# Patient Record
Sex: FEMALE | Race: ASIAN | NOT HISPANIC OR LATINO | Employment: OTHER | URBAN - METROPOLITAN AREA
[De-identification: names, ages, dates, MRNs, and addresses within clinical notes are randomized per-mention and may not be internally consistent; named-entity substitution may affect disease eponyms.]

---

## 2017-07-11 ENCOUNTER — COMMUNICATION - HEALTHEAST (OUTPATIENT)
Dept: FAMILY MEDICINE | Facility: CLINIC | Age: 32
End: 2017-07-11

## 2017-07-28 ENCOUNTER — PRENATAL OFFICE VISIT - HEALTHEAST (OUTPATIENT)
Dept: FAMILY MEDICINE | Facility: CLINIC | Age: 32
End: 2017-07-28

## 2017-07-28 ENCOUNTER — HOSPITAL ENCOUNTER (OUTPATIENT)
Dept: LAB | Age: 32
Setting detail: SPECIMEN
Discharge: HOME | End: 2017-07-28

## 2017-07-28 DIAGNOSIS — N92.6 ABNORMAL MENSES: ICD-10-CM

## 2017-07-28 DIAGNOSIS — Z34.90 NORMAL PREGNANCY: ICD-10-CM

## 2017-07-28 DIAGNOSIS — Z32.01 POSITIVE PREGNANCY TEST: ICD-10-CM

## 2017-07-28 LAB
HBV SURFACE AG SERPL QL IA: NEGATIVE
HIV 1+2 AB+HIV1 P24 AG SERPL QL IA: NEGATIVE

## 2017-07-28 ASSESSMENT — MIFFLIN-ST. JEOR: SCORE: 1093.71

## 2017-07-29 LAB — SYPHILIS RPR SCREEN - HISTORICAL: NORMAL

## 2017-08-03 ENCOUNTER — COMMUNICATION - HEALTHEAST (OUTPATIENT)
Dept: FAMILY MEDICINE | Facility: CLINIC | Age: 32
End: 2017-08-03

## 2017-08-03 ENCOUNTER — HOSPITAL ENCOUNTER (OUTPATIENT)
Dept: ULTRASOUND IMAGING | Facility: HOSPITAL | Age: 32
Discharge: HOME | End: 2017-08-03
Attending: PHYSICIAN ASSISTANT

## 2017-08-03 DIAGNOSIS — Z34.90 NORMAL PREGNANCY: ICD-10-CM

## 2017-08-03 DIAGNOSIS — Z32.01 POSITIVE PREGNANCY TEST: ICD-10-CM

## 2017-09-11 ENCOUNTER — HOSPITAL ENCOUNTER (OUTPATIENT)
Dept: LAB | Age: 32
Setting detail: SPECIMEN
Discharge: HOME | End: 2017-09-11

## 2017-09-11 ENCOUNTER — PRENATAL OFFICE VISIT - HEALTHEAST (OUTPATIENT)
Dept: FAMILY MEDICINE | Facility: CLINIC | Age: 32
End: 2017-09-11

## 2017-09-11 DIAGNOSIS — Z34.93 ENCOUNTER FOR SUPERVISION OF NORMAL PREGNANCY IN THIRD TRIMESTER: ICD-10-CM

## 2017-09-12 ENCOUNTER — COMMUNICATION - HEALTHEAST (OUTPATIENT)
Dept: FAMILY MEDICINE | Facility: CLINIC | Age: 32
End: 2017-09-12

## 2017-09-12 LAB
HBV SURFACE AB SERPL IA-ACNC: POSITIVE M[IU]/ML
SYPHILIS RPR SCREEN - HISTORICAL: NORMAL

## 2017-09-15 ENCOUNTER — AMBULATORY - HEALTHEAST (OUTPATIENT)
Dept: NURSING | Facility: CLINIC | Age: 32
End: 2017-09-15

## 2017-10-10 ENCOUNTER — PRENATAL OFFICE VISIT - HEALTHEAST (OUTPATIENT)
Dept: FAMILY MEDICINE | Facility: CLINIC | Age: 32
End: 2017-10-10

## 2017-10-10 DIAGNOSIS — Z23 NEED FOR IMMUNIZATION AGAINST INFLUENZA: ICD-10-CM

## 2017-10-10 DIAGNOSIS — Z34.93 ENCOUNTER FOR SUPERVISION OF NORMAL PREGNANCY IN THIRD TRIMESTER: ICD-10-CM

## 2017-10-19 ENCOUNTER — PRENATAL OFFICE VISIT - HEALTHEAST (OUTPATIENT)
Dept: FAMILY MEDICINE | Facility: CLINIC | Age: 32
End: 2017-10-19

## 2017-10-19 DIAGNOSIS — Z34.93 ENCOUNTER FOR SUPERVISION OF NORMAL PREGNANCY IN THIRD TRIMESTER: ICD-10-CM

## 2017-11-07 ENCOUNTER — AMBULATORY - HEALTHEAST (OUTPATIENT)
Dept: NURSING | Facility: CLINIC | Age: 32
End: 2017-11-07

## 2017-11-07 ENCOUNTER — PRENATAL OFFICE VISIT - HEALTHEAST (OUTPATIENT)
Dept: FAMILY MEDICINE | Facility: CLINIC | Age: 32
End: 2017-11-07

## 2017-11-07 DIAGNOSIS — Z34.93 ENCOUNTER FOR SUPERVISION OF NORMAL PREGNANCY IN THIRD TRIMESTER: ICD-10-CM

## 2017-11-14 ENCOUNTER — AMBULATORY - HEALTHEAST (OUTPATIENT)
Dept: NURSING | Facility: CLINIC | Age: 32
End: 2017-11-14

## 2017-11-21 ENCOUNTER — PRENATAL OFFICE VISIT - HEALTHEAST (OUTPATIENT)
Dept: FAMILY MEDICINE | Facility: CLINIC | Age: 32
End: 2017-11-21

## 2017-11-21 DIAGNOSIS — Z34.93 ENCOUNTER FOR SUPERVISION OF NORMAL PREGNANCY IN THIRD TRIMESTER: ICD-10-CM

## 2017-11-28 ENCOUNTER — PRENATAL OFFICE VISIT - HEALTHEAST (OUTPATIENT)
Dept: FAMILY MEDICINE | Facility: CLINIC | Age: 32
End: 2017-11-28

## 2017-11-28 DIAGNOSIS — Z34.93 ENCOUNTER FOR SUPERVISION OF NORMAL PREGNANCY IN THIRD TRIMESTER: ICD-10-CM

## 2017-12-05 ENCOUNTER — PRENATAL OFFICE VISIT - HEALTHEAST (OUTPATIENT)
Dept: FAMILY MEDICINE | Facility: CLINIC | Age: 32
End: 2017-12-05

## 2017-12-05 DIAGNOSIS — Z34.93 ENCOUNTER FOR SUPERVISION OF NORMAL PREGNANCY IN THIRD TRIMESTER: ICD-10-CM

## 2017-12-11 ENCOUNTER — COMMUNICATION - HEALTHEAST (OUTPATIENT)
Dept: FAMILY MEDICINE | Facility: CLINIC | Age: 32
End: 2017-12-11

## 2018-10-12 ENCOUNTER — PRENATAL OFFICE VISIT - HEALTHEAST (OUTPATIENT)
Dept: FAMILY MEDICINE | Facility: CLINIC | Age: 33
End: 2018-10-12

## 2018-10-12 ENCOUNTER — HOSPITAL ENCOUNTER (OUTPATIENT)
Dept: LAB | Age: 33
Setting detail: SPECIMEN
Discharge: HOME | End: 2018-10-12

## 2018-10-12 DIAGNOSIS — Z34.90 NORMAL PREGNANCY: ICD-10-CM

## 2018-10-12 DIAGNOSIS — N92.6 MISSED PERIOD: ICD-10-CM

## 2018-10-12 DIAGNOSIS — Z32.01 PREGNANCY TEST POSITIVE: ICD-10-CM

## 2018-10-12 LAB
ALBUMIN UR-MCNC: NEGATIVE MG/DL
AMPHETAMINES UR QL SCN: NORMAL
BARBITURATES UR QL: NORMAL
BASOPHILS # BLD AUTO: 0.1 THOU/UL (ref 0–0.2)
BASOPHILS NFR BLD AUTO: 1 % (ref 0–2)
BENZODIAZ UR QL: NORMAL
CANNABINOIDS UR QL SCN: NORMAL
COCAINE UR QL: NORMAL
CREAT UR-MCNC: 23.1 MG/DL
EOSINOPHIL # BLD AUTO: 0.3 THOU/UL (ref 0–0.4)
EOSINOPHIL NFR BLD AUTO: 4 % (ref 0–6)
ERYTHROCYTE [DISTWIDTH] IN BLOOD BY AUTOMATED COUNT: 13.4 % (ref 11–14.5)
GLUCOSE UR STRIP-MCNC: NEGATIVE MG/DL
HCG UR QL: POSITIVE
HCT VFR BLD AUTO: 42.8 % (ref 35–47)
HGB BLD-MCNC: 13.4 G/DL (ref 12–16)
HIV 1+2 AB+HIV1 P24 AG SERPL QL IA: NEGATIVE
KETONES UR STRIP-MCNC: NEGATIVE MG/DL
LYMPHOCYTES # BLD AUTO: 1.8 THOU/UL (ref 0.8–4.4)
LYMPHOCYTES NFR BLD AUTO: 25 % (ref 20–40)
MCH RBC QN AUTO: 26.2 PG (ref 27–34)
MCHC RBC AUTO-ENTMCNC: 31.3 G/DL (ref 32–36)
MCV RBC AUTO: 84 FL (ref 80–100)
MONOCYTES # BLD AUTO: 0.5 THOU/UL (ref 0–0.9)
MONOCYTES NFR BLD AUTO: 7 % (ref 2–10)
NEUTROPHILS # BLD AUTO: 4.5 THOU/UL (ref 2–7.7)
NEUTROPHILS NFR BLD AUTO: 62 % (ref 50–70)
OPIATES UR QL SCN: NORMAL
OXYCODONE UR QL: NORMAL
PCP UR QL SCN: NORMAL
PLATELET # BLD AUTO: 262 THOU/UL (ref 140–440)
PMV BLD AUTO: 10.2 FL (ref 8.5–12.5)
RBC # BLD AUTO: 5.11 MILL/UL (ref 3.8–5.4)
WBC: 7.3 THOU/UL (ref 4–11)

## 2018-10-13 LAB
ANTIBODY SCREEN: NEGATIVE
BACTERIA SPEC CULT: NO GROWTH
COLLECTION METHOD: NORMAL
HBV SURFACE AG SERPL QL IA: NEGATIVE
LEAD BLD-MCNC: NORMAL UG/DL
LEAD RETEST: NO
T PALLIDUM AB SER QL: NEGATIVE

## 2018-10-15 LAB
ABO/RH(D): NORMAL
ABORH REPEAT: NORMAL
LEAD BLDV-MCNC: <2 UG/DL (ref 0–4.9)
RUBV IGG SERPL QL IA: POSITIVE

## 2018-10-19 ENCOUNTER — COMMUNICATION - HEALTHEAST (OUTPATIENT)
Dept: NURSING | Facility: CLINIC | Age: 33
End: 2018-10-19

## 2018-10-19 ENCOUNTER — HOSPITAL ENCOUNTER (OUTPATIENT)
Dept: ULTRASOUND IMAGING | Facility: HOSPITAL | Age: 33
Discharge: HOME | End: 2018-10-19
Attending: PHYSICIAN ASSISTANT

## 2018-10-19 DIAGNOSIS — Z34.90 NORMAL PREGNANCY: ICD-10-CM

## 2018-10-19 DIAGNOSIS — Z32.01 PREGNANCY TEST POSITIVE: ICD-10-CM

## 2018-11-02 ENCOUNTER — COMMUNICATION - HEALTHEAST (OUTPATIENT)
Dept: NURSING | Facility: CLINIC | Age: 33
End: 2018-11-02

## 2018-11-09 ENCOUNTER — AMBULATORY - HEALTHEAST (OUTPATIENT)
Dept: NURSING | Facility: CLINIC | Age: 33
End: 2018-11-09

## 2018-11-16 ENCOUNTER — COMMUNICATION - HEALTHEAST (OUTPATIENT)
Dept: NURSING | Facility: CLINIC | Age: 33
End: 2018-11-16

## 2018-11-30 ENCOUNTER — COMMUNICATION - HEALTHEAST (OUTPATIENT)
Dept: NURSING | Facility: CLINIC | Age: 33
End: 2018-11-30

## 2018-12-03 ENCOUNTER — COMMUNICATION - HEALTHEAST (OUTPATIENT)
Dept: NURSING | Facility: CLINIC | Age: 33
End: 2018-12-03

## 2018-12-07 ENCOUNTER — PRENATAL OFFICE VISIT - HEALTHEAST (OUTPATIENT)
Dept: FAMILY MEDICINE | Facility: CLINIC | Age: 33
End: 2018-12-07

## 2018-12-07 DIAGNOSIS — Z34.82 ENCOUNTER FOR SUPERVISION OF OTHER NORMAL PREGNANCY IN SECOND TRIMESTER: ICD-10-CM

## 2018-12-07 LAB
CLUE CELLS: NORMAL
HCG UR QL: POSITIVE
TRICHOMONAS, WET PREP: NORMAL
YEAST, WET PREP: NORMAL

## 2018-12-07 ASSESSMENT — MIFFLIN-ST. JEOR: SCORE: 1150.41

## 2018-12-10 LAB
HPV SOURCE: ABNORMAL
HUMAN PAPILLOMA VIRUS 16 DNA: NEGATIVE
HUMAN PAPILLOMA VIRUS 18 DNA: NEGATIVE
HUMAN PAPILLOMA VIRUS FINAL DIAGNOSIS: ABNORMAL
HUMAN PAPILLOMA VIRUS OTHER HR: POSITIVE
SPECIMEN DESCRIPTION: ABNORMAL

## 2018-12-11 LAB
AFP MOM: 0.97 MOM
ALPHA FETO PROTEIN: 34.9 NG/ML
C TRACH DNA SPEC QL PROBE+SIG AMP: NEGATIVE
CALCULATED AGE AT EDD: NORMAL
COLLECTION DATE: NORMAL
CURRENT CIGARETTE SMOKING STATUS: NORMAL
DOWN SYNDROME MATERNAL AGE RISK: NORMAL
DOWN SYNDROME SCREEN RISK ESTIMATE: NORMAL
EDD BY U/S SCAN: NORMAL
GA ON COLLECTION BY U/S SCAN: NORMAL WK,D
GA USED IN RISK ESTIMATE: NORMAL
GENERAL TEST INFORMATION: NORMAL
HCG, TOTAL MOM: 0.95 MOM
HCG, TOTAL: 36.3 IU/ML
INHIBIN MOM: 0.78 MOM
INHIBIN: 143 PG/ML
INITIAL OR REPEAT TESTING: NORMAL
INSULIN DIABETIC: NO
INTERPRETATION: NORMAL
IVF PREGNANCY: NO
Lab: NORMAL
N GONORRHOEA DNA SPEC QL NAA+PROBE: NEGATIVE
NEURAL TUBE DEFECT RISK ESTIMATE: NORMAL
NUMBER OF CHORIONS: NORMAL
NUMBER OF FETUSES:: 1
PATIENT OR FATHER OF BABY HAS A NTD: NO
PATIENT WEIGHT: 122 LBS
PHYSICIAN PHONE NUMBER: NORMAL
PREV DOWN (T21) / TRISOMY PREGNANCY: NORMAL
PREV PREGNANCY W/ NEURAL TUBE DEFECT: NO
PT DATE OF BIRTH: NORMAL
RACE OF MOTHER: NORMAL
RECOMMENDED FOLLOW UP: NORMAL
TRISOMY 18 SCREEN RISK ESTIMATE: NORMAL
UE3 MOM: 1.75 MOM
UE3: 1.39 NG/ML

## 2018-12-14 ENCOUNTER — COMMUNICATION - HEALTHEAST (OUTPATIENT)
Dept: NURSING | Facility: CLINIC | Age: 33
End: 2018-12-14

## 2018-12-14 ENCOUNTER — AMBULATORY - HEALTHEAST (OUTPATIENT)
Dept: NURSING | Facility: CLINIC | Age: 33
End: 2018-12-14

## 2018-12-14 DIAGNOSIS — Z71.89 COUNSELING AND COORDINATION OF CARE: ICD-10-CM

## 2018-12-26 ENCOUNTER — COMMUNICATION - HEALTHEAST (OUTPATIENT)
Dept: FAMILY MEDICINE | Facility: CLINIC | Age: 33
End: 2018-12-26

## 2019-01-02 ENCOUNTER — COMMUNICATION - HEALTHEAST (OUTPATIENT)
Dept: CARE COORDINATION | Facility: CLINIC | Age: 34
End: 2019-01-02

## 2019-01-02 ENCOUNTER — COMMUNICATION - HEALTHEAST (OUTPATIENT)
Dept: NURSING | Facility: CLINIC | Age: 34
End: 2019-01-02

## 2019-01-08 ENCOUNTER — COMMUNICATION - HEALTHEAST (OUTPATIENT)
Dept: NURSING | Facility: CLINIC | Age: 34
End: 2019-01-08

## 2019-01-11 ENCOUNTER — COMMUNICATION - HEALTHEAST (OUTPATIENT)
Dept: TELEHEALTH | Facility: CLINIC | Age: 34
End: 2019-01-11

## 2019-01-11 ENCOUNTER — PRENATAL OFFICE VISIT - HEALTHEAST (OUTPATIENT)
Dept: FAMILY MEDICINE | Facility: CLINIC | Age: 34
End: 2019-01-11

## 2019-01-11 DIAGNOSIS — Z34.82 ENCOUNTER FOR SUPERVISION OF OTHER NORMAL PREGNANCY IN SECOND TRIMESTER: ICD-10-CM

## 2019-01-11 DIAGNOSIS — L70.0 ACNE VULGARIS: ICD-10-CM

## 2019-01-11 LAB
ALBUMIN UR-MCNC: NEGATIVE MG/DL
GLUCOSE UR STRIP-MCNC: NEGATIVE MG/DL
KETONES UR STRIP-MCNC: NEGATIVE MG/DL

## 2019-01-16 ENCOUNTER — COMMUNICATION - HEALTHEAST (OUTPATIENT)
Dept: NURSING | Facility: CLINIC | Age: 34
End: 2019-01-16

## 2019-01-16 ENCOUNTER — COMMUNICATION - HEALTHEAST (OUTPATIENT)
Dept: FAMILY MEDICINE | Facility: CLINIC | Age: 34
End: 2019-01-16

## 2019-01-18 ENCOUNTER — HOSPITAL ENCOUNTER (OUTPATIENT)
Dept: ULTRASOUND IMAGING | Facility: HOSPITAL | Age: 34
Discharge: HOME | End: 2019-01-18
Attending: FAMILY MEDICINE

## 2019-01-18 DIAGNOSIS — Z34.82 ENCOUNTER FOR SUPERVISION OF OTHER NORMAL PREGNANCY IN SECOND TRIMESTER: ICD-10-CM

## 2019-02-05 ENCOUNTER — COMMUNICATION - HEALTHEAST (OUTPATIENT)
Dept: NURSING | Facility: CLINIC | Age: 34
End: 2019-02-05

## 2019-02-22 ENCOUNTER — PRENATAL OFFICE VISIT - HEALTHEAST (OUTPATIENT)
Dept: FAMILY MEDICINE | Facility: CLINIC | Age: 34
End: 2019-02-22

## 2019-02-22 DIAGNOSIS — Z34.02 SUPERVISION OF NORMAL FIRST PREGNANCY IN SECOND TRIMESTER: ICD-10-CM

## 2019-02-22 LAB
ALBUMIN UR-MCNC: NEGATIVE MG/DL
GLUCOSE UR STRIP-MCNC: ABNORMAL MG/DL
KETONES UR STRIP-MCNC: NEGATIVE MG/DL

## 2019-02-26 ENCOUNTER — COMMUNICATION - HEALTHEAST (OUTPATIENT)
Dept: NURSING | Facility: CLINIC | Age: 34
End: 2019-02-26

## 2019-03-08 ENCOUNTER — COMMUNICATION - HEALTHEAST (OUTPATIENT)
Dept: NURSING | Facility: CLINIC | Age: 34
End: 2019-03-08

## 2019-03-12 ENCOUNTER — PRENATAL OFFICE VISIT - HEALTHEAST (OUTPATIENT)
Dept: FAMILY MEDICINE | Facility: CLINIC | Age: 34
End: 2019-03-12

## 2019-03-12 ENCOUNTER — COMMUNICATION - HEALTHEAST (OUTPATIENT)
Dept: FAMILY MEDICINE | Facility: CLINIC | Age: 34
End: 2019-03-12

## 2019-03-12 DIAGNOSIS — R73.9 HYPERGLYCEMIA: ICD-10-CM

## 2019-03-12 DIAGNOSIS — Z34.82 ENCOUNTER FOR SUPERVISION OF OTHER NORMAL PREGNANCY IN SECOND TRIMESTER: ICD-10-CM

## 2019-03-12 DIAGNOSIS — Z34.93 SUPERVISION OF NORMAL PREGNANCY IN THIRD TRIMESTER: ICD-10-CM

## 2019-03-12 LAB
FASTING STATUS PATIENT QL REPORTED: NO
GLUCOSE 1H P 50 G GLC PO SERPL-MCNC: 145 MG/DL (ref 70–139)
HGB BLD-MCNC: 12.6 G/DL (ref 12–16)

## 2019-03-13 LAB — T PALLIDUM AB SER QL: NEGATIVE

## 2019-03-19 ENCOUNTER — AMBULATORY - HEALTHEAST (OUTPATIENT)
Dept: LAB | Facility: CLINIC | Age: 34
End: 2019-03-19

## 2019-03-19 ENCOUNTER — COMMUNICATION - HEALTHEAST (OUTPATIENT)
Dept: TELEHEALTH | Facility: CLINIC | Age: 34
End: 2019-03-19

## 2019-03-19 ENCOUNTER — COMMUNICATION - HEALTHEAST (OUTPATIENT)
Dept: FAMILY MEDICINE | Facility: CLINIC | Age: 34
End: 2019-03-19

## 2019-03-19 DIAGNOSIS — R73.9 HYPERGLYCEMIA: ICD-10-CM

## 2019-03-19 LAB
FASTING STATUS PATIENT QL REPORTED: YES
GLUCOSE 1H P 100 G GLC PO SERPL-MCNC: 195 MG/DL
GLUCOSE 2H P 100 G GLC PO SERPL-MCNC: 154 MG/DL
GLUCOSE 3H P 100 G GLC PO SERPL-MCNC: 138 MG/DL
GLUCOSE P FAST SERPL-MCNC: 81 MG/DL

## 2019-04-11 ENCOUNTER — PRENATAL OFFICE VISIT - HEALTHEAST (OUTPATIENT)
Dept: FAMILY MEDICINE | Facility: CLINIC | Age: 34
End: 2019-04-11

## 2019-04-11 DIAGNOSIS — Z34.93 SUPERVISION OF NORMAL PREGNANCY IN THIRD TRIMESTER: ICD-10-CM

## 2019-04-11 DIAGNOSIS — M54.9 BACK PAIN AFFECTING PREGNANCY IN THIRD TRIMESTER: ICD-10-CM

## 2019-04-11 DIAGNOSIS — O99.891 BACK PAIN AFFECTING PREGNANCY IN THIRD TRIMESTER: ICD-10-CM

## 2019-04-11 LAB
ALBUMIN UR-MCNC: NEGATIVE MG/DL
GLUCOSE UR STRIP-MCNC: NEGATIVE MG/DL
KETONES UR STRIP-MCNC: ABNORMAL MG/DL

## 2019-04-16 ENCOUNTER — HOSPITAL ENCOUNTER (OUTPATIENT)
Dept: ULTRASOUND IMAGING | Facility: HOSPITAL | Age: 34
Discharge: HOME | End: 2019-04-16
Attending: FAMILY MEDICINE

## 2019-05-02 ENCOUNTER — AMBULATORY - HEALTHEAST (OUTPATIENT)
Dept: CARE COORDINATION | Facility: CLINIC | Age: 34
End: 2019-05-02

## 2019-05-03 ENCOUNTER — PRENATAL OFFICE VISIT - HEALTHEAST (OUTPATIENT)
Dept: FAMILY MEDICINE | Facility: CLINIC | Age: 34
End: 2019-05-03

## 2019-05-03 DIAGNOSIS — Z34.93 SUPERVISION OF NORMAL PREGNANCY IN THIRD TRIMESTER: ICD-10-CM

## 2019-05-04 LAB
ALLERGIC TO PENICILLIN: NO
GP B STREP DNA SPEC QL NAA+PROBE: NEGATIVE

## 2019-05-09 ENCOUNTER — COMMUNICATION - HEALTHEAST (OUTPATIENT)
Dept: NURSING | Facility: CLINIC | Age: 34
End: 2019-05-09

## 2019-05-13 ENCOUNTER — PRENATAL OFFICE VISIT - HEALTHEAST (OUTPATIENT)
Dept: FAMILY MEDICINE | Facility: CLINIC | Age: 34
End: 2019-05-13

## 2019-05-13 DIAGNOSIS — Z34.93 SUPERVISION OF NORMAL PREGNANCY IN THIRD TRIMESTER: ICD-10-CM

## 2019-05-14 ENCOUNTER — AMBULATORY - HEALTHEAST (OUTPATIENT)
Dept: CARE COORDINATION | Facility: CLINIC | Age: 34
End: 2019-05-14

## 2019-05-23 ENCOUNTER — PRENATAL OFFICE VISIT - HEALTHEAST (OUTPATIENT)
Dept: FAMILY MEDICINE | Facility: CLINIC | Age: 34
End: 2019-05-23

## 2019-05-23 ENCOUNTER — COMMUNICATION - HEALTHEAST (OUTPATIENT)
Dept: NURSING | Facility: CLINIC | Age: 34
End: 2019-05-23

## 2019-05-23 DIAGNOSIS — Z34.93 SUPERVISION OF NORMAL PREGNANCY IN THIRD TRIMESTER: ICD-10-CM

## 2019-05-23 DIAGNOSIS — Z34.83 ENCOUNTER FOR SUPERVISION OF OTHER NORMAL PREGNANCY IN THIRD TRIMESTER: ICD-10-CM

## 2019-05-27 ENCOUNTER — HOME CARE/HOSPICE - HEALTHEAST (OUTPATIENT)
Dept: HOME HEALTH SERVICES | Facility: HOME HEALTH | Age: 34
End: 2019-05-27

## 2019-05-30 ENCOUNTER — COMMUNICATION - HEALTHEAST (OUTPATIENT)
Dept: NURSING | Facility: CLINIC | Age: 34
End: 2019-05-30

## 2019-05-31 ENCOUNTER — HOME CARE/HOSPICE - HEALTHEAST (OUTPATIENT)
Dept: HOME HEALTH SERVICES | Facility: HOME HEALTH | Age: 34
End: 2019-05-31

## 2019-06-12 ENCOUNTER — COMMUNICATION - HEALTHEAST (OUTPATIENT)
Dept: NURSING | Facility: CLINIC | Age: 34
End: 2019-06-12

## 2019-06-13 ENCOUNTER — COMMUNICATION - HEALTHEAST (OUTPATIENT)
Dept: CARE COORDINATION | Facility: CLINIC | Age: 34
End: 2019-06-13

## 2019-07-10 ENCOUNTER — COMMUNICATION - HEALTHEAST (OUTPATIENT)
Dept: NURSING | Facility: CLINIC | Age: 34
End: 2019-07-10

## 2019-07-11 ENCOUNTER — COMMUNICATION - HEALTHEAST (OUTPATIENT)
Dept: SCHEDULING | Facility: CLINIC | Age: 34
End: 2019-07-11

## 2019-07-12 ENCOUNTER — OFFICE VISIT - HEALTHEAST (OUTPATIENT)
Dept: FAMILY MEDICINE | Facility: CLINIC | Age: 34
End: 2019-07-12

## 2019-07-12 DIAGNOSIS — M70.62 GREATER TROCHANTERIC BURSITIS OF BOTH HIPS: ICD-10-CM

## 2019-07-12 DIAGNOSIS — M54.50 ACUTE BILATERAL LOW BACK PAIN WITHOUT SCIATICA: ICD-10-CM

## 2019-07-12 DIAGNOSIS — M70.61 GREATER TROCHANTERIC BURSITIS OF BOTH HIPS: ICD-10-CM

## 2019-07-31 ENCOUNTER — COMMUNICATION - HEALTHEAST (OUTPATIENT)
Dept: NURSING | Facility: CLINIC | Age: 34
End: 2019-07-31

## 2019-08-06 ENCOUNTER — OFFICE VISIT - HEALTHEAST (OUTPATIENT)
Dept: FAMILY MEDICINE | Facility: CLINIC | Age: 34
End: 2019-08-06

## 2019-08-06 ENCOUNTER — HOSPITAL ENCOUNTER (OUTPATIENT)
Dept: LAB | Age: 34
Setting detail: SPECIMEN
Discharge: HOME | End: 2019-08-06

## 2019-08-06 DIAGNOSIS — N39.3 FEMALE STRESS INCONTINENCE: ICD-10-CM

## 2019-08-06 DIAGNOSIS — B97.7 HIGH RISK HPV INFECTION: ICD-10-CM

## 2019-08-06 LAB
CLUE CELLS: NORMAL
TRICHOMONAS, WET PREP: NORMAL
YEAST, WET PREP: NORMAL

## 2019-08-07 ENCOUNTER — COMMUNICATION - HEALTHEAST (OUTPATIENT)
Dept: NURSING | Facility: CLINIC | Age: 34
End: 2019-08-07

## 2019-08-07 LAB
C TRACH DNA SPEC QL PROBE+SIG AMP: NEGATIVE
HPV SOURCE: NORMAL
HUMAN PAPILLOMA VIRUS 16 DNA: NEGATIVE
HUMAN PAPILLOMA VIRUS 18 DNA: NEGATIVE
HUMAN PAPILLOMA VIRUS FINAL DIAGNOSIS: NORMAL
HUMAN PAPILLOMA VIRUS OTHER HR: NEGATIVE
N GONORRHOEA DNA SPEC QL NAA+PROBE: NEGATIVE
SPECIMEN DESCRIPTION: NORMAL

## 2019-08-13 LAB
BKR LAB AP ABNORMAL BLEEDING: NO
BKR LAB AP BIRTH CONTROL/HORMONES: NORMAL
BKR LAB AP CERVICAL APPEARANCE: NORMAL
BKR LAB AP GYN ADEQUACY: NORMAL
BKR LAB AP GYN INTERPRETATION: NORMAL
BKR LAB AP HPV REFLEX: NORMAL
BKR LAB AP LMP: NORMAL
BKR LAB AP PATIENT STATUS: NORMAL
BKR LAB AP PREVIOUS ABNORMAL: NORMAL
BKR LAB AP PREVIOUS NORMAL: 2018
HIGH RISK?: NO
PATH REPORT.COMMENTS IMP SPEC: NORMAL
RESULT FLAG (HE HISTORICAL CONVERSION): NORMAL

## 2019-08-14 ENCOUNTER — COMMUNICATION - HEALTHEAST (OUTPATIENT)
Dept: FAMILY MEDICINE | Facility: CLINIC | Age: 34
End: 2019-08-14

## 2019-08-16 ENCOUNTER — COMMUNICATION - HEALTHEAST (OUTPATIENT)
Dept: NURSING | Facility: CLINIC | Age: 34
End: 2019-08-16

## 2019-08-20 ENCOUNTER — COMMUNICATION - HEALTHEAST (OUTPATIENT)
Dept: CARE COORDINATION | Facility: CLINIC | Age: 34
End: 2019-08-20

## 2019-08-30 ENCOUNTER — COMMUNICATION - HEALTHEAST (OUTPATIENT)
Dept: NURSING | Facility: CLINIC | Age: 34
End: 2019-08-30

## 2019-09-03 ENCOUNTER — COMMUNICATION - HEALTHEAST (OUTPATIENT)
Dept: NURSING | Facility: CLINIC | Age: 34
End: 2019-09-03

## 2019-09-06 ENCOUNTER — COMMUNICATION - HEALTHEAST (OUTPATIENT)
Dept: NURSING | Facility: CLINIC | Age: 34
End: 2019-09-06

## 2019-10-07 ENCOUNTER — COMMUNICATION - HEALTHEAST (OUTPATIENT)
Dept: NURSING | Facility: CLINIC | Age: 34
End: 2019-10-07

## 2019-10-08 ENCOUNTER — COMMUNICATION - HEALTHEAST (OUTPATIENT)
Dept: FAMILY MEDICINE | Facility: CLINIC | Age: 34
End: 2019-10-08

## 2019-10-08 ENCOUNTER — OFFICE VISIT - HEALTHEAST (OUTPATIENT)
Dept: FAMILY MEDICINE | Facility: CLINIC | Age: 34
End: 2019-10-08

## 2019-10-08 DIAGNOSIS — O20.0 THREATENED MISCARRIAGE: ICD-10-CM

## 2019-10-08 DIAGNOSIS — N92.6 ABNORMAL MENSES: ICD-10-CM

## 2019-10-08 LAB
HCG SERPL-ACNC: 55 MLU/ML (ref 0–4)
HCG UR QL: POSITIVE

## 2019-10-09 ENCOUNTER — HOSPITAL ENCOUNTER (OUTPATIENT)
Dept: ULTRASOUND IMAGING | Facility: HOSPITAL | Age: 34
Discharge: HOME | End: 2019-10-09
Attending: PHYSICIAN ASSISTANT

## 2019-10-09 DIAGNOSIS — N92.6 ABNORMAL MENSES: ICD-10-CM

## 2019-10-09 DIAGNOSIS — O20.0 THREATENED MISCARRIAGE: ICD-10-CM

## 2019-10-17 ENCOUNTER — OFFICE VISIT - HEALTHEAST (OUTPATIENT)
Dept: FAMILY MEDICINE | Facility: CLINIC | Age: 34
End: 2019-10-17

## 2019-10-17 DIAGNOSIS — Z30.016 ENCOUNTER FOR INITIAL PRESCRIPTION OF TRANSDERMAL PATCH HORMONAL CONTRACEPTIVE DEVICE: ICD-10-CM

## 2019-10-17 DIAGNOSIS — Z23 NEED FOR INFLUENZA VACCINATION: ICD-10-CM

## 2019-10-17 DIAGNOSIS — O03.9 SAB (SPONTANEOUS ABORTION): ICD-10-CM

## 2019-10-17 LAB — HCG UR QL: NEGATIVE

## 2019-10-17 ASSESSMENT — MIFFLIN-ST. JEOR: SCORE: 1166.85

## 2019-11-05 ENCOUNTER — COMMUNICATION - HEALTHEAST (OUTPATIENT)
Dept: NURSING | Facility: CLINIC | Age: 34
End: 2019-11-05

## 2019-11-14 ENCOUNTER — COMMUNICATION - HEALTHEAST (OUTPATIENT)
Dept: NURSING | Facility: CLINIC | Age: 34
End: 2019-11-14

## 2019-11-15 ENCOUNTER — COMMUNICATION - HEALTHEAST (OUTPATIENT)
Dept: FAMILY MEDICINE | Facility: CLINIC | Age: 34
End: 2019-11-15

## 2019-11-15 ENCOUNTER — AMBULATORY - HEALTHEAST (OUTPATIENT)
Dept: FAMILY MEDICINE | Facility: CLINIC | Age: 34
End: 2019-11-15

## 2019-11-15 ENCOUNTER — COMMUNICATION - HEALTHEAST (OUTPATIENT)
Dept: NURSING | Facility: CLINIC | Age: 34
End: 2019-11-15

## 2019-12-10 ENCOUNTER — COMMUNICATION - HEALTHEAST (OUTPATIENT)
Dept: NURSING | Facility: CLINIC | Age: 34
End: 2019-12-10

## 2019-12-16 ENCOUNTER — COMMUNICATION - HEALTHEAST (OUTPATIENT)
Dept: NURSING | Facility: CLINIC | Age: 34
End: 2019-12-16

## 2019-12-17 ENCOUNTER — COMMUNICATION - HEALTHEAST (OUTPATIENT)
Dept: CARE COORDINATION | Facility: CLINIC | Age: 34
End: 2019-12-17

## 2019-12-18 ENCOUNTER — COMMUNICATION - HEALTHEAST (OUTPATIENT)
Dept: NURSING | Facility: CLINIC | Age: 34
End: 2019-12-18

## 2020-01-02 ENCOUNTER — AMBULATORY - HEALTHEAST (OUTPATIENT)
Dept: FAMILY MEDICINE | Facility: CLINIC | Age: 35
End: 2020-01-02

## 2020-01-02 DIAGNOSIS — Z30.016 ENCOUNTER FOR INITIAL PRESCRIPTION OF TRANSDERMAL PATCH HORMONAL CONTRACEPTIVE DEVICE: ICD-10-CM

## 2020-01-13 ENCOUNTER — COMMUNICATION - HEALTHEAST (OUTPATIENT)
Dept: NURSING | Facility: CLINIC | Age: 35
End: 2020-01-13

## 2020-09-18 ENCOUNTER — COMMUNICATION - HEALTHEAST (OUTPATIENT)
Dept: FAMILY MEDICINE | Facility: CLINIC | Age: 35
End: 2020-09-18

## 2020-09-30 ENCOUNTER — PRENATAL OFFICE VISIT - HEALTHEAST (OUTPATIENT)
Dept: FAMILY MEDICINE | Facility: CLINIC | Age: 35
End: 2020-09-30

## 2020-09-30 DIAGNOSIS — Z32.01 PREGNANCY TEST POSITIVE: ICD-10-CM

## 2020-09-30 DIAGNOSIS — N92.6 ABNORMAL MENSES: ICD-10-CM

## 2020-09-30 DIAGNOSIS — Z3A.01 LESS THAN 8 WEEKS GESTATION OF PREGNANCY: ICD-10-CM

## 2020-09-30 DIAGNOSIS — N89.8 VAGINAL DISCHARGE: ICD-10-CM

## 2020-09-30 DIAGNOSIS — R42 DIZZINESS ON STANDING: ICD-10-CM

## 2020-09-30 LAB
ALBUMIN UR-MCNC: NEGATIVE MG/DL
AMPHETAMINES UR QL SCN: NORMAL
BARBITURATES UR QL: NORMAL
BASOPHILS # BLD AUTO: 0.1 THOU/UL (ref 0–0.2)
BASOPHILS NFR BLD AUTO: 2 % (ref 0–2)
BENZODIAZ UR QL: NORMAL
CANNABINOIDS UR QL SCN: NORMAL
CLUE CELLS: NORMAL
COCAINE UR QL: NORMAL
CREAT UR-MCNC: 22.9 MG/DL
EOSINOPHIL # BLD AUTO: 0.5 THOU/UL (ref 0–0.4)
EOSINOPHIL NFR BLD AUTO: 6 % (ref 0–6)
ERYTHROCYTE [DISTWIDTH] IN BLOOD BY AUTOMATED COUNT: 12.9 % (ref 11–14.5)
GLUCOSE UR STRIP-MCNC: NEGATIVE MG/DL
HCG UR QL: POSITIVE
HCT VFR BLD AUTO: 43.8 % (ref 35–47)
HGB BLD-MCNC: 13.9 G/DL (ref 12–16)
HIV 1+2 AB+HIV1 P24 AG SERPL QL IA: NEGATIVE
IMM GRANULOCYTES # BLD: 0 THOU/UL
IMM GRANULOCYTES NFR BLD: 0 %
KETONES UR STRIP-MCNC: NEGATIVE MG/DL
LYMPHOCYTES # BLD AUTO: 2.3 THOU/UL (ref 0.8–4.4)
LYMPHOCYTES NFR BLD AUTO: 27 % (ref 20–40)
MCH RBC QN AUTO: 26.5 PG (ref 27–34)
MCHC RBC AUTO-ENTMCNC: 31.7 G/DL (ref 32–36)
MCV RBC AUTO: 84 FL (ref 80–100)
MONOCYTES # BLD AUTO: 0.6 THOU/UL (ref 0–0.9)
MONOCYTES NFR BLD AUTO: 7 % (ref 2–10)
NEUTROPHILS # BLD AUTO: 5 THOU/UL (ref 2–7.7)
NEUTROPHILS NFR BLD AUTO: 58 % (ref 50–70)
OPIATES UR QL SCN: NORMAL
OXYCODONE UR QL: NORMAL
PCP UR QL SCN: NORMAL
PLATELET # BLD AUTO: 340 THOU/UL (ref 140–440)
PMV BLD AUTO: 9.9 FL (ref 8.5–12.5)
RBC # BLD AUTO: 5.24 MILL/UL (ref 3.8–5.4)
TRICHOMONAS, WET PREP: NORMAL
WBC: 8.5 THOU/UL (ref 4–11)
YEAST, WET PREP: NORMAL

## 2020-10-01 LAB
ABO/RH(D): NORMAL
ABORH REPEAT: NORMAL
ANTIBODY SCREEN: NEGATIVE
BACTERIA SPEC CULT: NO GROWTH
C TRACH DNA SPEC QL PROBE+SIG AMP: NEGATIVE
HBV SURFACE AG SERPL QL IA: NEGATIVE
N GONORRHOEA DNA SPEC QL NAA+PROBE: NEGATIVE
RUBV IGG SERPL QL IA: POSITIVE
T PALLIDUM AB SER QL: NEGATIVE

## 2020-10-03 LAB
COLLECTION METHOD: NORMAL
LEAD BLD-MCNC: NORMAL UG/DL
LEAD BLDV-MCNC: <2 UG/DL

## 2020-10-21 ENCOUNTER — COMMUNICATION - HEALTHEAST (OUTPATIENT)
Dept: FAMILY MEDICINE | Facility: CLINIC | Age: 35
End: 2020-10-21

## 2020-10-21 ENCOUNTER — HOSPITAL ENCOUNTER (OUTPATIENT)
Dept: ULTRASOUND IMAGING | Facility: HOSPITAL | Age: 35
Discharge: HOME | End: 2020-10-21
Attending: PHYSICIAN ASSISTANT

## 2020-10-21 DIAGNOSIS — Z3A.01 LESS THAN 8 WEEKS GESTATION OF PREGNANCY: ICD-10-CM

## 2020-10-21 DIAGNOSIS — Z32.01 PREGNANCY TEST POSITIVE: ICD-10-CM

## 2020-10-21 DIAGNOSIS — O20.0 THREATENED MISCARRIAGE: ICD-10-CM

## 2020-10-26 ENCOUNTER — SURGERY - HEALTHEAST (OUTPATIENT)
Dept: SURGERY | Facility: HOSPITAL | Age: 35
End: 2020-10-26

## 2020-10-26 ENCOUNTER — ANESTHESIA - HEALTHEAST (OUTPATIENT)
Dept: SURGERY | Facility: HOSPITAL | Age: 35
End: 2020-10-26

## 2020-10-26 ASSESSMENT — MIFFLIN-ST. JEOR: SCORE: 1109.59

## 2020-10-28 ENCOUNTER — COMMUNICATION - HEALTHEAST (OUTPATIENT)
Dept: NURSING | Facility: CLINIC | Age: 35
End: 2020-10-28

## 2021-02-12 ENCOUNTER — OFFICE VISIT - HEALTHEAST (OUTPATIENT)
Dept: FAMILY MEDICINE | Facility: CLINIC | Age: 36
End: 2021-02-12

## 2021-02-12 DIAGNOSIS — N92.6 ABNORMAL MENSES: ICD-10-CM

## 2021-02-12 DIAGNOSIS — Z3A.08 8 WEEKS GESTATION OF PREGNANCY: ICD-10-CM

## 2021-02-12 DIAGNOSIS — E66.09 CLASS 1 OBESITY DUE TO EXCESS CALORIES WITHOUT SERIOUS COMORBIDITY WITH BODY MASS INDEX (BMI) OF 32.0 TO 32.9 IN ADULT: ICD-10-CM

## 2021-02-12 DIAGNOSIS — R71.8 MICROCYTOSIS: ICD-10-CM

## 2021-02-12 DIAGNOSIS — E66.811 CLASS 1 OBESITY DUE TO EXCESS CALORIES WITHOUT SERIOUS COMORBIDITY WITH BODY MASS INDEX (BMI) OF 32.0 TO 32.9 IN ADULT: ICD-10-CM

## 2021-02-12 LAB
ALBUMIN UR-MCNC: NEGATIVE MG/DL
APPEARANCE UR: CLEAR
BASOPHILS # BLD AUTO: 0.2 THOU/UL (ref 0–0.2)
BASOPHILS NFR BLD AUTO: 2 % (ref 0–2)
BILIRUB UR QL STRIP: NEGATIVE
COLOR UR AUTO: YELLOW
EOSINOPHIL # BLD AUTO: 0.7 THOU/UL (ref 0–0.4)
EOSINOPHIL NFR BLD AUTO: 9 % (ref 0–6)
ERYTHROCYTE [DISTWIDTH] IN BLOOD BY AUTOMATED COUNT: 18.8 % (ref 11–14.5)
GLUCOSE UR STRIP-MCNC: NEGATIVE MG/DL
HCG UR QL: POSITIVE
HCT VFR BLD AUTO: 40.7 % (ref 35–47)
HGB BLD-MCNC: 12.6 G/DL (ref 12–16)
HGB UR QL STRIP: ABNORMAL
HIV 1+2 AB+HIV1 P24 AG SERPL QL IA: NEGATIVE
IMM GRANULOCYTES # BLD: 0 THOU/UL
IMM GRANULOCYTES NFR BLD: 0 %
KETONES UR STRIP-MCNC: NEGATIVE MG/DL
LEUKOCYTE ESTERASE UR QL STRIP: ABNORMAL
LYMPHOCYTES # BLD AUTO: 2.1 THOU/UL (ref 0.8–4.4)
LYMPHOCYTES NFR BLD AUTO: 25 % (ref 20–40)
MCH RBC QN AUTO: 23.6 PG (ref 27–34)
MCHC RBC AUTO-ENTMCNC: 31 G/DL (ref 32–36)
MCV RBC AUTO: 76 FL (ref 80–100)
MONOCYTES # BLD AUTO: 0.5 THOU/UL (ref 0–0.9)
MONOCYTES NFR BLD AUTO: 6 % (ref 2–10)
NEUTROPHILS # BLD AUTO: 4.9 THOU/UL (ref 2–7.7)
NEUTROPHILS NFR BLD AUTO: 58 % (ref 50–70)
NITRATE UR QL: NEGATIVE
PH UR STRIP: 5.5 [PH] (ref 5–8)
PLATELET # BLD AUTO: 325 THOU/UL (ref 140–440)
PMV BLD AUTO: 10.1 FL (ref 8.5–12.5)
RBC # BLD AUTO: 5.35 MILL/UL (ref 3.8–5.4)
SP GR UR STRIP: 1.01 (ref 1–1.03)
UROBILINOGEN UR STRIP-ACNC: ABNORMAL
WBC: 8.4 THOU/UL (ref 4–11)

## 2021-02-13 LAB
ABO/RH(D): NORMAL
ABORH REPEAT: NORMAL
ANTIBODY SCREEN: NEGATIVE
BACTERIA SPEC CULT: NO GROWTH
HBV SURFACE AG SERPL QL IA: NEGATIVE
T PALLIDUM AB SER QL: NEGATIVE

## 2021-02-15 ENCOUNTER — COMMUNICATION - HEALTHEAST (OUTPATIENT)
Dept: NURSING | Facility: CLINIC | Age: 36
End: 2021-02-15

## 2021-02-15 LAB
COLLECTION METHOD: NORMAL
HCV AB SERPL QL IA: NEGATIVE
LEAD BLD-MCNC: NORMAL UG/DL
LEAD BLDV-MCNC: <2 UG/DL
RUBV IGG SERPL QL IA: POSITIVE

## 2021-02-16 LAB
C TRACH DNA SPEC QL PROBE+SIG AMP: NEGATIVE
N GONORRHOEA DNA SPEC QL NAA+PROBE: NEGATIVE

## 2021-02-18 LAB
HEMOGLOBIN A2 QUANTITATION: 2.5 % (ref 2.2–3.5)
HEMOGLOBIN ELECTROPHRESIS: NORMAL
HEMOGLOBIN F QUANTITATION: <0.8 % (ref 0–2)
PATH ICD:: NORMAL
REVIEWING PATHOLOGIST: NORMAL

## 2021-02-19 ENCOUNTER — COMMUNICATION - HEALTHEAST (OUTPATIENT)
Dept: CARE COORDINATION | Facility: CLINIC | Age: 36
End: 2021-02-19

## 2021-02-22 ENCOUNTER — HOSPITAL ENCOUNTER (OUTPATIENT)
Dept: ULTRASOUND IMAGING | Facility: HOSPITAL | Age: 36
Discharge: HOME | End: 2021-02-22
Attending: FAMILY MEDICINE

## 2021-02-22 DIAGNOSIS — Z3A.08 8 WEEKS GESTATION OF PREGNANCY: ICD-10-CM

## 2021-02-24 ENCOUNTER — COMMUNICATION - HEALTHEAST (OUTPATIENT)
Dept: NURSING | Facility: CLINIC | Age: 36
End: 2021-02-24

## 2021-02-26 ENCOUNTER — COMMUNICATION - HEALTHEAST (OUTPATIENT)
Dept: NURSING | Facility: CLINIC | Age: 36
End: 2021-02-26

## 2021-02-26 ASSESSMENT — ACTIVITIES OF DAILY LIVING (ADL): DEPENDENT_IADLS:: INDEPENDENT

## 2021-03-01 ENCOUNTER — COMMUNICATION - HEALTHEAST (OUTPATIENT)
Dept: NURSING | Facility: CLINIC | Age: 36
End: 2021-03-01

## 2021-03-25 ENCOUNTER — COMMUNICATION - HEALTHEAST (OUTPATIENT)
Dept: NURSING | Facility: CLINIC | Age: 36
End: 2021-03-25

## 2021-03-26 ENCOUNTER — PRENATAL OFFICE VISIT - HEALTHEAST (OUTPATIENT)
Dept: FAMILY MEDICINE | Facility: CLINIC | Age: 36
End: 2021-03-26

## 2021-03-26 ENCOUNTER — RECORDS - HEALTHEAST (OUTPATIENT)
Dept: ADMINISTRATIVE | Facility: OTHER | Age: 36
End: 2021-03-26

## 2021-03-26 DIAGNOSIS — Z34.82 ENCOUNTER FOR SUPERVISION OF OTHER NORMAL PREGNANCY IN SECOND TRIMESTER: ICD-10-CM

## 2021-03-26 DIAGNOSIS — Z34.91 ENCOUNTER FOR SUPERVISION OF NORMAL PREGNANCY IN FIRST TRIMESTER: ICD-10-CM

## 2021-03-26 DIAGNOSIS — D56.3 ALPHA THALASSEMIA MINOR TRAIT: ICD-10-CM

## 2021-03-26 LAB
ALBUMIN UR-MCNC: NEGATIVE G/DL
FERRITIN SERPL-MCNC: 31 NG/ML (ref 10–130)
GLUCOSE UR STRIP-MCNC: NEGATIVE MG/DL
KETONES UR STRIP-MCNC: NEGATIVE MG/DL
TSH SERPL DL<=0.005 MIU/L-ACNC: 1.32 UIU/ML (ref 0.3–5)

## 2021-03-26 RX ORDER — FERROUS GLUCONATE 324(38)MG
324 TABLET ORAL
Qty: 100 TABLET | Refills: 4 | Status: SHIPPED | OUTPATIENT
Start: 2021-03-26 | End: 2021-09-02

## 2021-03-26 ASSESSMENT — MIFFLIN-ST. JEOR: SCORE: 1170.82

## 2021-04-01 ENCOUNTER — COMMUNICATION - HEALTHEAST (OUTPATIENT)
Dept: FAMILY MEDICINE | Facility: CLINIC | Age: 36
End: 2021-04-01

## 2021-04-15 ENCOUNTER — COMMUNICATION - HEALTHEAST (OUTPATIENT)
Dept: CARE COORDINATION | Facility: CLINIC | Age: 36
End: 2021-04-15

## 2021-04-22 ENCOUNTER — OFFICE VISIT - HEALTHEAST (OUTPATIENT)
Dept: FAMILY MEDICINE | Facility: CLINIC | Age: 36
End: 2021-04-22

## 2021-04-22 DIAGNOSIS — Z34.82 ENCOUNTER FOR SUPERVISION OF OTHER NORMAL PREGNANCY IN SECOND TRIMESTER: ICD-10-CM

## 2021-04-28 ENCOUNTER — COMMUNICATION - HEALTHEAST (OUTPATIENT)
Dept: NURSING | Facility: CLINIC | Age: 36
End: 2021-04-28

## 2021-05-07 ENCOUNTER — HOSPITAL ENCOUNTER (OUTPATIENT)
Dept: ULTRASOUND IMAGING | Facility: HOSPITAL | Age: 36
Discharge: HOME | End: 2021-05-07
Attending: FAMILY MEDICINE

## 2021-05-07 DIAGNOSIS — Z34.82 ENCOUNTER FOR SUPERVISION OF OTHER NORMAL PREGNANCY IN SECOND TRIMESTER: ICD-10-CM

## 2021-05-21 ENCOUNTER — PRENATAL OFFICE VISIT - HEALTHEAST (OUTPATIENT)
Dept: FAMILY MEDICINE | Facility: CLINIC | Age: 36
End: 2021-05-21

## 2021-05-21 DIAGNOSIS — Z34.82 ENCOUNTER FOR SUPERVISION OF OTHER NORMAL PREGNANCY IN SECOND TRIMESTER: ICD-10-CM

## 2021-05-21 LAB
ALBUMIN UR-MCNC: NEGATIVE G/DL
GLUCOSE UR STRIP-MCNC: NEGATIVE MG/DL
KETONES UR STRIP-MCNC: NEGATIVE MG/DL

## 2021-05-25 ENCOUNTER — HOSPITAL ENCOUNTER (OUTPATIENT)
Dept: ULTRASOUND IMAGING | Facility: HOSPITAL | Age: 36
Discharge: HOME | End: 2021-05-25
Attending: FAMILY MEDICINE

## 2021-05-25 DIAGNOSIS — Z34.82 ENCOUNTER FOR SUPERVISION OF OTHER NORMAL PREGNANCY IN SECOND TRIMESTER: ICD-10-CM

## 2021-05-27 NOTE — PATIENT INSTRUCTIONS - HE
Patient Education   HEALTHY PREGNANCY CARE: 30-34 WEEKS PREGNANT    You have made it to the final months of your pregnancy. By now, your baby is starting to fill out with some fat under his skin, fuzzy hair on his shoulders, and is gaining 4 to 6 ounces per week.    Discuss any travel plans with your midwife or physician.    Review possible changes in sexuality during later pregnancy and discuss these with your midwife or physician, as well as your partner. Alternative love-making positions may be more comfortable.    Discuss labor and delivery issues with your midwife or physician. If you had a  birth in the past, discuss a trial of labor with your midwife or physician. He or she may ask that you sign a consent form, if you wish to have a vaginal birth after  (). Ask your midwife or physician to explain your options for managing pain during your labor and delivery. Sometimes, during the birth process, an episiotomy may need to be cut in the vagina to make the opening bigger or let the baby come out quicker. You may want to discuss the episiotomy and how often it is needed with your midwife or physician.    Plan for your baby's care by selecting a child health care provider (Family physician, Pediatrician, or Pediatric Nurse Practitioner). Practice installing an infant car seat correctly in the car. Ask for car seat information as needed and make sure it is safe and will work in the car your baby will ride in. You will need a car seat to bring your baby home from the hospital. Check the procedure for adding your baby to your health care plan. Review your decision about circumcision and ask for any information you need. As you buy and receive items for your baby, don't put a baby walker on your list. Walkers can be dangerous and can cause serious injury to your child. A safer option is a saucer-type play station, since it doesn't allow baby to travel across the floor.    Discuss your choices and  plans for birth control with your midwife or physician. Women who are breastfeeding can still become pregnant. Use a birth control method if you want to lower your pregnancy risk. Talk to your midwife or physician if you are considering permanent birth control, such as tubal ligation or Essure. You may need to complete a consent form 30 days prior to delivery in order to have this done after you deliver.    Continue to watch for signs and symptoms of preeclampsia:     Sudden swelling of your face, hands, or feet     New vision problems such as blurring, double vision, or flashing lights    A severe headache not relieved with acetaminophen (Tylenol)    Sharp or stabbing pain in your right or middle upper abdomen    Watch for signs and symptoms of premature labor:     Regular contractions. This means having about 6 or more within 1 hour, even after you have had a glass of water and are resting.     A backache that starts and stops regularly.    An increase or change in vaginal discharge, such as heavy, mucus-like, watery, or bloody discharge.     Your water breaks or leaks.    If you have any of the above symptoms or any other concerns, call your provider or their clinic staff at [facility] at Phone: [phone]. If it's after clinic hours, physician patients should call the Care Connection at [phone]); midwife patients should call their answering service at [phone].    How can you care for yourself at home?   You can refer to the Starting Out Right book or find it online at [email or web address] or [email or web address]/images/[email or web address]/flipbooks/[email or web address]-starting-out-right/[facility]-starting-out-right.html#p=8     You can sign up for a weekly parenting e-mail that gives support, tips and advice from health care professionals that starts with pregnancy and continues through the toddler years. To register,  go to [email or web address] at any time during your pregnancy.

## 2021-05-27 NOTE — PROGRESS NOTES
Baby is sitting very low- lots of pressure when standing or walking.  Reviewed  labor s/sx.  Regular fetal movement.  Increased low back pain.  Hard to carry toddler now.  Reviewed strategies for coping with back pain.  Pt desires maternity belt- given today.  No bleeding or LoF.  No headaches or dizziness.  No swelling.  Weight loss reviewed - pt has been cut back on rice due to elevated 1hr GTT with normal 3hr GTT.  Reviewed birth control options for pt who desires to get period monthly.

## 2021-05-28 NOTE — PROGRESS NOTES
Chart Review completed.  No further nursing needs identified. [facility] RN removing her name from the Care Team.    Goals        Patient Stated      I want to ensure my unborn child gets medical insurance in May/June 2019. (pt-stated)      Action steps to achieve this goal  1.  I will speak with the SW in the hospital to notify them of my wishes to obtain medical insurance.  2.  I will make a plan to make sure that my child is able to obtain insurance.    Date goal set:  12/14/18  Updated: 3/8/19

## 2021-05-28 NOTE — PATIENT INSTRUCTIONS - HE
Patient Education   HEALTHY PREGNANCY CARE: 37 to 41 WEEKS PREGNANT    Talk with your midwife or physician about when to call with signs of labor    Regular uterine contractions that are getting closer together and/or stronger    If you think your water has broken or is leaking    Bleeding from the vagina like a period (bloody vaginal discharge is normal)    If you are not feeling your baby move    Make plans for transportation and  as needed for when you are going to the hospital.    Your midwife or physician may offer to check your cervix for changes.     Ask your health care provider about vaccinations you may need following delivery. By now, you should have received a Tdap immunization to protect against pertussis or whooping cough. Fathers and family members who will be in close contact with the baby should also receive a Tdap shot at least two weeks before the expected birth of the baby if they have not had a Td (tetanus) shot for at least two years.    If you are past your due date, discuss the next steps leading to delivery with your midwife or physician. If you don't start labor on your own by 41 or 42 weeks, your midwife or physician may recommend giving you medicines to ripen your cervix and start labor.    Preparing for your baby: Tell your midwife or physician how you plan to feed your baby (breast or bottle), who you have chosen to do pediatric care for your baby, and if you have a boy, whether you have chosen to have him circumcised. You will need a car seat correctly installed in your vehicle to bring your baby home. As you start to set up the nursery at home for your baby, make sure the crib is safe. The mattress needs to fit snugly against the edges of the crib. If you can fit a soda can between the bars, they are too far apart and can allow the baby's head to caught between them.    Learn about infant care and feeding, including information about infant CPR. We recommend that you put  your baby to sleep on his or her back to reduce the chance of Sudden Infant Death Syndrome (SIDS). To maintain a healthy environment in which your child can grow, it's best to keep your home smoke-free. By preparing ahead, your transition into parenthood will go smoothly for you and your baby.    Your midwife or physician will want to see you for a checkup 2 to 6 weeks after delivery.    If you have questions about any symptoms you are experiencing or any other concerns, call your provider or their clinic staff at [facility] at Phone: [phone]. If it's after clinic hours, physician patients should call the Care Connection at [phone]); midwife patients should call their answering service at [phone].    How can you care for yourself at home?   You can refer to the Starting Out Right book or find it online at [email or web address]/images/stories/maternity/[facility]-[email or web address]-Right.pdf or [email or web address].[email or web address]/images/stories/flipbooks/[facility]-starting-out-right/[facility]-starting-out-right.html#p=8     You can sign up for a weekly parenting e-mail that gives support, tips and advice from health care professionals that starts with pregnancy and continues through the toddler years. To register, go to [email or web address] at any time during your pregnancy.        Making Plans for Feeding My Baby    By this point, you probably have read a lot about feeding your baby.  Breastfeed or formula? Each mother s decision is her own and [facility] respects you and your choices. We ve gathered information on both breastfeeding and formula feeding to help with your decision. Talking with your physician or nurse-midwife can also help in your decision.  However you plan to feed your baby, [facility] encourage rooming in with your baby, skin-to-skin contact and feeding your baby based on his or her cues.    Skin-to-skin contact  Being close to mom  helps your baby adjust to life outside of the womb.  It helps your baby regulate their body temperature, heart rate, and breathing.  Your baby will usually be placed skin-to-skin immediately following birth or as soon as possible, if medical intervention is needed.    Rooming-In  Having your baby stay with you in your room is called  rooming-in .  Keeping your baby in your room helps you to learn how to care for your baby by getting to know your baby s cues, body rhythms and sleep cycle.       Cue-based feeding  Cues (signals) are baby s way of telling you what he or she wants.  When you learn your infant s cues, you know how to care for and feed your baby.   Feeding cues are the licking and smacking of lips, bringing their fist to their mouth, and a reflex called  rooting - where baby turns and opens his or her mouth, searching for the breast or bottle.  Crying is a late feeding cue.  Babies can feed frequently, often at least 8 times in 24 hours.  Breastfeeding facts  Breast milk is the best source of nutrition for your baby and is available at birth. In the first couple of days, your milk volume is already starting to increase, though it may not be noticeable. Breastfeed frequently to increase your milk supply. Within three to five days, you will begin to notice larger milk volumes. An increase in breast size, heaviness and firmness are often described as the milk  coming in.  Frequent breastfeeding can help breasts from getting overly firm and painful. You will know the baby is getting enough milk if your baby is having wet and dirty diapers and gaining weight.     If your goal is to exclusively breastfeed, it is important to not use any formula or artificial nipples (including bottles and pacifiers) while your baby is learning to breastfeed.  While it may seem like an  easy  option to give your baby a bottle, formula should only be given if there is a medical reason for your baby to have it.    Positioning and  attachment   Get comfortable.  Use pillows as needed to support your arms and baby.  Hold baby close at the level of your breast, facing you in a tummy to tummy position.  Skin to skin helps with this.  Position the baby with his or her nose by the nipple.  There should be a straight line from baby s ear to shoulder to hips.  Tickle your baby s lips or wait for baby to open mouth wide, bring baby to breast by leading with the chin.  Aim the nipple at the roof of baby s mouth.  A rapid sucking pattern is followed by longer, drawing pattern with occasional swallows heard.  When baby is correctly latched, your nipple and much of the areola are pulled well into baby s mouth.      Returning to work or school  Focus on a good start to breastfeeding.  Many women continue to provide breastmilk for their baby when they return to work or school.  Making plans about where to pump and store milk can make the transition go well.  Talk with other mothers who have also returned to work or school for tips and support.  Your employer s Human Resource department may be a resource as well.     Returning to work or school: (continued)     babies can mean fewer  sick  days for you.    A quality breast pump will also save time and add comfort.  Check with your insurance prior to giving birth for breast pump coverage.  Many insurance companies include a pump within your benefits.    Wait until your baby is at least three weeks old to introduce a bottle for the first time.  Have someone besides you give the bottle.    Breastfeed when you are with your baby. Reserve your bottles of breast milk for when you are away.     Your breasts will need to be  emptied  either by your baby or a pump.  Plan to pump at least twice in an eight hour day.    If you cannot pump at work, continue breastfeeding at home. Any amount of breast milk is worth giving to your baby.    Formula feeding facts  If you are planning to use formula to feed your  baby, you will want to make some preparations ahead of time. Talk to your doctor or nurse-midwife about what type of formula to use. Some are iron-fortified, meaning they have extra iron in them. You will want to purchase formula and bottles before your baby is born to be sure you are ready after you return from the hospital. The [facility] do not provide formula samples to take home.    Be sure to follow formula mixing directions closely. Regular milk in the dairy case at the grocery store should not be given to babies under 1 year old. Baby formula is sold in several forms including:    Ready-to-use. This is the most expensive, but no mixing is necessary.    Concentrated liquid. This is less expensive than ready-to-use and you mix with water.    Powder. This is the least expensive. You mix one level scoop of powdered formula with two ounces of water and stir well.    Most babies need 2.5 ounces of formula per pound of body weight each day. This means an 8-pound baby may drink about 20 ounces of formula a day; however, this is just an estimate. The most important thing is to pay attention to your baby s cues.  If your baby is always fussy, needs more iron or has certain food allergies, your physician may suggest you change your baby s formula to a different kind.   How do I warm my baby s bottles?  You may feed your baby a bottle without warming it first. It is OK for the breast milk or formula to be cool or room temperature. If your baby seems to prefer it warmed, you can put the filled bottle in a container of warm water and let it stand for a few minutes. Check the temperature of the liquid on your skin before feeding it to your baby; to be sure it isn t too hot. Do not heat bottles in the microwave. Microwaves heat food and liquids unevenly, and this can cause hot spots that can burn your baby.    How do I clean and sterilize bottles?  Sterilize bottles and nipples before you use them for the first  time. You can do this by putting them in boiling water for 5 minutes. After that first time, you can wash them in hot and soapy water. Rinse them carefully to be sure there is no soap left on them. You can also wash them in the .    [facility] [phone] ([phone])

## 2021-05-28 NOTE — PROGRESS NOTES
Upon completing chart review; it was determined patient is still pregnant.  Patient does not have insurance for her unborn child.  [facility] will keep patient open with Insurance goal until child is born and insurance is obtained.  Time frame is expected to be another 4-6 weeks.  Then will transition patient to [facility] Maintenance.

## 2021-05-28 NOTE — PROGRESS NOTES
Doing well- no concerns.  Regular fetal movement.  No CTXs.  Starting to get more back pain that feels like early labor at times.  No bleeding or LOF.  Reviewed neg GBS from last visit.

## 2021-05-28 NOTE — PROGRESS NOTES
: Robel ID#: [id number] Agency: Language Line    The patient's child does have coverage, the goal was completed. There are no other concerns for the patient. The Care guide sent a message to the RN to preform a Chart Review for the patient to move to Maintenance.     Upcoming Appointments:  5/13/19 at 8:40 with Dr. Rae  5/23/19 at 9:00 with Dr. Rae  5/28/19 at 8:40 with Dr. Rae  6/10/19 at 8:40 with Dr. Rae  7/15/19 at 8:40 with Dr. Rae    Next Outreach: 5/23/19

## 2021-05-28 NOTE — PROGRESS NOTES
Reviewed normal BPP with EFW at 77% at 34 weeks.  Doing well no concerns.  Regular fetal movement.  No bleeding or LOF.  No cramping but getting some back pain similar to labor at times that lasts about a minute worse in am and pm.  No swelling or headaches.  Feels lightheaded when she is standing too long.  Eating 2 meals/day and watching her rice- more veggies and meat.

## 2021-05-29 NOTE — PROGRESS NOTES
: Bassam ID#: [id number] Agency: Language Line    Scheduled Follow Up Call: Attempt 1  Care Guide called and left a message for the patient.  If the patient is returning my call, please transfer them to me, Brandon Martinez, at [phone].    Upcoming Appointments:  5/28/19 at 8:40 with Dr. Rae  6/10/19 at 8:40 with Dr. Rae  7/15/19 at 8:40 with Dr. Rae    Next Outreach: 5/31/19

## 2021-05-29 NOTE — PROGRESS NOTES
Programs Applying For: Add a [place]   [place]: [place]  Case #:  [facility] Worker:   Nery #:   PMI #:   Tracking:   Date Applied:     Outreach:   19: [facility] called [facility],  was already added to mom's insurance, [place] effective . [facility] updated 's registration and sent message to HE billing to reprocess claims. No further needs from [facility]. Removing patient from [facility] panel.     Health Insurance Information:    [place]     Referral:   The patient had given birth to her baby and she stated that she did not talk to anybody about insurance for him, his MRN is [id number]. He is needing insurance at this time.

## 2021-05-29 NOTE — PROGRESS NOTES
Here with daughter for manny 17mo [facility] - legally blind in right eye.      Notes some contraction- many in 2 hours and then they will go away.  No bleeding.  No lof.  Regular fetal movement.  Everything ready for baby.  Does note some swelling in her ankles.

## 2021-05-29 NOTE — PROGRESS NOTES
: Bassam ID#: [id number] Agency: Language Line    The patient stated that she was doing ok. She was not able to talk to anybody about insurance for her baby. The Care guide stated that she will call her in 2 weeks to check on the status of her child.     Upcoming Appointments:  6/10/19 at 8:40 with Dr. Rae  7/15/19 at 8:40 with Dr. Rae    Next Outreach: 6/12/19

## 2021-05-29 NOTE — PROGRESS NOTES
: Arie ID#: [id number] Agency: Language Line    The patient stated that her son is now at home and that he is doing better. The Care guide asked about insurance for him and she stated that she did not talk to anybody for insurance yet. A referral was placed to the [facility] for her son, MRN: [id number]. No other concerns.    Upcoming Appointments:  8/6/19 at 8:40 with Dr. Rae    Next Outreach: 7/12/19  
4

## 2021-05-30 NOTE — TELEPHONE ENCOUNTER
Pt transferred from scheduling.    ID: [id number]  Severe back pain.    Once prescription wears off she can barely walk.    Rates pain severe enough she gets teary 9/10. Constant since running out of medication 3 days ago.    does not get off until 2am.  RN advised of 911 as an option if she is unable to walk.     Reason for Disposition    Unable to walk    Protocols used: BACK PAIN-A-AH    Triaged to disposition of go to ED now.  Pt stated understanding to recommendations but declined as she does not have a way to get to ED.  RN did advise of 911 as patient states she has to crawl and is unable to walk due to the pain.    Pt requested to schedule appointment in clinic tomorrow and declined going to ED as pt states she does not have anyone to drive her or watch her children for her to be able to go.   RN again advised of recommendation to be seen tonight due to severe pain but pt requests to schedule appt.   Pt transferred to PSR to schedule.   Mary Grider RN   [facility] RN Triage

## 2021-05-30 NOTE — PROGRESS NOTES
Spent over 25 minutes with patient in room with  as well, greater than 50% of this counseling.  Assessment/ Plan  1. Acute bilateral low back pain without sciatica    No definite neuropathic symptoms.  No red flags.    Analgesics prescribed : Recommend combination of ibuprofen and acetaminophen, ruled out these recommendations for her and prescribed 200 mg ibuprofen pills which are more palatable to her than the large 800 mg she got at the time of her delivery.  Physical/ mechanical interventions: referral to PT recommended but obviously she is unable to do that as she does not have somebody else to watch her kids.  Instead, recommend she visit Youtube- for exercises   Recommend remaining physically active  Work/ activity restrictions provided? No   Follow-up:  as needed, if not improving over the next 3 to 4 weeks    - ibuprofen 200 mg cap; 3 po. every 6 hours as needed pain  Dispense: 200 each; Refill: 3    2. Greater trochanteric bursitis of both hips  As above, encouraged walking  - ibuprofen 200 mg cap; 3 po. every 6 hours as needed pain  Dispense: 200 each; Refill: 3    Body mass index is 31.79 kg/m .    Subjective  CC:  Chief Complaint   Patient presents with     Back Pain     lower since 05/25     HPI:  33-year-old female who usually sees Dr. Rae, history of normal delivery and acne vulgaris comes in for back pain.    Patient had normal vaginal delivery on 5/25/2019.    Using pain medication, believe ibuprofen    Back pain  ---------------------  Duration/timing- since late may- with nvd  Location/ radiation- both sides low back, lat hip, into butt and lat hips  Mild but  Quality/Severity-burning- some numbness of both feet  Context/modifying factors-movement  Red flags- progressive weakness, weight loss/ fever, night-time awakening?- no  Back pain history  previous evaluation, treatment, imaging? - no  Needing to  kids  Patient Active Problem List   Diagnosis     Normal delivery      Supervision of normal pregnancy     High risk HPV infection     Acne vulgaris     Current medications reviewed as follows:  Current Outpatient Medications on File Prior to Visit   Medication Sig     clindamycin-benzoyl peroxide (BENZACLIN PUMP) gel Use on affected areas of face hs     docusate sodium (COLACE) 100 MG capsule Take 1 capsule (100 mg total) by mouth daily.     ibuprofen (ADVIL,MOTRIN) 800 MG tablet Take 1 tablet (800 mg total) by mouth every 8 (eight) hours.     MAPAP EXTRA STRENGTH 500 mg tablet Take 1 tablet (500 mg total) by mouth as needed for pain.     prenatal vit no.112-folate no6 1 mg Chew Chew 1 tablet daily.     white petrolatum-mineral oil (EUCERIN) Crea Apply to skin daily.     No current facility-administered medications on file prior to visit.      Social History     Tobacco Use   Smoking Status Never Smoker   Smokeless Tobacco Never Used   Tobacco Comment    no exposure     Social History     Social History Narrative     Not on file     Patient Care Team:  Selene Rae MD as PCP - General (Family Medicine)  [place] [facility]   ROS  Full 10 system review including constitutional, respiratory, cardiac, gi, urinary, rheumatologic, neurologic, reproductive, dermatologic psychiatric is  performed  and is otherwise negative         Objective  Physical Exam  Vitals:    07/12/19 1047   BP: 110/76   Patient Site: Left Arm   Patient Position: Sitting   Cuff Size: Adult Regular   Pulse: 64   Resp: 14   Weight: 127 lb (57.6 kg)     Back examined,   Grossly noscoliosis  no obvious abnormality on inspection  Moderate sParaspinous muscle tendernessright  nospinous process tenderness.   Midline  Moderate Alex on palapation of  sacroiliac region. bilaterally  nospain on palpation of  ischial tuberosity{ bilaterally  Negativestraight leg raise bilaterally   Patient able to walk on toes and heels and step up on step (knee extension) without difficulty.-Though very demonstrative of  pain when it was being observed.  She stepped right up on the table later on  Significant tenderness over greater trochanters on both sides   Reflexes intact and symmetric.    Diagnostics  None    Please note: Voice recognition software was used in this dictation.  It may therefore contain typographical errors.

## 2021-05-31 VITALS — WEIGHT: 131 LBS | BODY MASS INDEX: 27.38 KG/M2

## 2021-05-31 VITALS — BODY MASS INDEX: 25.92 KG/M2 | WEIGHT: 124 LBS

## 2021-05-31 VITALS — BODY MASS INDEX: 27.59 KG/M2 | WEIGHT: 132 LBS

## 2021-05-31 VITALS — BODY MASS INDEX: 27.17 KG/M2 | WEIGHT: 130 LBS

## 2021-05-31 VITALS — BODY MASS INDEX: 23.09 KG/M2 | WEIGHT: 110 LBS | HEIGHT: 58 IN

## 2021-05-31 VITALS — WEIGHT: 126 LBS | BODY MASS INDEX: 26.33 KG/M2

## 2021-05-31 VITALS — WEIGHT: 121 LBS | BODY MASS INDEX: 25.29 KG/M2

## 2021-05-31 NOTE — PROGRESS NOTES
Programs Applying For: Health Insurance   County: [place]  Case #:   [facility] Worker:   Nery #:   PMI #:       Outreach:   8/28/19: Pt [place] [place] on 8/9. FRG will check after 9/1 to see if [employer] is active.  8/15/19: FRG called pt to follow up and left VM. FRG will call again next week. Attempt x 1.  8/8/19: FRG and pt called [employer] together. Address will need to be changed at [facility]. Pt has a premium of $16. Income on file is $33,000. Pt agrees that this sounds correct. FRG advised that pt pay premium asap. FRG gave pt address of [employer] building. Pt's spouse is going to go there tomorrow and FRG will follow up next week.   8/7/19: FRG called [facility] to see why MA ended on 7/31. Pt and spouse are now eligible for [employer]. The 2 children are still eligible for MA. Income on file (0 for patient, $1,088 biweekly for spouse). FRG called pt, she stated that income is correct but they have not received any notices from [facility]. They did move a month ago. FRG advised calling [employer] together tomorrow morning to update address and find out premium costs.    Health Insurance Information:    MA    Referral:   The patient is needing assistance with her insurance.

## 2021-05-31 NOTE — PROGRESS NOTES
Chief Complaint:  Chief Complaint   Patient presents with     Postpartum Care     Concerns     pain from both hips into legs, taking tylenol and ibuprofen       Postpartum Visit  Patient is here for a postpartum visit. She is 10 weeks postpartum following a spontaneous vaginal delivery. I have fully reviewed the prenatal and intrapartum course. The delivery was at 39 6/7 gestational weeks. Outcome: spontaneous vaginal delivery with episiotomy due to fetal bradycardia Anesthesia: nitrous oxide.  Birth [id number]: 8lb ?oz.  Gender boy.     Postpartum course has been healthy.  Baby's course has been healthy. Baby is feeding by pumped for 10 days and otherwise formula. Bleeding not now but did get one period- spotting for a couple of days last week.  Postpartum bleeding for 4 weeks.  Bowel function is normal. Bladder function is abnormal: still leak urine with sneezing. . Patient is sexually active and some pain with intercourse.  Next baby no more baby per pt and her  but her  doesn't want to use birth control.  Pt aware of options for her body.  Contraception method considering is none vs IUD. Postpartum depression screening: negative. WIC normal per pt.  Exercise stretching at home- limited due to pain and childcare.  Total Weight Gain during pregnancy 25 lb (11.3 kg) and Weight loss so far 22lb.  Return to work/school stay at home with 2 young kids- no transportation or family/friends in town.    Following concerns include:hip and leg pain since delivery- did see Dr Hairston 7/12 and dx with acute low back pain and bursitis of hips.  Recommended ibuprofen and stretching at home.  Not able to do PT due to  issues.      Having sharp pain above left thigh pain.     Vaginal itching and white discharge if she doesn't wash well enough.      High risk HPV 12/2018 without abnormal pap smear.     ROS:  10 point review of symptoms all negative except as outlined in the HPI above.    Med list, active problem  list, PMH, Surgical Hx, Family Hx reviewed and updated as part of this encounter    Physical Exam:  /84 (Patient Site: Right Arm, Patient Position: Sitting, Cuff Size: Adult Small)   Pulse (!) 56   Wt 125 lb (56.7 kg)   LMP 08/04/2019 (Exact Date)   BMI 31.29 kg/m   Body mass index is 31.29 kg/m .  Vital signs reviewed    Wt Readings from Last 3 Encounters:   08/06/19 125 lb (56.7 kg)   07/12/19 127 lb (57.6 kg)   05/25/19 147 lb (66.7 kg)       EPDS Score: 3    Physical Exam:  All normal as below except abnormalities include: grossly normal exam  General is a  33 y.o. female sitting comfortably in no apparent distress.   HEENT:  TM are clear bilaterally.  Eye, nasal, oral exams within normal   Neck: Supple without lymphadenopathy or thyromegally  CV: Regular rate and rhythm S1S2 without rubs, murmurs or gallops,   Lungs: Clear to auscultation bilaterally  Abd:  +BS, soft NT/ND,  No masses or organomegally,   Diastasis 1cm  Extremities: Warm, No Edema, 2+ Pedal and radial pulses bilaterally  Skin: No lesions or rashes noted  Neuro: Able to ambulate around the exam room with equal movement, strength and normal coordination of the upper and lower extremeties symmetrically  Pelvic:Normally developed genitalia with no external lesions or eruptions. Vagina and cervix show no lesions, inflammation, discharge or tenderness. No cystocele, No rectocele. Uterus nongravid.  No adnexal mass or tenderness.      Results for orders placed or performed in visit on 08/06/19   Wet Prep, Vaginal   Result Value Ref Range    Yeast Result No yeast seen No yeast seen    Trichomonas No Trichomonas seen No Trichomonas seen    Clue Cells, Wet Prep No Clue cells seen No Clue cells seen   Chlamydia trachomatis & Neisseria gonorrhoeae, Amplified Detection   Result Value Ref Range    Chlamydia trachomatis, Amplified Detection Negative Negative    Neisseria gonorrhoeae, Amplified Detection Negative Negative        Assessment/Plan:  Discussed return to work/school plans.  Postpartum depression assessment and coping techniques. Fatigue and sleep strategies.  Breastfeeding and pumping as needed. Body changes and exercise/weight loss techniques.  Timing of next pregnancy and birth control prescribed as below.  Next pap smear due now.      1. Postpartum follow-up  Ongoing MSK pain related to pregnancy, labor/delivery.  Need for postpartum strengthening program to allow full healing prior to next pregnancy and before she develops chronic pain. Pt has limited access to healthcare due to childcare issues and rides. Will have pt f/u with CCC on getting assistance as able for rides and to explore  options for her for short term.  Pt should start chiropractic and PT and gentle exercise program at home.    - Wet Prep, Vaginal  - Chlamydia trachomatis & Neisseria gonorrhoeae, Amplified Detection  - Gynecologic Cytology (PAP Smear)  - HPV High Risk DNA Cervical    2. High risk HPV infection  - Gynecologic Cytology (PAP Smear)  - HPV High Risk DNA Cervical    3. Female stress incontinence  Recommend PT program for core and pelvic floor rehab.

## 2021-05-31 NOTE — PROGRESS NOTES
: Barrett ID #: [id number] Agency: Language Line    The patient understands that she needs to pay the premium of $16 right now to obtain insurance. The CHW consulted with FRG about the status of her insurance. The FRG was informed that the patient went to pay her premium on 8/9/19. The FRG will continue to check the status of the patient's insurance. The CHW explained Maintenance again and the patient understood. She understands that if there are other concerns that she can call the CHW.    Chart Routed to the RN to complete a Chart Review for Maintenance.    Upcoming Appointments:  None    Next Outreach: 8/30/19

## 2021-05-31 NOTE — PROGRESS NOTES
Clinic Care Coordination Contact    Follow Up Progress Note        S= Situation: Chart review of clinic care coordination enrollment status   B= Background: Patient worked with G to address concern about insurance coverage and premium payment. Patient completed goals. No new needs identified   A= Action Steps:Outreach in 2 months per protocol. Clinic care coordination will be available for patient before if needed.   R= Recommendation: Transition to maintenance status  Care guide Delegations from RN CC : Please send maintenance plan to patient and outreach per standard work protocol       Emergency Plan Recommendation:    When to Use the Emergency Department (ED)  An emergency means you could die if you don t get care quickly. Or you could be hurt permanently (disabled). Read below to know when to use -- and when not to use -- an emergency department (also called ED).    Dangers to your life  Here are examples of emergencies. These need immediate care:  A hard time breathing  Severe chest pain  Choking  Severe bleeding  Suddenly not able to move or speak  Blacking out (fainting)`  Poisoning    Dangers of permanent injuries  Here are other emergencies. These also need immediate care:  Deep cuts or severe burns  Broken bones, or sudden severe pain and swelling in a joint    When it s an emergency  If you have an emergency, follow these steps:    1. Go to the nearest emergency department  If you can, go to the hospital ED closest to you right away.  If you cannot get there right away, or if it is not safe to take yourself, call 911 or your police emergency number.  2. Call your primary care doctor  Tell your doctor about the emergency. Call within 24 hours of going to the ED.  If you cannot call, have someone call for you.  Go to your doctor (not the ED) for any follow-up care.    When it s not an emergency  If a problem is not an emergency, follow these steps:    1. Call your primary care doctor  If you don t know  the name of your doctor, call your health plan.  If you cannot call, have someone call for you.  2. Follow instructions  Your doctor will tell you what you should do.  You may be told to see your doctor right away. You may be told to go to the ED. Or you may be told to go to an urgent care center.  Follow your doctor s advice.

## 2021-05-31 NOTE — PROGRESS NOTES
My Clinic Care Coordination Wellness Plan  This Maintenance Wellness Plan provides private information in regard to the work I have done with my Care Team at my Primary Care Clinic.  This document provides insight on the goals I have worked hard to achieve.  My Care Team congratulates me on my journey to become well.  With the assistance of the Clinic Care Coordination Team and my Primary Care Provider, I have succeeded in improving areas of my health that I identified as barriers to becoming well.  I will continue to seek wellness and use the skills I have obtained to further my journey.  My Community Health Worker will follow up with me in two months.  In the meantime, if I should have any questions or concerns I will contact my Community Health Worker.    [address]  [address] [phone]    My Preferred Method of Contact:  Phone: [phone]    My Primary/Preferred Language:  Hmong    Preferred Learning Style:  Face to face discussion, Pictures/Diagrams and Hands on teaching    Emergency Contact: Extended Emergency Contact Information  Primary Emergency Contact: Tiffany Barrow  Address: [address] [place] [address] [place]  Mobile Phone: [phone]  Relation: Spouse  Secondary Emergency Contact: None, Per Pt   [place]  Relation: Declined     PCP:  Selene Rae MD  Specialists:    Care Team            Selene Rae MD   [phone] PCP - General, Family Medicine    [place] [facility]      Mary Martinez SABI   [phone] Community Health Worker, Primary Care - CC    Laura Dumont, RN Lead Care Coordinator, Primary Care - CC    Saskia Magallon   [phone] Financial Resource Worker, Primary Care - CC          Accomplishments:  Goals        Patient Stated      COMPLETED: My baby has medical insurance. (pt-stated)      Personal Plan  I will make sure that I update the  [facility] with any changes in address or in my financial situation.            Advanced Directive/Living Will: The patient was given information regarding Adanced Directives/Living Will     Emergency Plan:    Emergency Plan Recommendation:     When to Use the Emergency Department (ED)  An emergency means you could die if you don t get care quickly. Or you could be hurt permanently (disabled). Read below to know when to use -- and when not to use -- an emergency department (also called ED).     Dangers to your life  Here are examples of emergencies. These need immediate care:  A hard time breathing  Severe chest pain  Choking  Severe bleeding  Suddenly not able to move or speak  Blacking out (fainting)`  Poisoning     Dangers of permanent injuries  Here are other emergencies. These also need immediate care:  Deep cuts or severe burns  Broken bones, or sudden severe pain and swelling in a joint     When it s an emergency  If you have an emergency, follow these steps:     1. Go to the nearest emergency department  If you can, go to the hospital ED closest to you right away.  If you cannot get there right away, or if it is not safe to take yourself, call 911 or your police emergency number.  2. Call your primary care doctor  Tell your doctor about the emergency. Call within 24 hours of going to the ED.  If you cannot call, have someone call for you.  Go to your doctor (not the ED) for any follow-up care.     When it s not an emergency  If a problem is not an emergency, follow these steps:     1. Call your primary care doctor  If you don t know the name of your doctor, call your health plan.  If you cannot call, have someone call for you.  2. Follow instructions  Your doctor will tell you what you should do.  You may be told to see your doctor right away. You may be told to go to the ED. Or you may be told to go to an urgent care center.  Follow your doctor s advice.     Clinical Emergency Plan    [facility]  patients have access to a Nurse 24 hours a day, 7 days a week.  If you have questions or want advice from a Nurse, please know [facility] is here for you.  You can call your clinic and they will connect you or you can call [facility] at [phone].  [facility] also has Walk In Care clinics in multiple locations.  Call the number listed above for more information about our Walk In Care clinics or visit the [facility] website at [email or web address].

## 2021-05-31 NOTE — PROGRESS NOTES
: Barrett ID #: [id number] Agency: Language Line    The patient's son has medical insurance. There are no other concerns for this patient. The CHW routed the encounter to the RN to complete a Chart Review for Maintenance.     Upcoming Appointments:  8/6/19 at 8:40 with Dr. Rae    Next Outreach: 8/16/19

## 2021-05-31 NOTE — PROGRESS NOTES
Programs Applying For: Health Insurance   County: [place]  PMI #: [id number]  Tracking: August 2020 for September 2020      Outreach:   9/3/19: [employer] is active effective 9/1/19. Added to registration. Message sent to [facility] billing to discuss current balance. No further needs from FRG at this time.   Closed Encounter  8/28/19: Pt paid [employer] premium on 8/9. FRG will check after 9/1 to see if [employer]care is active.  8/15/19: FRG called pt to follow up and left VM. FRG will call again next week. Attempt x1.  8/8/19: FRG and pt called [employer] together. Address will need to be changed at [facility]. Pt has a premium of $16. Income on file is $33,000. Pt agrees that this sounds correct. FRG advised that pt pay premium asap. FRG gave pt address of [employer] building. Pt's spouse is going to go there tomorrow and FRG will follow up next week.   8/7/19: FRG called [facility] to see why MA ended on 7/31. Pt and spouse are now eligible for [employer]. The 2 children are still eligible for MA. Income on file (0 for patient, $1,088 biweekly for spouse). FRG called pt, she stated that income is correct but they have not received any notices from [facility]. They did move a month ago. FRG advised calling [employer] together tomorrow morning to update address and find out premium costs.    Health Insurance Information:   FF: [facility].  Elig Type M2: [employer] group II  Eligibility Begin Date: 09/01/2019  Eligibility End Date: --/--/----    FF01 - [employer] delivered through [facility]. The phone numbers are: [phone]    Referral:   The patient is needing assistance with her insurance.

## 2021-06-01 NOTE — PROGRESS NOTES
Programs Applying For: Health Insurance   County: [place]  [employer] #: [id number]  PMI #: [id number]  Tracking: August 2020 for September 2020      Outreach:   9/9/19: FRG called [facility] billing, they will process pt's current balance of $303. FRG called pt to discuss. In the future if pt has a bill that she cannot pay, she will need to call [facility] billing. No further needs at this time.   9/6/19: FRG received message that pt has a bill from . FRG will call HE billing on 9/9 to discuss.   9/3/19: [employer] is active effective 9/1/19. Added to registration. Message sent to HE billing to discuss current balance. No further needs from [facility] at this time.     Health Insurance Information:   FF: [facility].  Elig Type M2: [employer] group II  Eligibility Begin Date: 09/01/2019  Eligibility End Date: --/--/----    FF01 - [employer] delivered through [facility]. The phone numbers are: [phone]    Referral:   The patient is needing assistance with her insurance.

## 2021-06-02 VITALS — HEIGHT: 58 IN | WEIGHT: 122.5 LBS | BODY MASS INDEX: 25.72 KG/M2

## 2021-06-02 VITALS — BODY MASS INDEX: 26.91 KG/M2 | WEIGHT: 128.75 LBS

## 2021-06-02 VITALS — WEIGHT: 137 LBS | BODY MASS INDEX: 28.63 KG/M2

## 2021-06-02 VITALS — WEIGHT: 140 LBS | BODY MASS INDEX: 29.26 KG/M2

## 2021-06-02 VITALS — BODY MASS INDEX: 25.08 KG/M2 | WEIGHT: 120 LBS

## 2021-06-02 NOTE — PROGRESS NOTES
: Casi ID #: [id number] Agency: Language Line    The patient called the CHW confused about her insurance. She was unsure about having [facility] for her insurance. The CHW explained that the patient has [employer] but that is provided through [facility] insurance. The patient then understood. The patient then asked about her insurance card and the CHW looked through her chart and saw an insurance card on file with the josiane PMI as what her insurance has now and asked if she still had that card. The patient did and she will try to use that card for future appointments. The CHW then informed her that she can call the phone number on the back of the card also to find out if she can receive a new insurance card. No other concerns at this time.    Next Outreach: 11/1/19    Patient is in Maintenance at this time.

## 2021-06-02 NOTE — PROGRESS NOTES
Subjective:      Shaila Savage is a 33 y.o. female who presents for evaluation of threatened miscarriage.  Based on her LMP, she is approximately 6 weeks 2 days pregnant.  She delivered a baby in May 2019.  She has not yet been using anything for birth control.  She had a positive test at home.  This morning she noticed some blood when she was urinating.  She became concerned for the pregnancy and showed up here in hopes of being seen as a walk-in patient.  I had her leave some urine for a pregnancy test here in the clinic.  She tells me that while she was urinating, she passed something that she thought was perhaps tissue.  She took it out of the urine cup and has it in her purse.  She opened this and showed me, some blood present, 3 cm size mass that appears to be tissue, pinkish in color.  She has not really been seen for any pregnancy care so far.  She had negative gonorrhea/chlamydia/trichomonas testing in August 2019.    Patient Active Problem List   Diagnosis     Acne vulgaris     Female stress incontinence        Current Outpatient Medications:      MAPAP EXTRA STRENGTH 500 mg tablet, Take 1 tablet (500 mg total) by mouth as needed for pain., Disp: 30 tablet, Rfl: 3    Objective:     Allergies:  Patient has no known allergies.    Vitals:    10/08/19 1022   BP: 110/68   Pulse: 76   Resp: 14     Body mass index is 32.1 kg/m .    General: Alert and oriented x 3, in no apparent distress  Genitourinary: External genitalia is normal in appearance, vaginal walls are healthy, cervix generally obscured by large amount of blood     Results for orders placed or performed in visit on 10/08/19   Pregnancy (Beta-hCG, Qual), Urine   Result Value Ref Range    Pregnancy Test, Urine Positive (!) Negative   Other labs pending.    Assessment and Plan:     1.  Threatened miscarriage.  Based on her LMP, she would be about 6 weeks 2 days today.  I discussed that with the bleeding she has had, and the tissue she has apparently  passed, this is most likely a miscarriage.  We will monitor her with serial beta hCGs as needed.  She got the first one done today.  We also discussed possibility of doing an ultrasound.  She would like to do this.  Order placed today.  We will follow-up with her closely with results.  I discussed general course of miscarriage with patient.  Discussed that if she starts having too much bleeding, she should go into the emergency room.  Of note, I asked her if she would like to take the tissue home with her and she declined this.  It was disposed at our office.    This dictation uses voice recognition software, which may contain typographical errors.

## 2021-06-02 NOTE — TELEPHONE ENCOUNTER
New Appointment Needed  What is the reason for the visit:    Same Date/Next Day Appt Request for Abnormal vaginal discharge, and pregnancy ocnfirmation  What is the reason for your visit?: Patient pregnancy with heavy vaginal discharge  Home pregnancy test was positive  First day of last menstrual 8/25    Provider Preference: PCP only  How soon do you need to be seen?: today or as soon as possible   Waitlist offered?: No  Okay to leave a detailed message:  Yes

## 2021-06-02 NOTE — PROGRESS NOTES
[facility] Office Visit    Chief Complaint:  Chief Complaint   Patient presents with     Follow-up     Flu Vaccine         Assessment/Plan:  1. SAB (spontaneous )  History c/w completed SAB and now with neg UPT.  Pt declines hgb check noting she didn't bleed too much and otherwise feels fine.    - Pregnancy (Beta-hCG, Qual), Urine    2. Need for influenza vaccination  - Influenza, Seasonal Quad, PF =/> 6months    3. Encounter for initial prescription of transdermal patch hormonal contraceptive device  Pt does not wish to be pregnant in near future.  Pt opts for Ortho Evra patch and is aware of increased risk for thromboembolic events on this form of birth control. Aware of other options available to her.  Not a smoker.  No personal or family h/o DVT/PE known.  No h/o migraines.  Reviewed how to use patch correctly.  F/u in 3 months if any concerns about this form of birth control.    - norelgestromin-ethinyl estradiol (ORTHO EVRA) 150-35 mcg/24 hr; Place 1 patch on the skin every 7 days. For 3 weeks and one week off per month  Dispense: 9 patch; Refill: 4      Return in about 9 months (around 2020) for Annual physical.    Patient Education/AVS:  There are no Patient Instructions on file for this visit.    HPI:   Shaila Savage is a 34 y.o. female c/o f/u on threatened Sab.  Pt was seen last week with new positive UPT and bleeding at home.  Had positive UPT at clinic and low bhcg 55- pt may have been around 6 weeks.  Ultrasound was early pregnancy.   Clear red bleeding started around 10/8/19 and LMP prior to this was 19. Had a baby 19 and bled for about a month afterwards and a couple days of spotting end of July for first period.     Bled for 7 days and confident that she had a miscarriage.  Used pads but only had to change 2x/day.  Hoping to get on birth control today if possible because she doesn't want to be pregnant again.  No personal or family h/o thromboembolic events.   "Not a smoker.  Weight as noted below.  Aware of risks and symptoms of thromboembolic issues.      ROS:  Constitutional, CV, Resp, GI, , MSK, skin, neuro, psych all negative except as outlined in the HPI above and patient denies any other symptoms.      History summarized from1-2:note from Magaly CHAMPION 10/8/19 outlining bleeding in early pregnancy.    Radiology tests reviewed-1: pelvic u/s showing no evidence for pregnancy.    Lab tests reviewed-1: post preg test and bhcg 55    Physical Exam:  BP 96/66 (Patient Site: Right Arm, Patient Position: Sitting, Cuff Size: Adult Regular)   Pulse 72   Ht 4' 9.75\" (1.467 m)   Wt 127 lb (57.6 kg)   BMI 26.77 kg/m   Body mass index is 26.77 kg/m . No LMP recorded.  Vital signs reviewed  Wt Readings from Last 3 Encounters:   10/17/19 127 lb (57.6 kg)   10/08/19 128 lb 4 oz (58.2 kg)   08/06/19 125 lb (56.7 kg)     Social History     Tobacco Use   Smoking Status Never Smoker   Smokeless Tobacco Never Used   Tobacco Comment    no exposure     Social History     Substance and Sexual Activity   Sexual Activity Yes     Partners: Male     Birth control/protection: Patch     No data recorded  No data recorded  PHQ-2 Total Score: 0 (10/8/2019 10:25 AM)    No data recorded    All normal as below except abnormalities include: grossly normal exam today.    General is a  34 y.o. female sitting comfortably in no apparent distress.   HEENT:  ye, nasal, oral exams within normal   Neck: Supple without lymphadenopathy or thyromegally  CV: Regular rate and rhythm S1S2 without rubs, murmurs or gallops,   Lungs: Clear to auscultation bilaterally  Abd:  +BS, soft NT/ND,  No masses or organomegally  Extremities: Warm, No Edema, 2+ Pedal and radial pulses bilaterally  Skin: No lesions or rashes noted  Neuro/MSK: Able to ambulate around the exam room with equal movement, strength and normal coordination of the upper and lower extremeties symmetrically    Results for orders placed or performed in " visit on 10/17/19   Pregnancy (Beta-hCG, Qual), Urine   Result Value Ref Range    Pregnancy Test, Urine Negative Negative       Med list and active problem list reviewed and updated as part of this encounter    Current Outpatient Medications on File Prior to Visit   Medication Sig Dispense Refill     MAPAP EXTRA STRENGTH 500 mg tablet Take 1 tablet (500 mg total) by mouth as needed for pain. 30 tablet 3     No current facility-administered medications on file prior to visit.          Selene Rae MD

## 2021-06-03 ENCOUNTER — COMMUNICATION - HEALTHEAST (OUTPATIENT)
Dept: NURSING | Facility: CLINIC | Age: 36
End: 2021-06-03

## 2021-06-03 VITALS
HEART RATE: 76 BPM | BODY MASS INDEX: 32.1 KG/M2 | DIASTOLIC BLOOD PRESSURE: 68 MMHG | RESPIRATION RATE: 14 BRPM | WEIGHT: 128.25 LBS | SYSTOLIC BLOOD PRESSURE: 110 MMHG

## 2021-06-03 VITALS — BODY MASS INDEX: 29.47 KG/M2 | WEIGHT: 141 LBS

## 2021-06-03 VITALS
DIASTOLIC BLOOD PRESSURE: 66 MMHG | SYSTOLIC BLOOD PRESSURE: 96 MMHG | WEIGHT: 127 LBS | BODY MASS INDEX: 26.66 KG/M2 | HEART RATE: 72 BPM | HEIGHT: 58 IN

## 2021-06-03 VITALS — BODY MASS INDEX: 31.29 KG/M2 | WEIGHT: 125 LBS

## 2021-06-03 VITALS — BODY MASS INDEX: 30.1 KG/M2 | WEIGHT: 144 LBS

## 2021-06-03 VITALS — WEIGHT: 127 LBS | BODY MASS INDEX: 31.79 KG/M2

## 2021-06-03 VITALS — BODY MASS INDEX: 28.84 KG/M2 | WEIGHT: 138 LBS

## 2021-06-03 VITALS — BODY MASS INDEX: 30.72 KG/M2 | WEIGHT: 147 LBS

## 2021-06-03 NOTE — PROGRESS NOTES
ID #: [id number] Agency: Language Line    The CHW called the patient to ask if there were any new concerns that she may have had. The patient stated that she was worried about the medical bill that she received. The CHW looked up the patient's insurance and she has been active since 9/1/2018. The CHW then stated that she would try to talk to the billing and coding department to see if they would be able to tell her what had happened. The CHW then talked to the  at [facility] and they were not able to find out why the patient was receiving a $168 bill for lab work. The CHW then sent a referral for the FRW to assist with this due to the patient having insurance since 2018.    Upcoming Appointments:   None    Next Outreach: 12/10/19

## 2021-06-03 NOTE — PROGRESS NOTES
: Adrian ID #: [id number] Agency: Language Line    Scheduled Follow Up Call:   Attempt 1 Community Health Worker called and left a message for the patient. If the patient is returning my call, please transfer the patient to Brandon Martinez at ext. [phone].    Next Outreach: 11/14/19

## 2021-06-03 NOTE — TELEPHONE ENCOUNTER
----- Message from May Rae MD sent at 11/15/2019 12:50 PM CST -----  Please call pharmacy- pt here today indicating that the OrthoEvra patch isn't covered by her insurance.  There should be a generic equivalent- okay to use this instead.    may

## 2021-06-03 NOTE — PROGRESS NOTES
Programs Applying For: [facility]: [facility] #: [id number]  PMI #: [id number]  Tracking: August 2020 for September 2020      Outreach:   11/15/19: FRW called billing dept. Current balance was processed. Pt has no balance at this time. FRW left pt a detailed VM with billing dept phone number in case she receives any future bills. No further needs from FRW.     Health Insurance Information:   FF: [facility].  Elig Type M2: [facility] group II  Eligibility Begin Date: 09/01/2019  Eligibility End Date: --/--/----    FF01 - [employer] [facility] through [facility]. The phone numbers are: [phone]    Referral:   This patient received a bill from having some lab work completed. The bill comes out to about $168. The bill is in the mailbox in the CHW office. Looks like the patient did not have insurance during the 8/6/19 visit, but the office visit was covered by the insurance that was lapsed due to the pregnancy. The patient is needing assistance with the $168 bill.

## 2021-06-03 NOTE — TELEPHONE ENCOUNTER
Called pharmacy and stated that they did dispense the generic equivalent that is covered by her insurance and she shouldn't be worried about a prior authorization

## 2021-06-04 NOTE — PROGRESS NOTES
My Clinic Care Coordination Wellness Plan  This Graduation Wellness Plan provides private information in regard to the work I have done with my Care Team at my Primary Care Clinic.  This document provides insight on the goals I have accomplished.  My Care Team congratulates me on my journey to maintain wellness.  This document will help guide me on my journey to maintain a healthy lifestyle.  I will use this to help me overcome any barriers I may encounter.  If I should have any questions or concerns, I will contact the members of my Care Team or contact my Primary Care Clinic for assistance.     [address]  [id number] [phone]    My Preferred Method of Contact:  Phone: [phone]    My Primary/Preferred Language:  Hmong    Preferred Learning Style:  Face to face discussion, Pictures/Diagrams and Hands on teaching    Emergency Contact: Extended Emergency Contact Information  Primary Emergency Contact: Tiffany Barrow  Address: [address] [place] [address] [place]  Mobile Phone: [phone]  Relation: Spouse  Secondary Emergency Contact: DECLINED, PER PT   [place]  Relation: Declined     PCP:  Selene Rae MD  Specialists:    Care Team            Selene Rae MD   [phone] PCP - General, Family Medicine    Selene Rae MD   [phone] Assigned PCP          Accomplishments:  Goals        Patient Stated      COMPLETED: My baby has medical insurance. (pt-stated)      Personal Plan  I will make sure that I update the county with any changes in address or in my financial situation.            Advanced Directive/Living Will: The patient was given information regarding Adanced Directives/Living Will     Emergency Plan Recommendation:     When to Use the Emergency Department (ED)  An emergency means you could die if you don t get care quickly. Or you could be hurt permanently (disabled). Read  below to know when to use -- and when not to use -- an emergency department (also called ED).     Dangers to your life  Here are examples of emergencies. These need immediate care:  A hard time breathing  Severe chest pain  Choking  Severe bleeding  Suddenly not able to move or speak  Blacking out (fainting)`  Poisoning     Dangers of permanent injuries  Here are other emergencies. These also need immediate care:  Deep cuts or severe burns  Broken bones, or sudden severe pain and swelling in a joint     When it s an emergency  If you have an emergency, follow these steps:     1. Go to the nearest emergency department  If you can, go to the hospital ED closest to you right away.  If you cannot get there right away, or if it is not safe to take yourself, call 911 or your police emergency number.  2. Call your primary care doctor  Tell your doctor about the emergency. Call within 24 hours of going to the ED.  If you cannot call, have someone call for you.  Go to your doctor (not the ED) for any follow-up care.     When it s not an emergency  If a problem is not an emergency, follow these steps:     1. Call your primary care doctor  If you don t know the name of your doctor, call your health plan.  If you cannot call, have someone call for you.  2. Follow instructions  Your doctor will tell you what you should do.  You may be told to see your doctor right away. You may be told to go to the ED. Or you may be told to go to an urgent care center.  Follow your doctor s advice.    Clinical Emergency Plan    All [facility] clinic patients have access to a Nurse 24 hours a day, 7 days a week.  If you have questions or want advice from a Nurse, please know [facility] is here for you.  You can call your clinic and they will connect you or you can call [facility] at [phone].  [facility] also has Walk In Care clinics in multiple locations.  Call the number listed above for more information about our Walk In Care clinics  or visit the [employer] website at [email or web address].

## 2021-06-04 NOTE — PROGRESS NOTES
: Adrian ID #: [id number] Agency: Language Line    The CHW called the patient to discuss her goals. The patient stated that there were concerns regarding a prescription for her child due to insurance not covering it. The CHW then was able to discuss how she can use an phylicia from [facility] to help with the cost of some medications that are not covered by insurance. The patient understood. The CHW then talked about Graduation from [facility] and the patient agreed.     Encounter routed to the RN to complete a chart review for Graduation.     Upcoming Appointments:   None    Next Outreach: 12/30/19

## 2021-06-04 NOTE — PROGRESS NOTES
: Chelsey ID #: [id number] Agency: Language Line    Scheduled Follow Up Call:   Attempt 1 Community Health Worker called and left a message for the patient. If the patient is returning my call, please transfer the patient to Brandon Martinez at ext. [phone].    Upcoming Appointments:   None    Next Outreach: 12/16/19

## 2021-06-04 NOTE — PROGRESS NOTES
Clinic Care Coordination Contact    Assessment: Care Coordinator contacted patient for 2 month follow up.  Patient has continued to follow the plan of care and assessment is negative for any new needs or concerns.    Enrollment status: Graduated.      Plan: No further outreaches at this time.  Patient will continue to follow the plan of care.  If new needs arise a new Care Coordination referral may be placed.  FYI to PCP  Emergency Plan Recommendation:    When to Use the Emergency Department (ED)  An emergency means you could die if you don t get care quickly. Or you could be hurt permanently (disabled). Read below to know when to use -- and when not to use -- an emergency department (also called ED).    Dangers to your life  Here are examples of emergencies. These need immediate care:  A hard time breathing  Severe chest pain  Choking  Severe bleeding  Suddenly not able to move or speak  Blacking out (fainting)`  Poisoning    Dangers of permanent injuries  Here are other emergencies. These also need immediate care:  Deep cuts or severe burns  Broken bones, or sudden severe pain and swelling in a joint    When it s an emergency  If you have an emergency, follow these steps:    1. Go to the nearest emergency department  If you can, go to the hospital ED closest to you right away.  If you cannot get there right away, or if it is not safe to take yourself, call 911 or your police emergency number.  2. Call your primary care doctor  Tell your doctor about the emergency. Call within 24 hours of going to the ED.  If you cannot call, have someone call for you.  Go to your doctor (not the ED) for any follow-up care.    When it s not an emergency  If a problem is not an emergency, follow these steps:    1. Call your primary care doctor  If you don t know the name of your doctor, call your health plan.  If you cannot call, have someone call for you.  2. Follow instructions  Your doctor will tell you what you should do.  You  may be told to see your doctor right away. You may be told to go to the ED. Or you may be told to go to an urgent care center.  Follow your doctor s advice.

## 2021-06-05 VITALS
WEIGHT: 125 LBS | SYSTOLIC BLOOD PRESSURE: 123 MMHG | DIASTOLIC BLOOD PRESSURE: 74 MMHG | BODY MASS INDEX: 26.35 KG/M2 | HEART RATE: 75 BPM

## 2021-06-05 VITALS
SYSTOLIC BLOOD PRESSURE: 116 MMHG | WEIGHT: 128 LBS | DIASTOLIC BLOOD PRESSURE: 72 MMHG | BODY MASS INDEX: 32.04 KG/M2 | RESPIRATION RATE: 16 BRPM | HEART RATE: 60 BPM

## 2021-06-05 VITALS
BODY MASS INDEX: 26.66 KG/M2 | HEIGHT: 58 IN | SYSTOLIC BLOOD PRESSURE: 104 MMHG | WEIGHT: 127 LBS | DIASTOLIC BLOOD PRESSURE: 68 MMHG | HEART RATE: 82 BPM

## 2021-06-05 VITALS — WEIGHT: 131 LBS | HEIGHT: 55 IN | BODY MASS INDEX: 30.32 KG/M2

## 2021-06-05 NOTE — PROGRESS NOTES
"The patient left the CHW a voicemail stating \"I need your help please\".   The CHW called the patient but was not able to reach her.   "

## 2021-06-10 ENCOUNTER — COMMUNICATION - HEALTHEAST (OUTPATIENT)
Dept: CARE COORDINATION | Facility: CLINIC | Age: 36
End: 2021-06-10

## 2021-06-11 NOTE — PATIENT INSTRUCTIONS - HE
Pregnancy: 7 weeks    Due Date: May 15, 2021    Next visit in 6 weeks  With Dr. Rae  For Your First OB Visit

## 2021-06-11 NOTE — TELEPHONE ENCOUNTER
New Appointment Needed  What is the reason for the visit:    Pregnancy Confirmation Appt Needed  When was the first day of your last menstrual cycle?: 8.8.2020  Have you done a home pregnancy test?: Yes: positive.    Provider Preference: PCP only  How soon do you need to be seen?: when appropriate  Waitlist offered?: No  Okay to leave a detailed message:  Yes

## 2021-06-12 NOTE — PROGRESS NOTES
Clinic Care Coordination Contact  [facility]/Voicemail    : Aarti Agency: [facility]    Clinical Data: Care Coordinator Outreach  Outreach attempted x 1.  Left message on patient's voicemail with call back information and requested return call.  Plan: Care Coordinator will try to reach patient again in 1-2 business days.    Next Outreach: 10/29/20

## 2021-06-12 NOTE — ANESTHESIA PREPROCEDURE EVALUATION
Anesthesia Evaluation      Patient summary reviewed     Airway   Mallampati: II  Neck ROM: full   Pulmonary - negative ROS and normal exam                          Cardiovascular - negative ROS and normal exam   Neuro/Psych - negative ROS     Endo/Other    (+) pregnant     GI/Hepatic/Renal - negative ROS           Dental - normal exam                        Anesthesia Plan  Planned anesthetic: MAC    ASA 1   Induction: intravenous   Anesthetic plan and risks discussed with: patient    Post-op plan: routine recovery

## 2021-06-12 NOTE — ANESTHESIA CARE TRANSFER NOTE
Last vitals:   Vitals:    10/26/20 2215   BP: 106/66   Pulse: 88   Resp: 19   Temp: 36.7  C (98.1  F)   SpO2: 100%     Patient's level of consciousness is drowsy  Spontaneous respirations: yes  Maintains airway independently: yes  Dentition unchanged: yes  Oropharynx: oropharynx clear of all foreign objects    QCDR Measures:  ASA# 20 - Surgical Safety Checklist: WHO surgical safety checklist completed prior to induction    PQRS# 430 - Adult PONV Prevention: 4558F - Pt received => 2 anti-emetic agents (different classes) preop & intraop  ASA# 8 - Peds PONV Prevention: NA - Not pediatric patient, not GA or 2 or more risk factors NOT present  PQRS# 424 - Marie-op Temp Management: 4559F - At least one body temp DOCUMENTED => 35.5C or 95.9F within required timeframe  PQRS# 426 - PACU Transfer Protocol: - Transfer of care checklist used  ASA# 14 - Acute Post-op Pain: ASA14B - Patient did NOT experience pain >= 7 out of 10

## 2021-06-12 NOTE — PROGRESS NOTES
Chief Complaint:  Chief Complaint   Patient presents with     Pregnancy Confirmation     pregnancy #2, LMP: 3/6/17     HPI:   Shaila Savage is a 31 y.o. female is here for pregnancy intake.  She is .  Patient's last menstrual period was 2017 (exact date).  [place]  No care for this pregnancy so far.    Past medical history: reviewed and updated.  No past medical history on file.  No past surgical history on file.    Current Outpatient Prescriptions:      prenatal vit-iron fum-folic ac (PRENATAL VITAMIN) 27 mg iron- 0.8 mg Tab, Take 1 tablet by mouth once daily., Disp: 100 tablet, Rfl: 3    Social:  Social History   Substance Use Topics     Smoking status: Never Smoker     Smokeless tobacco: Never Used     Alcohol use No       OBJECTIVE:  No Known Allergies  Vitals:    17 0831   BP: 90/54   Pulse: 72   Resp: 20     Body mass index is 22.99 kg/(m^2).    Vital signs stable as recorded above  General: Patient is alert and oriented x 3, in no apparent distress  Fetal Exam: Fundal height was palpable at 21 cm, fetal heart tones are heard at 145 bpm.  Patient reports positive fetal movement.    Results:  Results for orders placed or performed in visit on 17   Culture, Urine   Result Value Ref Range    Culture No Growth    Pregnancy (Beta-hCG, Qual), Urine   Result Value Ref Range    Pregnancy Test, Urine Positive (!) Negative   Urinalysis, OB Screen   Result Value Ref Range    Glucose, UA Negative Negative    Ketones, UA Negative Negative    Protein, UA Negative Negative mg/dL   Hepatitis B Surface antigen (HBsAG)   Result Value Ref Range    Hepatitis B Surface Ag Negative Negative   HIV Antigen/Antibody Screening Cascade   Result Value Ref Range    HIV Antigen / Antibody Negative Negative   HML Antibody Screen   Result Value Ref Range    HML Antibody Screen Negative Negative   RPR   Result Value Ref Range    Syphilis Screen Cascade Non-Reactive Non-Reactive   Drugs of  Abuse 1,Urine   Result Value Ref Range    Amphetamines Screen Negative Screen Negative    Benzodiazepines Screen Negative Screen Negative    Opiates Screen Negative Screen Negative    Phencyclidine Screen Negative Screen Negative    THC Screen Negative Screen Negative    Barbiturates Screen Negative Screen Negative    Cocaine Metabolite Screen Negative Screen Negative    Oxycodone Screen Negative Screen Negative    Creatinine, Urine 167.1 mg/dL   HM1 (CBC with Diff)   Result Value Ref Range    WBC 11.5 (H) 4.0 - 11.0 thou/uL    RBC 4.45 3.80 - 5.40 mill/uL    Hemoglobin 12.0 12.0 - 16.0 g/dL    Hematocrit 37.7 35.0 - 47.0 %    MCV 85 80 - 100 fL    MCH 27.0 27.0 - 34.0 pg    MCHC 31.8 (L) 32.0 - 36.0 g/dL    RDW 14.6 (H) 11.0 - 14.5 %    Platelets 254 140 - 440 thou/uL    MPV 10.1 8.5 - 12.5 fL    Neutrophils % 69 50 - 70 %    Lymphocytes % 19 (L) 20 - 40 %    Monocytes % 6 2 - 10 %    Eosinophils % 6 0 - 6 %    Basophils % 1 0 - 2 %    Neutrophils Absolute 7.7 2.0 - 7.7 thou/uL    Lymphocytes Absolute 2.2 0.8 - 4.4 thou/uL    Monocytes Absolute 0.6 0.0 - 0.9 thou/uL    Eosinophils Absolute 0.7 (H) 0.0 - 0.4 thou/uL    Basophils Absolute 0.1 0.0 - 0.2 thou/uL     Other screening pregnancy lab work is ordered and pending.    Patient scored a 0/30 on [place]  Depression Screen.    Assessment and Plan:  1. Pregnancy Intake Appointment.  Shaila Savage is 31 y.o. and .  Patient's last menstrual period was 2017 (exact date).  Estimated Date of Delivery: 17  She will be seeing Dr. Rae for OB care.  Screening pregnancy lab work was drawn.  Prenatal vitamins prescribed.  Problem list and current medications reviewed and updated.  Dating and anatomic survey ultrasound ordered.  Quad screen offered and ordered.  Prenatal info packet given.    Follow up in 3 weeks.  Please see OB Episode for complete details.  Visit was approximately 30 minutes, greater than 50% of time spent in face-to-face  counseling and coordination of care.    This dictation uses voice recognition software, which may contain typographical errors.

## 2021-06-12 NOTE — PROGRESS NOTES
Clinic Care Coordination Contact  Community Health Worker Initial Outreach       Patient accepts CC: No, due to having no additional concerns regarding her hospital stay at this time. The CHW reminded the patient about her upcoming appointment with her PCP on 11/13/20. At this time the CHW will do no further outreaches.

## 2021-06-12 NOTE — PROGRESS NOTES
"Chief Complaint:  Chief Complaint   Patient presents with     Initial Prenatal Visit     LMP 2020 approx.      Loss of Consciousness     Please discuss further with patient to see if Rx is appropriate     HPI:   Shaila Savage is a 34 y.o. female is here for pregnancy intake.  She is .  Patient's last menstrual period was 2020 (approximate).  Some nausea and vomiting, tolerable.  Very tired, discussed lifestyle modifications with her.  Positive home pregnancy test on 20.  The test faded away in 1 week, so she was wondering if it was really positive or not.  Was using birth control patch.  Was not using it regularly due to insurance issues and some itchy skin.  Having some lightheadedness and black vision when going from sitting to standing, sounds like orthostatic hypotension.  Discussed lifestyle modifications.  No syncope.  Possibly had some \"baby blues\" after her 2 births.   Also having some vaginal discharge, not sure if it is normal.  Wants to do self-collect swab.    Past medical history: reviewed and updated.  Past Medical History:   Diagnosis Date     Group B streptococcal carriage complicating pregnancy 2017     High risk HPV infection 2018     Normal delivery 2017     Normal delivery 2017     Normal pregnancy 10/12/2018     History reviewed. No pertinent surgical history.    Current Outpatient Medications:      prenat.vits,kacie,min-iron-folic Tab, Take 1 tablet by mouth daily., Disp: 100 each, Rfl: 3    Social:  Social History     Tobacco Use     Smoking status: Never Smoker     Smokeless tobacco: Never Used     Tobacco comment: no exposure   Substance Use Topics     Alcohol use: No     Drug use: No       OBJECTIVE:  No Known Allergies  Vitals:    20 0840   BP: 123/74   Pulse: 75     Body mass index is 26.35 kg/m .    Vital signs stable as recorded above  General: Patient is alert and oriented x 3, in no apparent distress  Remainder of physical exam deferred to " patient's First OB Visit.    Results:  Results for orders placed or performed in visit on 09/30/20   Culture, Urine    Specimen: Urine, Clean Catch   Result Value Ref Range    Culture No Growth    Chlamydia trachomatis & Neisseria gonorrhoeae, Amplified Detection    Specimen: Urine, Voided; Body Fluid   Result Value Ref Range    Chlamydia trachomatis, Amplified Detection Negative Negative    Neisseria gonorrhoeae, Amplified Detection Negative Negative   Wet Prep, Vaginal    Specimen: Genital   Result Value Ref Range    Yeast Result No yeast seen No yeast seen    Trichomonas No Trichomonas seen No Trichomonas seen    Clue Cells, Wet Prep No Clue cells seen No Clue cells seen   Pregnancy (Beta-hCG, Qual), Urine   Result Value Ref Range    Pregnancy Test, Urine Positive (!) Negative   ABO/RH Typing (OP order)   Result Value Ref Range    HML ABO/Rh Typing O POS     HML ABO/Rh Repeat Typing O POS    Hepatitis B Surface antigen (HBsAG)   Result Value Ref Range    Hepatitis B Surface Ag Negative Negative   HIV Antigen/Antibody Screening Cascade   Result Value Ref Range    HIV Antigen / Antibody Negative Negative   HML Antibody Screen   Result Value Ref Range    HML Antibody Screen Negative    Lead, Blood   Result Value Ref Range    Lead      Collection Method Venous    RPR   Result Value Ref Range    Treponema Antibody (Syphilis) Negative Negative   Rubella Immune Status (IgG)   Result Value Ref Range    Rubella Antibody, IgG Positive    Drugs of Abuse 1,Urine   Result Value Ref Range    Amphetamines Screen Negative Screen Negative    Benzodiazepines Screen Negative Screen Negative    Opiates Screen Negative Screen Negative    Phencyclidine Screen Negative Screen Negative    THC Screen Negative Screen Negative    Barbiturates Screen Negative Screen Negative    Cocaine Metabolite Screen Negative Screen Negative    Oxycodone Screen Negative Screen Negative    Creatinine, Urine 22.9 mg/dL   Urinalysis, OB Screen   Result  Value Ref Range    Glucose, UA Negative Negative    Ketones, UA Negative Negative    Protein, UA Negative Negative mg/dL   HM1 (CBC with Diff)   Result Value Ref Range    WBC 8.5 4.0 - 11.0 thou/uL    RBC 5.24 3.80 - 5.40 mill/uL    Hemoglobin 13.9 12.0 - 16.0 g/dL    Hematocrit 43.8 35.0 - 47.0 %    MCV 84 80 - 100 fL    MCH 26.5 (L) 27.0 - 34.0 pg    MCHC 31.7 (L) 32.0 - 36.0 g/dL    RDW 12.9 11.0 - 14.5 %    Platelets 340 140 - 440 thou/uL    MPV 9.9 8.5 - 12.5 fL    Neutrophils % 58 50 - 70 %    Lymphocytes % 27 20 - 40 %    Monocytes % 7 2 - 10 %    Eosinophils % 6 0 - 6 %    Basophils % 2 0 - 2 %    Immature Granulocyte % 0 <=0 %    Neutrophils Absolute 5.0 2.0 - 7.7 thou/uL    Lymphocytes Absolute 2.3 0.8 - 4.4 thou/uL    Monocytes Absolute 0.6 0.0 - 0.9 thou/uL    Eosinophils Absolute 0.5 (H) 0.0 - 0.4 thou/uL    Basophils Absolute 0.1 0.0 - 0.2 thou/uL    Immature Granulocyte Absolute 0.0 <=0.0 thou/uL   Lead, Blood, Venous   Result Value Ref Range    Lead, Blood (Venous) <2.0 <=4.9 ug/dL     Other screening pregnancy lab work is ordered and pending.    Assessment and Plan:  1. Pregnancy Intake Appointment.  Shaila Savage is 34 y.o. and .  Patient's last menstrual period was 2020 (approximate).  Estimated Date of Delivery: 5/15/21  She will be seeing Dr. Rae for OB care.  Screening pregnancy lab work was drawn.  Prenatal vitamins prescribed.  Problem list and current medications reviewed and updated.  Dating ultrasound ordered.  Prenatal info packet given.  First trimester genetic screening test was discussed with patient.    She would like the Nuchal translucency ultrasound.  Ordered.    2. Dizziness on standing  No acute concerns today, based on symptom discussion.  discussed lifestyle modifications to decrease probable orthostatic hypotension.  No syncope reported.  Monitor and follow-up if worsening or other concerns.    3. Vaginal discharge  Normal.  Monitor.  - Wet Prep, Vaginal    4.  Daughter born with microphthalmia right eye.    Follow up in 6 weeks.  Please see OB Episode for complete details.  Visit was 45 minutes, greater than 50% of time spent in face-to-face counseling and coordination of above care.    This dictation uses voice recognition software, which may contain typographical errors.

## 2021-06-12 NOTE — PROGRESS NOTES
Overall doing well.  No concerns.  2nd wife for - he has grown children.  Morning sickness is resolved.  No bleeding.  Applying for permanent resident.  No insurance at this point.

## 2021-06-12 NOTE — ANESTHESIA POSTPROCEDURE EVALUATION
Patient: Shaila Savage  Procedure(s):  DILATION AND CURETTAGE, UTERUS, USING SUCTION  Anesthesia type: MAC    Patient location: PACU  Last vitals:   Vitals Value Taken Time   /62 10/26/20 2220   Temp 36.7  C (98.1  F) 10/26/20 2215   Pulse 89 10/26/20 2227   Resp 20 10/26/20 2227   SpO2 100 % 10/26/20 2227   Vitals shown include unvalidated device data.  Post vital signs: stable  Level of consciousness: awake and responds to simple questions  Post-anesthesia pain: pain controlled  Post-anesthesia nausea and vomiting: no  Pulmonary: unassisted, return to baseline  Cardiovascular: stable and blood pressure at baseline  Hydration: adequate  Anesthetic events: no    QCDR Measures:  ASA# 11 - Marie-op Cardiac Arrest: ASA11B - Patient did NOT experience unanticipated cardiac arrest  ASA# 12 - Marie-op Mortality Rate: ASA12B - Patient did NOT die  ASA# 13 - PACU Re-Intubation Rate: ASA13B - Patient did NOT require a new airway mgmt  ASA# 10 - Composite Anes Safety: ASA10A - No serious adverse event    Additional Notes:

## 2021-06-13 NOTE — PROGRESS NOTES
Met with SW with her family and working on [employer] and b/c she doesn't have a SSN yet needs to complete everything by paper application and this will take longer.  Will give family the business contact for our SW so they can stay in touch with her over the phone as needed. Regular fetal movement.  No bleeding.  No LOF.  No Headaches.  Tired but sleeping well at night.  No swelling.  Got Flu shot today.  Abd tightening but not painful- no more than 1-2x/day.

## 2021-06-13 NOTE — PROGRESS NOTES
Found out that she does have medical insurance now - just needs her formal cards from the carrier- just has medicaid cards for now.  Interested in birthing classes.  Will put referral in for PHRN program and  will help research options with [facility].  Daily regular fetal movement.  Cramping with fetal movement only.  No bleeding or LOF.

## 2021-06-13 NOTE — PROGRESS NOTES
Doing well- mild headaches.  Worse and dizziness with standing longer than 10minutes.  Tired and fatigue.  No bleeding.  No LOF.  No swelling.  Regular daily fetal movement.  Sleeping well.  Needs a breast pump. Needs car seat yet- meeting with SW today for needed services/supplies.

## 2021-06-14 NOTE — PROGRESS NOTES
Overall doing well.  Starting to get some swelling in her hands and ankles.  No CTXs.  No bleeding or LOF.  Regular daily fetal movemnt.  Sx of CTS overnight- discussed sx management options.  No headaches.

## 2021-06-14 NOTE — PROGRESS NOTES
Shaila was here for appt with Dr. Rae. She is interested in birthing classes. [facility] [place]. SARATH requested the code so she could let Shaila know what classes are available. She is not eligible for a car seat through them as she is straight [place]. She has also not been receiving WIC, so an appointment was set up for tomorrow, they may be able to help her with a car seat. SARATH gave her a girl baby bundle today and she is coming back to complete safe sleep training.

## 2021-06-14 NOTE — PROGRESS NOTES
Starting to get low back pain otherwise doing well.  Hard to sleep at night due to back pain.  Hard to shift/move at night. Regular fetal movement.  No bleeding. No lof.  No CTXs.  Has everything but car seat ready for baby at this point.

## 2021-06-14 NOTE — PROGRESS NOTES
Overall doing well.  Discussed GBS positive status and need to go to hospital early in labor and plan to stay for 48hrs after delivery for monitoring.  Notes uncomfortable while sitting.  Regular fetal movement.  NO bleeding.  No LOF.  No swelling.  No headaches.

## 2021-06-15 NOTE — PROGRESS NOTES
First OB Visit with [facility] provider:     Assessment/Plan:  1. Abnormal menses  - Pregnancy (Beta-hCG, Qual), Urine    2. 8 weeks gestation of pregnancy  Starting prenatal care as outlined below.    - ABO/RH Typing (OP order)  - Hepatitis B Surface antigen (HBsAG)  - HIV Antigen/Antibody Screening Cascade  - HM1(CBC and Differential)  - HML Antibody Screen  - RPR  - Rubella Immune Status (IgG)  - Urinalysis Macroscopic  - Culture, Urine  - Chlamydia/gonorrhoeae, URINE  - Lead, Blood  - US OB < 14 Weeks With Transvaginal; Future  - prenat.vits,kacie,min-iron-folic Tab; Take 1 tablet by mouth daily.  Dispense: 100 each; Refill: 3  - Hepatitis C Antibody (Anti-HCV)  - Ambulatory referral to Care Management (Primary Care)  - Lead, Blood, Venous    3. Class 1 obesity due to excess calories without serious comorbidity with body mass index (BMI) of 32.0 to 32.9 in adult  Reviewed risks of prepregnancy weight in her pregnancy health.  Reviewed healthy diet and activity to keep her and baby healthy.      4. Microcytosis  Check for anemia and thalassemia.   - Hemoglobinopathy/Thalassemia Taney    Return in 4 weeks (on 3/12/2021) for prenatal care.    HPI:  Shaila Savage is a 35 y.o. female c/o pregnancy. LMP 2020.      Had miscarriage 10/2019 after  2019. Had another miscarriage 10/2020.  Had a period after her d&C Nov and December but no period since then.  Did UPT at home positive 21.  Has been feeling nauseated for past 3 weeks.  No vomiting.      CAMRYN 21 7 5/7 weeks    Taking PNV: not yet.        Labs/imaging reviewed  Discussed screening for aneuploidy and neural tube defects, SMA and CF.  will review with her  and discuss at next visit  Role of ultrasound in pregnancy discussed  Preeclampsia risk factors:    High risk factors (1+): None    Moderate risk factors (2+): socioeconomic risks (, low SES), obesity  Reviewed and updated CAMRYN, past medical history, past surgical history,  family history, genetic history, and previous obstetrical history.    Encouraged good nutrition, well balanced diet, regular activity.  Discussed foods to avoid.  And other applicable safety/health risks in pregnancy.    Reviewed OB intake consultation.     High Risk Behavior: Significantly overweight or underweight    ROS:  Constitutional, ENT, CV, Resp, GI, , MSK, skin, neuro, psych all negative except as outlined in the HPI above and patient denies any other symptoms.      EXAM:  All normal as below except abnormalities include: grossly normal exam.    General is a  35 y.o. female sitting comfortably in no apparent distress.   HEENT:  TM are clear bilaterally.  Eye, nasal, oral exams within normal   Neck: Supple without lymphadenopathy or thyromegally  CV: Regular rate and rhythm S1S2 without rubs, murmurs or gallops,   Lungs: Clear to auscultation bilaterally  Abd:  +BS, soft NT/ND,  No masses or organomegally,   Extremities: Warm, No Edema, 2+ Pedal and radial pulses bilaterally  Skin: No lesions or rashes noted  Neuro: Able to ambulate around the exam room with equal movement, strength and normal coordination of the upper and lower extremeties symmetrically  Pelvic:Not examined

## 2021-06-15 NOTE — PROGRESS NOTES
"Clinic Care Coordination Contact:  Community Health Worker Initial Outreach    Reason: Per CCC SW request (encounter 2/19/2021) regarding to \"[id number] - Ambulatory referral to Care Management (Primary Care).\"    Discussion: CHW was able to connect with the patient today regarding to reason above per SW request. Patient agreed to reschedule the initial assessment appt with CCC SW via phone visit. Pt is encouraged to contact CHW/CCC team with any questions. CHW phone number is provide. Pt confirmed no other questions.     Patient initial assessment (enrollment into CCC service) rescheduled to Friday, 2/26/2021 at 9:00AM with [facility] SW via phone visit appt.      Plan:   Message route to updates CCC SW.  "

## 2021-06-15 NOTE — PROGRESS NOTES
Clinic Care Coordination Contact  Care Team Conversations     CCC SW attempted to reach patient with an . Phone said it was not available for calls. Then called again and it rang with no voicemail.

## 2021-06-15 NOTE — PROGRESS NOTES
Clinic Care Coordination Contact:  Community Health Worker Initial Outreach    Reason: [id number] - Ambulatory referral to Care Management (Primary Care)  Note    New pregnancy and finances are tight        CHW Initial Information Gathering:  Referral Source: PCP  Preferred Hospital: [facility]  [phone]  Preferred Urgent Care: [facility], [phone]  Current living arrangement:: Not Assessed  Community Resources: Other (see comment)(Not ready to apply at this time for this pregnancy per patient reported.)  Informal Support system:: Family, Spouse  No PCP office visit in Past Year: No  Transportation means:: Family, Regular car  CHW Additional Questions  If ED/Hospital discharge, follow-up appointment scheduled as recommended?: N/A     Discussion (attempt #1): The clinic Community Health Worker talked with the patient today at the request of the PCP to discuss possible Clinic Care Coordination enrollment. The service was described to the patient and immediate needs were discussed. Verified financial resources need with patient with pt's spouse Tiffany present. CHW explained [facility] benefits-how pt will be contact by [facility] and the [facility] after application submit.     Patient reported:   -Current housing situation: Moved in December 2020 to live with my step-son and his wife/family. He is my  son.   -Reason moved in to live with my step-son and his family: I and spouse was unable to afford rent payment due to our monthly income.   -Monthly income: spouse employed full-time; work 40 hours a week. Hourly pay rate: $16.00. (NOTE: per pt and spouse reported).   -Family size: 2 child and 2 adults (I and spouse).  -Currently pregnant with baby #3 on the way. Not interested to apply for WIC for this pregnancy. Will connect with the WIC office after ultra-sound appt.   -Never apply public housing in the past.   -No interested to apply for any benefits through the [facility] at this  time per spouse.      Patient accepts CC: Yes. Patient scheduled for assessment with [facility]   on Friday, 2- at 9:00AM. Patient noted desire to discuss housing.

## 2021-06-15 NOTE — PROGRESS NOTES
Clinic Care Coordination Contact  Care Team Conversations     CCC SARATH contacted Shaila with an  to follow up about finishing the PHA application. Her  wanted to clarify why we needed the social security number before providing it.    SARATH explained to Shaila any PHA applications require everyone in the household to provide social security number. She will talk to him and SARATH will call back on Monday.

## 2021-06-15 NOTE — PATIENT INSTRUCTIONS - HE
Patient Education   HEALTHY PREGNANCY CARE: 6 to 10 WEEKS PREGNANT    Pregnancy is an important time for you to take care of yourself and your baby. There is much that you can do through simple things like nutrition and exercise that will help you achieve the best outcome possible.     Learn about the changes you and your baby will experience during pregnancy. Your baby's facial features, brain, spinal cord and internal organs are developing, and baby's heart is pumping blood. Due to hormonal changes, you may notice nausea, fatigue or breast tenderness.    Common Discomforts of Early Pregnancy  Your body goes through many changes during pregnancy. Some are noticeable like increased breast size or darkening of the color of the nipple, but some changes may cause discomfort like breast tenderness, urinary frequency, fatigue or nausea. If you have questions about the duration or severity of what you are experiencing, contact your midwife or physician for guidance.     Coping with nausea/morning sickness    Sip small amounts of water, juices, or shakes. Try drinking between meals, not with meals.     Eat 5 or 6 small meals a day. Try dry toast, crackers, or cereal when you first get up, and eat breakfast a little later.    Make blanca tea (sliced blanca root in hot water with honey). Sip a cup in the morning before getting up.    Avoid spicy, greasy, and fatty foods.     When you feel sick, open your windows or go for a short walk to get fresh air.     Try nausea wristbands. These help some women.     Call your midwife or physician if you cannot keep fluids down, or if vomiting persists. There are medications that can help.    Choose healthy foods and gain the recommended amount of weight for your size. If you have questions or follow a special diet, talk with your midwife or physician. You should take one prenatal vitamin daily.  If nausea is a problem, try taking only a folic acid supplement of 400-800mcg daily until  the nausea passes.    Follow safe guidelines for exercise. Low impact aerobic activities are generally okay during pregnancy. If you have a regular exercise routine, you should be able to continue it during pregnancy as long as it doesn't cause pain. Talk to your midwife or physician about your activity at your prenatal visits.    You can sign up for a weekly parenting e-mail that gives support, tips and advice from health care professionals that starts with pregnancy and continues through the toddler years. To register, go to [email or web address] at any time during your pregnancy.    Things to Avoid During Pregnancy  A general principal to follow during pregnancy is to stay away from anything that is strong/bad smelling (gas, paint, fumes, etc), or known to cause problems for mom or baby    Smoking (self or others)    Alcohol     Pesticides    Caffeine     Soft cheeses    Fish with high mercury content (such as shark, swordfish, brock mackerel, or tilefish)    Some over the counter meds (ask your midwife or physician before taking)    Changing the hector litter box    This is also a good time to think about genetic screening tests. These are tests done during pregnancy to look for possible problems with the baby. First trimester tests for Down's Syndrome, Trisomy 13 and 18 can be done as early as 10 weeks of pregnancy. Some testing can be done as late as 22 weeks of pregnancy, depending on the test. There are other tests that look for spinal defects, cystic fibrosis, Lan-Sachs disease. Talk with your midwife or physician about testing.    Warning Signs    Watch for warning signs, such as     vaginal bleeding    fluid leaking from your vagina    severe abdominal pain    nausea and vomiting more than 4-5 times a day, or if you are unable to keep anything down    fever more than 100.4 degrees F.     Contact your midwife or physician at [facility] at Phone: [phone] if you have these or  any other concerns. If it's after clinic hours, physician patients should call the [facility] at [phone] ([phone]); midwife patients should call their answering service at [phone].    How can you care for yourself at home?   You can refer to the Starting Out Right book or find it online at [email or web address]/[email or web address]/[email or web address]-[email or web address]-Right.pdf or [email or web address]/images/[email or web address]/flipbooks/[email or web address]-starting-out-right/[facility]-starting-out-right.html#p=8

## 2021-06-15 NOTE — PROGRESS NOTES
Clinic Care Coordination Contact    Follow Up Progress Note      Assessment: [facility] SARATH contacted Shaila with an  to complete the [place] [facility] application.    We were able to complete the application.    Status: Submitted  Reference #: [id number]  March 1, 2021 3:22PM    Goals addressed this encounter:   Goals Addressed                 This Visit's Progress      Psychosocial (pt-stated)        Goal Statement: I want to apply for [place] [facility] wait list within one month  Date Goal set: 02/26/21  Barriers:   Strengths:   Date to Achieve By: 3/30/2021  Patient expressed understanding of goal: Yes  Action steps to achieve this goal:  1. I have completed my application with [facility] SW for [facility] [place] [facility], I will let CHW know at next outreach if I have received any mail from [facility].                 Intervention/Education provided during outreach: See above     Outreach Frequency: monthly    Plan:   Standard Outreach

## 2021-06-16 PROBLEM — E66.09 CLASS 1 OBESITY DUE TO EXCESS CALORIES WITHOUT SERIOUS COMORBIDITY WITH BODY MASS INDEX (BMI) OF 32.0 TO 32.9 IN ADULT: Status: ACTIVE | Noted: 2021-02-12

## 2021-06-16 PROBLEM — Z34.90 SUPERVISION OF NORMAL PREGNANCY: Status: ACTIVE | Noted: 2020-09-30

## 2021-06-16 PROBLEM — R71.8 MICROCYTOSIS: Status: ACTIVE | Noted: 2021-02-16

## 2021-06-16 PROBLEM — E66.811 CLASS 1 OBESITY DUE TO EXCESS CALORIES WITHOUT SERIOUS COMORBIDITY WITH BODY MASS INDEX (BMI) OF 32.0 TO 32.9 IN ADULT: Status: ACTIVE | Noted: 2021-02-12

## 2021-06-16 PROBLEM — D56.3 ALPHA THALASSEMIA MINOR TRAIT: Status: ACTIVE | Noted: 2021-02-18

## 2021-06-16 PROBLEM — L70.0 ACNE VULGARIS: Status: ACTIVE | Noted: 2019-01-11

## 2021-06-16 PROBLEM — N39.3 FEMALE STRESS INCONTINENCE: Status: ACTIVE | Noted: 2019-08-06

## 2021-06-16 NOTE — PROGRESS NOTES
Shaila Savage is a 35 y.o. female who is being evaluated via a billable telephone visit.      What phone number would you like to be contacted at? [phone]  How would you like to obtain your AVS? AVS Preference: Mail a copy.    [facility] PHONE Visit  Phone : luis    Chief Complaint:  Chief Complaint   Patient presents with     Routine Prenatal Visit     feels weak and feels like fainting if standing up for long time.       Assessment/Plan:  1. Encounter for supervision of other normal pregnancy in second trimester  Reviewed warning signs and health lifestyle.   - US OB > = 14 Weeks; Future    Return in 4 weeks (on 2021) for prenatal care.      HPI:   Shaila Savage is a 35 y.o. female and presents to clinic today for the following health issues 17week .      Pt notes that she is starting to feel tired and drained and may fall if she stands for a couple minutes to cook, etc.  Has been stable like this for past month.  Nausea and smell sensitivity is getting better.  Eating is better now - back to normal.  Drinking lots of water daily.  Was tired with previous pregnancies but not like this.  Continues to take iron and pnv.      H/o sharp pain in LLQ from her last miscarriage.  Notes that she has this still- better if she is busy and not focused.  Worse if she is sitting down resting.  Comes and goes.      Notes that if she eats something sweet- like a banana, cake, etc. She will get 1-2 days of a thick white/green vaginal mucous that self resolves    Her  did get covid vaccine.  Wants to get the J&J vaccine.      Reviewed alpha thal carrier status.     Social History     Social History Narrative     Not on file       Physical Exam:  Vitals from last visit reviewed.   Per pt report at home:      Patient's last menstrual period was 2020 (exact date).  Wt Readings from Last 3 Encounters:   21 127 lb (57.6 kg)   21 128 lb (58.1 kg)   10/26/20 131 lb  (59.4 kg)       No data recorded  No data recorded  PHQ-2 Total Score: 0 (9/30/2020  8:38 AM)    No data recorded    GENERAL: Patient sounds well and able to participate in history and planning without difficulty.     Phone call duration:  14 minutes    Selene Rae MD  [facility]

## 2021-06-16 NOTE — PROGRESS NOTES
Doing well.  Here with her 22mo son Honey.  Notes that she is getting more low back pain when she lifts something heavy.  Reviewed strategies.  No bleeding.  No cramping.  None of her kids have anemia.  Mild nausea with strong smells.  Taking PNV daily.  rx for iron daily.  Pt noting increased dry and cracked skin. No constipation.  Using lotion 2-3x/day.  Drinking 7-8 bottles of water daily.  in his 60s so far declining covid vaccine.

## 2021-06-16 NOTE — TELEPHONE ENCOUNTER
Please let pt know that the genetic testing on her baby was healthy and normal.     If she wants to know the gender of her baby: Boy.

## 2021-06-16 NOTE — TELEPHONE ENCOUNTER
Telephone Encounter by Anahi Vaughn at 1/25/2019  8:45 AM     Author: Anahi Vaughn Service: -- Author Type: --    Filed: 1/25/2019  8:47 AM Encounter Date: 1/16/2019 Status: Signed    : Anahi Vaughn       Per insurance, PA is not required for brand name. Called insurance and left detailed message that insurance requires brand name and to run for that. Advised them that if they had any further issues, they will need to call the pharmacy help desk for further assistance.

## 2021-06-16 NOTE — TELEPHONE ENCOUNTER
Chel from [employer] called stating they found pt to have alpha thalassemia trait when they ran the [employer] test. She just wanted to call to let you know of this. She also wants to know if you have any questions for them. This is an FYI. No need to call back unless you have questions. Thank you

## 2021-06-16 NOTE — PATIENT INSTRUCTIONS - HE
"  Patient Education   HEALTHY PREGNANCY CARE: 14 to 18 WEEKS PREGNANT    During this time, you may start to \"show,\" so that you look pregnant to people around you. You may also notice some changes in your skin, such as an increase in acne on your face. You may notice your heart pounding, a sharp stretching ache on either side of your lower abdomen (round ligament), and more vaginal discharge.     Your baby's nerves and muscles are maturing. Sex organs are recognizable. Your baby is now able to pass urine, and your baby's first stool (meconium) is starting to collect in his or her intestines. Hair is also beginning to grow on your baby's head. Your baby is moving freely inside your uterus but you may not be able to feel it until 18-20 weeks.    Continue making healthy choices for your baby during pregnancy, including good nutrition, exercise and a safe environment free from smoking, alcohol and drugs.    Your genetic screening with a quad screen blood test may have been done today.    Watch for warning signs and contact your midwife or physician at the clinic with any concerns at [facility] at Phone: [phone]. For example, call about cramping, bleeding, abdominal pain, watery vaginal discharge, or if you are unable to keep fluids down for more than 24 hours due to vomiting.   If it's after clinic hours, physician patients should call the [facility] at [phone] ([phone]); midwife patients should call their answering service at [phone].    How can you care for yourself at home?   You can refer to the Starting Out Right book or find it online at http://www.[email or web address]/images/stories/[email or web address]-[email or web address]-Right.pdf or http://www.[facility].org/images/stories/flipbooks/[facility]-starting-out-right/[facility]-starting-out-right.html#p=8     You can sign up for a weekly parenting e-mail that gives support, tips and advice from health care professionals that starts " with pregnancy and continues through the toddler years. To register, go to [email or web address] at any time during your pregnancy.

## 2021-06-16 NOTE — PROGRESS NOTES
"Clinic Care Coordination Contact:    Community Health Worker Follow Up    Goals:   Goals Addressed                 This Visit's Progress      Psychosocial (pt-stated)   0%     Goal Statement: I want to apply for [place] [facility] wait list within one month.    Date Goal set: 02/26/21  Barriers:   Strengths:   Date to Achieve By: 3/30/2021  Patient expressed understanding of goal: Yes  Action steps to achieve this goal:  1. I have completed my application with [facility] SW for [place] [facility], I will let CHW know at next outreach if I have received any mail from [facility].  2. I will have my daughter assist with checking my mail to make sure not missing any mail from the [facility] or [facility]. I've received lots of mail and sitting here in my home.   3. I will drop off copy of mail from the [facility] or [facility] to CCC team to review if any.   4. I will updates CCC team on the status.     Note/FYI:   -Per CCC SW encounter dated 3/02/2021: \"We were able to complete the application.   Status: Submitted  Reference #: [id number]  March 1, 2021 3:22PM.\"    (Updated: 3-)              Discussion: CHW was able to connect with the patient today regarding to follow up. Goal updated. Encouraged pt to check her mail in case any from the [facility] agency or [facility] regarding to housing application. Suggested pt to contact CCC team for any additional resources need. Pt is reminded for tomorrow appt 3/26/2021 at 8:40AM with PCP in the C.     Patient reported that she is doing well and will discuss with pcp tomorrow for any health concerns. No other questions.     CHW Next Follow-up: goal follow up.     Outreach frequency: monthly    CHW Plan: 4-      "

## 2021-06-16 NOTE — PROGRESS NOTES
Clinic Care Coordination Contact    Situation: Patient chart reviewed by care coordinator.    Background: SW completed chart review    Assessment: Patient in contact with CHW and attended initial prenatal appointment    Plan/Recommendations: Standard Outreach

## 2021-06-16 NOTE — PATIENT INSTRUCTIONS - HE
Patient Education   HEALTHY PREGNANCY CARE: 10-14 WEEKS PREGNANT     By weeks 10 to 14 of your pregnancy, the placenta has formed inside your uterus. It may be possible to hear your baby's heartbeat with a doppler ultrasound device. Your baby's eyes can and do move. The arms and legs can bend.    The second trimester genetic screening tests for Down's Syndrome, Trisomy 18, and neural tube defects (which are known collectively as a quad screen) are done at 15 to 22 weeks. It's your choice whether to have these tests. You and your partner can talk to your midwife or physician about birth defects tests.    Consider breastfeeding for the healthiest way to feed your baby. Ask your midwife or physician for more information.     As your center of gravity and weight changes, use good body mechanics when changing positions and lifting. For example, use a straight back and your legs for support when lifting instead of bending over. Maintain good posture to prevent straining your muscles. Now is a good time to continue or restart your exercise program. Walking 30-60 minutes daily is an excellent way to keep fit. Yoga and swimming also offer many benefits.    The nausea and fatigue of early pregnancy have usually started to let up, so this is a good time to focus on nutrition. Consider attending a nutrition class. A healthy diet includes about 60 grams of protein each day (3-4 servings of dairy, 2-3 servings of meat/fish/poultry/nuts), 4-6 servings of whole grain foods, and 5-6 servings of fruits and vegetables. Remember to drink 6-8 glasses of water daily.    Watch for warning signs, such as     vaginal bleeding    fluid leaking from your vagina    severe abdominal pain    nausea and vomiting more than 4-5 times a day, or if you are unable to keep anything down    fever more than 100.4 degrees F.     Contact your midwife or physician at [facility] at Phone: [phone] if you have these or any  other concerns. If it's after clinic hours, physician patients should call the [facility] Connection at [phone] ([phone]); midwife patients should call their answering service at [phone].    How can you care for yourself at home?   You can refer to the Starting Out Right book or find it online at http://www.[email or web address]/images/stories/maternity/[facility]-Starting-Out-Right.pdf or http://www.[facility].org/images/stories/flipbooks/[facility]-starting-out-right/[facility]-starting-out-right.html#p=8  You can sign up for a weekly parenting e-mail that gives support, tips and advice from health care professionals that starts with pregnancy and continues through the toddler years. To register, go to [email or web address] at any time during your pregnancy.

## 2021-06-17 NOTE — PROGRESS NOTES
"Clinic Care Coordination Contact    Community Health Worker Follow Up    Goals:   Goals Addressed                 This Visit's Progress      Psychosocial (pt-stated)   20%     Goal Statement: I want to apply for [place] [facility] wait list within one month.    Date Goal set: 02/26/21  Barriers:   Strengths:   Date to Achieve By: 3/30/2021  Patient expressed understanding of goal: Yes  Action steps to achieve this goal:  1. I completed my application with [facility] SW for [place] [facility], I will let CHW know at next outreach if I have received any mail from [facility].  2. I will wait to hear from [facility] office regards to application status.   3. I will updates [facility] office if any change of address, phone number, and new additional member.   4. I will updates CCC team on the status.     Note/FYI:   -Per [facility] encounter dated 3/02/2021: \"We were able to complete the application.   Status: Submitted  Reference #: [id number]  March 1, 2021 3:22PM.\"  -Patient reported on 4/28/2021; pregnant; estimated due date 9/26/2021.     (Updated: 4-)              Discussion: [facility] CHW was able to connect with the patient today. Pt confirmed that she's still waiting to hear from the PHA regards to housing application status and no updates at this time. Pt confirmed that she will connect with [facility] team for updates and any additional needs and updates on the status. Pt is doing well and no other concerns or urgent needs at this time per pt. CHW updated goal above. CHW suggested pt continue to follow up up with PCP as recommendation. Pt is educated that she must report any future change of address, phone number, and additional family member with the [facility] office. Pt confirmed understand and no other questions or concerns.     CHW Next Follow-up: goal follow up.     Outreach frequency: monthly    CHW Plan: 5-      "
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
## 2021-06-18 ENCOUNTER — OFFICE VISIT - HEALTHEAST (OUTPATIENT)
Dept: FAMILY MEDICINE | Facility: CLINIC | Age: 36
End: 2021-06-18

## 2021-06-18 ENCOUNTER — COMMUNICATION - HEALTHEAST (OUTPATIENT)
Dept: FAMILY MEDICINE | Facility: CLINIC | Age: 36
End: 2021-06-18

## 2021-06-18 DIAGNOSIS — Z34.82 ENCOUNTER FOR SUPERVISION OF OTHER NORMAL PREGNANCY IN SECOND TRIMESTER: ICD-10-CM

## 2021-06-18 NOTE — PATIENT INSTRUCTIONS - HE
Patient Instructions by Selene Rae MD at 5/21/2021  9:40 AM     Author: Selene Rae MD Service: -- Author Type: Physician    Filed: 5/21/2021 10:18 AM Encounter Date: 5/21/2021 Status: Addendum    : Selene Rae MD (Physician)    Related Notes: Original Note by Selene Rae MD (Physician) filed at 5/21/2021 10:17 AM         Patient Education   HEALTHY PREGNANCY CARE: 22-26 WEEKS PREGNANT    You are finishing your second trimester. Your baby is developing rapidly. At this stage, babies have a sense of balance, can respond to touch, and are recognizing parent voices.  Your baby will be moving around more now.  You may notice [place] contractions now, which are painless and prepare the uterus for the delivery.    Nutrition: During this time, you may find that your nausea and fatigue are gone. Overall, you may feel better and have more energy than you did in your first trimester. Be sure you are getting enough calcium and iron in your diet. Your prenatal vitamins cannot supply all of the nutrients you need, so continue to eat 3-4 servings of dairy foods and 2-3 servings of meat/fish/poultry/nuts every day. Foods high in iron include: red meats, eggs, dark green vegetables, dark yellow vegetables, nuts, kidney beans and chickpeas. Some cereals are fortified with iron, so look at the food labels for 100% of the daily requirement for iron.     Discuss your work situation with your midwife or physician as needed. If you stand for long periods of time, you may need to make changes and take breaks.    [place] for childbirth and parenting classes, including an infant CPR class. Breastfeeding classes are recommended too.    Plan for the gestational diabetes screening between weeks 24-28. You can eat normally before that visit; we would suggest making sure you have protein foods, but not a lot of carbohydrates or sugary foods.    Your blood type was determined at your first OB  "appointment. If you are Rh negative, you should discuss the need for a Rhogam shot with your midwife or physician. This would be administered around 28 weeks if necessary.    How can you care for yourself at home?   You can refer to the Starting Out Right book or find it online at [email or web address] or [email or web address]     You can sign up for a weekly parenting e-mail that gives support, tips and advice from health care professionals that starts with pregnancy and continues through the toddler years. To register, go to [email or web address] at any time during your pregnancy.    Watch for the warning signs of premature labor:   \" Dull, low backache  \" Contractions of the uterus, menstrual-like cramps  \" Abdominal cramping with or without diarrhea  \" More pelvic pressure  \" Increase or change in vaginal discharge.     Continue to watch for signs and symptoms of preeclampsia:   \" Sudden swelling of your face, hands, or feet   \" New vision problems such as blurring, double vision, or flashing lights  \" A severe headache not relieved with acetaminophen (Tylenol)  \" Sharp or stabbing pain in your right or middle upper abdomen        Remember that labor doesn't have to hurt. Never hesitate to call your midwife or physician or their staff at [facility] at Phone: [phone] for any one of these warning signs - or if something just doesn't feel right. If it's after clinic hours, physician patients should call the [facility] at [phone] ([phone]); midwife patients should call their answering service at [phone].    BREASTFEEDING TIPS FOR NEW MOMS     Importance of skin-to-skin contact:  ? Gets breastfeeding off to a good start  ? Keeps baby warm and is great for bonding  ? Provides calming effects and helps to stabilize " "breathing and  blood sugar  ? Helps the uterus to contract and bleed less    Importance of feeding whenever baby shows signs of  being hungry, \"feeding on cue\":  ? Helps create a good milk supply appropriate to the babys needs  ? Less breastfeeding complications such as engorgement or  low supply  ? Helps baby get enough milk  ? Milk supply is determined by how often baby nurses and empties  the breast.  ? Feeding is for comfort as well as nutrition    Importance of good latch (positioning and attaching  baby properly at breast):  ? Helps prevent sore nipples  ? Helps ensure baby gets enough milk and supports moms breast  milk supply    Risks of giving baby supplements other  than moms breastmilk  Breastfeeding alone is recommended for the first 6 months, if not it:  ? Can make baby more prone to illness  ? Can make baby less satisfied at breast (wanting larger amounts or  faster flow)  ? Reduces milk supply    Importance of rooming-in:  ? Increases parent confidence while mother is supported by the  hospital staff  ? Caregivers learn how to care for baby and recognize feeding cues  ? Enables feeding whenever baby needs to eat  ? Baby is comforted with mom and learns to recognize caregivers    Avoiding use of pacifiers:  ? Use of pacifiers in the first weeks can make it difficult to make a full  milk supply  ? May interfere with baby learning to latch well      2017 [facility] 234839.  [id number]. [facility] 11.17         Breastfeeding   Why Its Important and What to Expect     Your breast milk is the best food for your baby--and  breastfeeding can help you be healthy as well.    Though breastfeeding is natural, it is a learned process for both mother and baby. To prepare, there are things you can learn--and do--before your baby is born.    ? Learn the benefits of breastfeeding.    ? Understand the basic process.    ? Know what to expect in the hospital.    ? Arrange breastfeeding support for the first few  weeks after birth.    ? " Take a breastfeeding class (see back page).    ? Talk to your midwife, nurse or doctor if you have  Questions.    The benefits of breastfeeding    Human milk changes to meet the needs of a growing baby. It is all a baby needs for the first six months of life.    In fact, babies who receive only human milk for the  first six months are less likely to develop colds, the  flu, colic, asthma, ear infections, food allergies and  diarrhea (loose, watery stools). They may be less likely      to be overweight as children, and they are less likely to develop diabetes later in life. Some studies also show that infants have a higher IQ if they are .    Breastfeeding can:    ? Help you and your baby develop a special bond--  and make you feel proud that you can feed your  Baby.    ? Reduce the total amount of blood you will lose  after delivery.    ? Help your uterus return to its non-pregnant size.    ? Reduce the risk of Sudden Infant Death Syndrome (SIDS).    ? Help you lose your pregnancy weight more quickly.    ? Help delay the return of your monthly periods.    ? Lower your risk of some breast and ovarian  cancers--as well as osteoporosis (bone loss)--later in life.    ? Save you more than $300 per month. (This  includes the cost of formula and medical bills.  Formula-fed babies get sick more often.)          If you are deaf or hard of hearing, please let us know. We provide many free services including  sign language interpreters, oral interpreters, TTYs, telephone amplifiers, note takers and written materials.        How to breastfeed    Skin-to-skin contact    Hold your baby on your chest skin-to-skin right after  birth. Skin contact calms your baby, steadies their  breathing and keeps your baby warm. Your baby will be alert and will likely want to feed within the first hour after birth.    Babies are born with reflexes that help them  breastfeed. Your body will be ready with early milk  (called colostrum), so  you will have all the milk your  baby needs for that first feeding. Your nurse will help you get started.    Keep your baby with you and breastfeed whenever  your baby is hungry. Offering the breast early and  often helps your body keep making lots of milk.    How to position your baby    There are many positions for breastfeeding.              No matter which position you choose, support your  babys back, shoulders and neck. The head and body should be in a straight line, and the entire body should face the breast. Your baby should be able to tilt the head back easily. Your baby shouldnt have to reach out to feed. Also make sure your babys nose is level with your nipple. This way, your baby will find it easier to attach to your breast.    Finally, get comfortable. Use pillows to support your  body. Dont lean over or slump to reach your baby.  Once your baby is attached to the breast, its okay to change your position slightly.    You will feel a bit of a tugging at first, but you should  never feel pain. If you do, ask your nurse or lactation expert for help. She will teach you how to latch your baby onto the breast in a way that feels more comfortable.    Breastfeeding in the hospital    For the first three days after birth, your body will  produce early milk called colostrum. This milk is  full of calories and antibodies to help keep your baby healthy. It is all your baby needs for the first few days.    Remember, babies do not eat anything while inside  you. Right after birth, your baby will only need a little bit of milk (about 1 teaspoon per feeding) to get the digestive system working well. Your baby will not need any formula--your body will make the right amount of milk.    Breastfeed whenever your baby shows signs of hunger. (See next page for a list of signs.) Crying is a late sign of hunger.    Babies often lose weight in the days after birth. This  is normal. By two weeks of age, your baby should be back  to their birth weight. Your care team will watch your babys weight carefully.    If you are unable to breastfeed in the hospital, your  care team may suggest pasteurized donor human milk for your baby.               The Most Important Points to Remember    ? Your breast milk is the perfect food for your  baby. Breastfeeding has a lot of health benefits  for you as well.    ? Hold your baby skin-to-skin as soon as possible  after birth. Do this for as long as you can. Even  if your baby doesnt go to the breast right away,  skin-to-skin contact helps your body make  more milk. This lets you get an early start on  Breastfeeding.    ? Learn how to position your baby at the breast.  This will help your baby feed well, and it will  keep you comfortable.    ? Feed your baby whenever your baby wants to  eat. You are feeding a baby, not a clock!    ? Signs that your baby is ready to eat include:  starting to wake up, chewing on fists, moving  the face from side to side, opening and closing  the mouth, sticking out the tongue and turning  toward the breast when held. Crying is a late  sign of hunger, so look for the earlier signals  that your baby makes.    ? Feed your baby only human milk for at least  six months. The World Health Organization  recommends breastfeeding for the first year,  noting it is a eliazar baby who is  for  the first two years.    ? While in the hospital, plan to keep your baby  with you at all times, except for certain medical  procedures. This is an important time for you  and your baby to get to know each other and  practice breastfeeding.     Common questions    Below are questions that many women have about  breastfeeding. You will find further information in the  childbirth book your care team gave you. If you have more questions, speak with your midwife, nurse or doctor.    How do I involve my partner, family and  friends and get their help and support?    Sometimes family and friends  dont understand why  you want to breastfeed. Perhaps they themselves  didnt breastfeed, or they werent  as infants. Tell them about the benefits of breastfeeding and how important it is to feed only human milk for the first six months or so. If you feed often, you will make plenty of milk to help your baby grow, fight illness and get the best start possible.    After two to four weeks--once your baby is feeding  well and your milk supply is well established--your  partner and others can feed the baby your milk from  a cup, dropper or bottle. You can remove milk from  your breast (by hand or pump) and store it for later  use. This way, your baby will have your milk even  when youre away.    Remind everyone that there are lots of ways to help  that dont involve feeding: making meals, caring for  your other children, comforting the baby if they cries, changing diapers, running errands and more.    Is breastfeeding painful?    Not usually. There are ways to prevent pain and to  treat it if it happens.    The best ways to avoid pain are to feed your baby  often, use a good position and correctly latch your  baby onto the breast. These help prevent the two  most common sources of pain: sore nipples and  engorgement (overly full breasts). You will learn more about these topics in a breastfeeding class and in the hospital after your baby is born.         How much time does it take to breastfeed?    Some new mothers feel that all they do is breastfeed. In the early weeks, a baby eats 8 to 12 times per day. Sometimes babies will cluster feedings close together. At other times, there are longer stretches between feedings.    Remember, your newborns stomach will be about  the size of a walnut. Your baby wont eat much at each feeding. If you feed your baby often in the first days, your baby--and your milk supply--will grow. Your baby will eat more at each session, and you will need to nurse less often. Within a few  weeks, most women find that breastfeeding is easier and takes less time than formula feeding.    How will I know if my baby is getting  enough milk?    Your body will start making milk as soon as your  baby is born. The more often you put your baby to  breast, the more milk you will make. You should avoid pacifiers for the first several weeks until breastfeeding is well established--you want your baby to do all their sucking at the breast. This will help you make more milk.    There are signs that your baby is getting enough milk. You will learn these signs over time. For example:    ? You will count wet and soiled diapers, because  these show how much milk your baby is getting.    ? Your baby will seem satisfied after feedings.    ? Your baby will grow and gain weight after the first  few days.    Are there reasons why a woman shouldnt  breastfeed her baby?    There are a few medical concerns that prevent  breastfeeding. In mothers, these include being HIVpositive, having active or untreated TB (tuberculosis)  and using street drugs or some medicines. These  mothers usually choose infant formula for their  Babies.    A few women choose not to breastfeed for personal  reasons. But most mothers can breastfeed. If you are not sure if its okay to breastfeed, ask your care team.    Getting support    Before your baby is born, get as much information  as you can. Sign up for a breastfeeding class. Most  childbirth classes discuss breastfeeding, but a special class will give more in-depth information.    ? In the [facility]: Go to [facility] at [employer].    ? In [facility]: Go to www.[email or web address].  Click on Classes-and Events at the bottom of the  page, then search for breastfeeding. Or, call [phone].    Remember, help is available from your hospital, clinic, lactation experts or online. If you need help after leaving the hospital:    ? Call your babys clinic. (If you dont have a  clinic,  call [phone] and ask for a referral.)    ? Call a lactation consultant. (If you need help  finding a location that offers lactation support, call  [phone].)    ? Call [employer] (24 hours a  day) at [phone] [[phone]].    ? Call the Women, Infants and Children (WIC)  program at 1-283.776.9940.    ? Call the National Womens Health Information  Center (English and Sinhala) at 1-935.625.7703 or  go to www.womenshealth.gov/breastfeeding.    ? Go to [facility].       For informational purposes only. Not to replace the advice of your health care provider. Copyright   2010 [facility]. All rights reserved. Clinically reviewed by [facility]. [place] 141100 - REV 06/18.

## 2021-06-19 NOTE — LETTER
Letter by Selene Rae MD at      Author: Selene Rae MD Service: -- Author Type: --    Filed:  Encounter Date: 8/14/2019 Status: (Other)         Shaila Savage  [address]  [facility] [address]             August 14, 2019         Dear Ms. Savage,    Below are the results from your recent visit:    Resulted Orders   Wet Prep, Vaginal   Result Value Ref Range    Yeast Result No yeast seen No yeast seen    Trichomonas No Trichomonas seen No Trichomonas seen    Clue Cells, Wet Prep No Clue cells seen No Clue cells seen   Chlamydia trachomatis & Neisseria gonorrhoeae, Amplified Detection   Result Value Ref Range    Chlamydia trachomatis, Amplified Detection Negative Negative    Neisseria gonorrhoeae, Amplified Detection Negative Negative   Gynecologic Cytology (PAP Smear)   Result Value Ref Range    Case Report       Gynecologic Cytology Report                       Case: [id number]                                   Authorizing Provider:  Selene Rae MD    Collected:           08/06/2019 1001              Ordering Location:     [facility]  Received:            08/06/2019 1002                                     Medicine/OB                                                                  First Screen:          Nancie Villarreal CT                                                                               (ASCP)                                                                       Specimen:    SUREPATH PAP, DIAGNOSTIC, Endocervical/cervical                                            Interpretation  Negative for squamous intraepithelial lesion or malignancy.      Negative for squamous intraepithelial lesion or malignancy    Result Flag Normal Normal    Specimen Adequacy       Satisfactory for evaluation, endocervical/transformation zone component present    HPV Reflex? Yes regardless of result     HIGH RISK No     LMP/Menopause Date 8/4/19     Abnormal Bleeding No     Pt  Status postpartum     Birth Control/Hormones None     Previous Normal/Date 2018     Prev Abn Date/Dx 2018- normal pap with high risk HPV     Cervical Appearance Normal    HPV High Risk DNA Cervical   Result Value Ref Range    HPV Source SurePath     HPV16 DNA Negative NEG    HPV18 DNA Negative NEG    Other HR HPV Negative NEG    Final Diagnosis SEE NOTES       Comment:      This patient's sample is negative for HPV DNA.  This test was developed and its performance characteristics determined by the  [facility], [facility]. It  has not been cleared or approved by the FDA. The laboratory is regulated under  CLIA as qualified to perform high-complexity testing. This test is used for  clinical purposes. It should not be regarded as investigational or for  research.  (Note)  METHODOLOGY:  The Roche fahad 4800 system uses automated extraction,  simultaneous amplification of HPV (L1 region) and beta-globin,  followed by  real time detection of fluorescent labeled HPV and beta  globin using specific oligonucleotide probes . The test specifically  identifies types HPV 16 DNA and HPV 18 DNA while concurrently  detecting the rest of the high risk types (31, 33, 35, 39, 45, 51,  52, 56, 58, 59, 66 or 68).    COMMENTS:  This test is not intended for use as a screening device  for women under age 30 with normal cervical   cytology.  Results should  be correlated with cytologic and histologic findings. Close clinical  followup is recommended.        Specimen Description Cervical Cells       Comment:        Performed and/or entered by:  [address] [address]        Your pap smear is normal and you do NOT have the HPV infection that causes cervical cancer.  We can recheck your pap smear again in 3 years.      No sexual infections.     Please call with questions or contact us using [place].    Sincerely,  Electronically  signed by Selene Rae MD

## 2021-06-20 NOTE — LETTER
Letter by Magaly Fisher PA-C at      Author: Magaly Fisher PA-C Service: -- Author Type: --    Filed:  Encounter Date: 9/30/2020 Status: (Other)         09/30/20    To Whom It May Concern:    Shaila Savage was seen today at [facility] for Pregnancy Confirmation.    She is 7w4d.    Due Date: Estimated Date of Delivery: 5/15/21     Thank you.    Magaly Fisher PA-C

## 2021-06-21 NOTE — PROGRESS NOTES
: Linda ID: [id number] Agency: [facility]    Patient stated that she could use an extra support. After checking her [place] on [place] it is showing that she is not active. Care guide scheduled an appointment with the patient for 11/9/18 at 9:00 am.    Upcoming Appointments:  11/9/18 at 9:00 with PRISCILLA CLEMENS and [place] Care guide 3  11/16/18 at 10:20 with Dr. Rae    Next Outreach: 11/9/18

## 2021-06-21 NOTE — PROGRESS NOTES
Chief Complaint:  Chief Complaint   Patient presents with     Pregnancy Intake     dizziness, nausea, x1 month, positive pregnancy test at home     Dry skin     on face, itchy, requesting medication          need help with health insurance     HPI:   Shaila Savage is a 32 y.o. female is here for pregnancy intake.  She is .  Patient's last menstrual period was 08/15/2018 (approximate).  Got some kind of nausea medicine from her sister-in-law which helped, but would like nausea/vomiting medicine prescribed.  Irregular periods.    Past medical history: reviewed and updated.  Past Medical History:   Diagnosis Date     Group B streptococcal carriage complicating pregnancy 2017     Normal delivery 2017     No past surgical history on file.    Current Outpatient Prescriptions:      docusate sodium (COLACE) 100 MG capsule, Take 1 capsule (100 mg total) by mouth daily., Disp: 30 capsule, Rfl: 3     doxylamine (UNISOM) 25 mg tablet, Take 1/2 tablet before bed, as needed for nausea., Disp: 35 tablet, Rfl: 0     MAPAP EXTRA STRENGTH 500 mg tablet, Take 1 tablet (500 mg total) by mouth as needed for pain., Disp: 30 tablet, Rfl: 3     prenatal vit no.112-folate no6 1 mg Chew, Chew 1 tablet daily., Disp: 100 tablet, Rfl: 3     pyridoxine, vitamin B6, (VITAMIN B-6) 25 MG tablet, Take 1 tablet by mouth 3 times a day, as needed for nausea., Disp: 60 tablet, Rfl: 0     white petrolatum-mineral oil (EUCERIN) Crea, Apply to skin daily., Disp: 480 g, Rfl: 3    Social:  Social History   Substance Use Topics     Smoking status: Never Smoker     Smokeless tobacco: Never Used      Comment: no exposure     Alcohol use No       OBJECTIVE:  No Known Allergies  Vitals:    10/12/18 1335   BP: 118/66   Pulse: 70   Resp: 18     Body mass index is 25.08 kg/(m^2).    Vital signs stable as recorded above  General: Patient is alert and oriented x 3, in no apparent distress  Remainder of physical exam deferred to patient's  First OB Visit.    Results:  Results for orders placed or performed in visit on 10/12/18   Culture, Urine   Result Value Ref Range    Culture No Growth    Pregnancy (Beta-hCG, Qual), Urine   Result Value Ref Range    Pregnancy Test, Urine Positive (!) Negative   Urinalysis, OB Screen   Result Value Ref Range    Glucose, UA Negative Negative    Ketones, UA Negative Negative    Protein, UA Negative Negative mg/dL   Hepatitis B Surface antigen (HBsAG)   Result Value Ref Range    Hepatitis B Surface Ag Negative Negative   HIV Antigen/Antibody Screening Cascade   Result Value Ref Range    HIV Antigen / Antibody Negative Negative   HML Antibody Screen   Result Value Ref Range    HML Antibody Screen Negative Negative   RPR   Result Value Ref Range    Treponema Antibody (Syphilis) Negative Negative   Lead, Blood   Result Value Ref Range    Lead  <5.0 ug/dL    Collection Method Venous     Lead Retest No    Drugs of Abuse 1,Urine   Result Value Ref Range    Amphetamines Screen Negative Screen Negative    Benzodiazepines Screen Negative Screen Negative    Opiates Screen Negative Screen Negative    Phencyclidine Screen Negative Screen Negative    THC Screen Negative Screen Negative    Barbiturates Screen Negative Screen Negative    Cocaine Metabolite Screen Negative Screen Negative    Oxycodone Screen Negative Screen Negative    Creatinine, Urine 23.1 mg/dL   HM1 (CBC with Diff)   Result Value Ref Range    WBC 7.3 4.0 - 11.0 thou/uL    RBC 5.11 3.80 - 5.40 mill/uL    Hemoglobin 13.4 12.0 - 16.0 g/dL    Hematocrit 42.8 35.0 - 47.0 %    MCV 84 80 - 100 fL    MCH 26.2 (L) 27.0 - 34.0 pg    MCHC 31.3 (L) 32.0 - 36.0 g/dL    RDW 13.4 11.0 - 14.5 %    Platelets 262 140 - 440 thou/uL    MPV 10.2 8.5 - 12.5 fL    Neutrophils % 62 50 - 70 %    Lymphocytes % 25 20 - 40 %    Monocytes % 7 2 - 10 %    Eosinophils % 4 0 - 6 %    Basophils % 1 0 - 2 %    Neutrophils Absolute 4.5 2.0 - 7.7 thou/uL    Lymphocytes Absolute 1.8 0.8 - 4.4 thou/uL     Monocytes Absolute 0.5 0.0 - 0.9 thou/uL    Eosinophils Absolute 0.3 0.0 - 0.4 thou/uL    Basophils Absolute 0.1 0.0 - 0.2 thou/uL     Other screening pregnancy lab work is ordered and pending.    Patient scored a 9/30 on [place]  Depression Screen.    Assessment and Plan:  1. Pregnancy Intake Appointment.  Sahila Savage is 32 y.o. and .  Patient's last menstrual period was 08/15/2018 (approximate).  Estimated Date of Delivery: 19  She will be seeing Dr. Rae for OB care.  Screening pregnancy lab work was drawn.  Prenatal vitamins prescribed.  Problem list and current medications reviewed and updated.  Dating ultrasound ordered.  Prenatal info packet given.  Flu shot given.  Last delivery 17.  Referral made to Care Management to help with insurance, per patient request.  First Trimester Screening discussed with patient.  She thinks she would only like to have the Quad Screen.    Follow up in 4 weeks.  Please see OB Episode for complete details.  Visit was approximately 40 minutes, greater than 50% of time spent in face-to-face counseling and coordination of care.    This dictation uses voice recognition software, which may contain typographical errors.

## 2021-06-21 NOTE — PROGRESS NOTES
: Adrian ID: [id number] Agency: Language Line    Attempt 1:  Care guide left a message for the patient about CCC enrollment.  If the patient is trying to call the Care guide back transfer them to Brandon Martinez at [phone].    Next Outreach: 10/23/18

## 2021-06-21 NOTE — LETTER
Letter by Selene Rae MD at      Author: Selene Rae MD Service: -- Author Type: --    Filed:  Encounter Date: 5/21/2021 Status: (Other)         May 21, 2021     Patient: Shaila Savage   YOB: 1985   Date of Visit: 5/21/2021       Tiffany:    Shaial Savage was seen in my clinic on 5/21/2021.  I am worried about her health and the baby's growth with this pregnancy.  She does not seem to be getting enough sleep with her 3rd pregnancy.  I advise that she get at least 8 hours of sleep a night- this may mean she goes to bed with the kids and is not able to stay up to wait for you to get home from work.      If you have any questions or concerns, please don't hesitate to call.    Sincerely,         Electronically signed by Selene Rae MD

## 2021-06-21 NOTE — LETTER
Letter by Rey Malik LGSW at      Author: Rey Malik LGSW Service: -- Author Type: --    Filed:  Encounter Date: 2/26/2021 Status: (Other)       CARE COORDINATION    February 26, 2021    Shaila Savage  [address]  [facility] [address]      Dear Shaila,    I am a clinic care coordinator who works with Selene Rae MD at [facility]. I wanted to thank you for spending the time to talk with me.  Below is a description of clinic care coordination and how I can further assist you.      The clinic care coordination team is made up of a registered nurse,  and community health worker who understand the health care system. The goal of clinic care coordination is to help you manage your health and improve access to the health care system in the most efficient manner. The team can assist you in meeting your health care goals by providing education, coordinating services, strengthening the communication among your providers and supporting you with any resource needs.    Please feel free to contact the Community Health Worker at [phone] with any questions or concerns. We are focused on providing you with the highest-quality healthcare experience possible and that all starts with you.     Sincerely,     Rey Malik    Enclosed: I have enclosed a copy of the Care Plan. This has helpful information and goals that we have talked about. Please keep this in an easy to access place to use as needed.

## 2021-06-21 NOTE — LETTER
Letter by Rey Malik LGSW at      Author: Rey Malik LGSW Service: -- Author Type: --    Filed:  Encounter Date: 2/26/2021 Status: (Other)       Care Plan  About Me:    Patient Name:  Shaila Savage    YOB: 1985  Age:         35 y.o.   [facility] MRN:    [id number] Telephone Information:  Home Phone [phone]   Mobile [phone]       Address:  [address] [place], [place] [address] [facility] [address] Email address:  [email or web address]      Emergency Contact(s)  Extended Emergency Contact Information      Name: Tiffany Barrow  Address:       [address] [place] [address]  Relation: Spouse          Primary language:  Hmong     needed? Yes   [facility]:  [phone] op. 1  Other communication barriers: Language barrier  Preferred Method of Communication:     Current living arrangement: I live in a private home with family  Mobility Status/ Medical Equipment: Independent    Health Maintenance  Health Maintenance Reviewed: Not assessed    My Access Plan  Medical Emergency 911   Primary Clinic Line Selene Rae MD - [phone]   24 Hour Appointment Line [phone] or  [phone]) (toll-free)   24 Hour Nurse Line [phone] (toll-free)   Preferred Urgent Care [facility], [phone]   Preferred Hospital [facility]  [phone]   Preferred [facility]acy [id number] - [place], [place] - [address] AT [facility] [address]     Behavioral Health Crisis Line The National Suicide Prevention Lifeline at 1-898.231.1422 or 911             My Care Team Members  Patient Care Team       Relationship Specialty Notifications Start End    Selene Rae MD PCP - General Family Medicine  9/11/17     Phone: [phone] Fax: [phone] [address] [facility] [address]    Magaly Fisher PA-C Assigned PCP   10/19/20     Phone: [phone] Fax: [phone]  [facility] [address]    Rey Malik LGSW Lead Care Coordinator Primary Care - CC Admissions 2/26/21     Fax: [phone]         Sandra Fields CHW Community Health Worker Primary Care - CC Admissions 2/26/21     Phone: [phone] Fax: [phone]                My Care Plans  Self Management and Treatment Plan  Goals and (Comments)  Goals        General    Psychosocial (pt-stated)     Notes - Note created  2/26/2021 10:21 AM by Rey Malik LGSW    Goal Statement: I want to apply for [place] [facility] wait list within one month  Date Goal set: 02/26/21  Barriers:   Strengths:   Date to Achieve By: 3/30/2021  Patient expressed understanding of goal: Yes  Action steps to achieve this goal:  1. I will talk with my  about the process and get his Social Security number to complete the application                   Advance Care Plans/Directives Type:        My Medical and Care Information  Problem List   Patient Active Problem List   Diagnosis   ? Acne vulgaris   ? Female stress incontinence   ? 8 weeks gestation of pregnancy   ? Class 1 obesity due to excess calories without serious comorbidity with body mass index (BMI) of 32.0 to 32.9 in adult   ? Microcytosis   ? Alpha thalassemia minor trait      Current Medications and Allergies:  See printed Medication Report.    Care Coordination Start Date: 2/26/2021   Frequency of Care Coordination: monthly   Form Last Updated: 02/26/2021

## 2021-06-21 NOTE — PROGRESS NOTES
11-16-18  Called patient to reschedule SW Assessment miss on 11-9-18.  No answer. Voice mail not set up yet unable to leave voice message.    Attempt 1 to reschedule SW Assessment.  Reach out 11-26-18

## 2021-06-22 NOTE — PROGRESS NOTES
Morning sickness is starting to get better but notes very sensitive to strong odors in class at times.  Gets bad headaches with odors and cold weather.  No more vomiting but ongoing constipation- mild.  No spotting.  interested in quad screening today.  Reviewed all testing available.  Noting some sore throat.  No fever.  Did get flu shot.

## 2021-06-22 NOTE — PROGRESS NOTES
33yr F PMH: 16 wks pregnant and a 1 yr old.  No other medical hx. Currently lives in a single family house with her step dtr who is 20 years old and able to assist with items.  Step dtr was able to apply thru the cty for insurance for [employer].  Spouse works, drives and brings patient to appointments.  Patient has medical bills at home she states she will bring in to clinic for assistance with.  Currently receives zero SNAP.  Patient enrolled short term for below goals.    RN Recommendations and Referrals  Financial resource guide consult/follow-up: please send referral if not already sent    Action Plan    RN Will  Will add the patient to [facility] RN tracking list  Be available to the patient as nursing needs arise    Care Guide Will  Financial resource guide consult/follow-up: please send referral if not already sent    Goals  Goals        Patient Stated      I want to ensure my unborn child gets medical insurance in May/June 2019. (pt-stated)      Action steps to achieve this goal  1.  I will speak with the SW in the hospital to notify them of my wishes to obtain medical insurance.  2.  I will speak to the [facility] CG at outreach telephone calls.    Date goal set:  12/14/18        I would like assistance with my medical bills at home in the next 30-60 days. (pt-stated)      Action steps to achieve this goal  1. 1. I will speak with the [facility] CG at outreach telephone calls.  2.  I will speak with the [facility] FRG to identify community resources that might be available to me.  3.  I will provide any necessary paperwork/documentation in a timely manner if needed to assist with this process    Date goal set:  12/14/18        I would like Financial Assistance/SNAP to assist me in the next 30-60 days. (pt-stated)      Action steps to achieve this goal  1. 1. I will speak with the [facility] CG at outreach telephone calls.  2.  I will speak with the [facility] FRG to identify community resources that might be available to me.  3.  I will provide any  necessary paperwork/documentation in a timely manner if needed to assist with this process.  4.  The best number for the FRG to call is [phone]    Date goal set:  12/14/18              Clinic Care Coordination RN Assessed Needs  Patient Centered Assessment Method-PCAM TOTAL SCORE: 16 (12/18/2018 10:57 AM)    Level 1:  A score of 12-24 indicates that the patient has very little to no initial need for RN or SW intervention.  Standard care guide outreach/support should be appropriate at the discretion of the .  The care guide can reach out to the RN/SW as needed for support or with new concerns.      PCAM (Patient Centered Assessment Method)   HEALTH AND WELL-BEING  Other Physical Health Concerns:: none  RN Assessment: Physical Health Needs: No identified areas of uncertainty or problems already being investigated  RN Assessment: Physical Health Problems: No identified areas of concern  Mental Health Concerns: Denies concerns that require further investigation  RN Assessment:Other Mental Well-Being Concern: No identified areas of concern  RN Assessment: Lifestyle Behaviors: No identified areas of concern  SOCIAL ENVIRONMENT  RN Assessment: Home Environment: Consistently safe, supportive, stable, no identified problems  RN Assessment: Daily Activites: No identified problems or perceived positive benefits  RN Assessment: Social Network: Good participation with social networks  RN Assessment: Financial Resources: Financially secure, some resource challenges  HEALTH LITERACY AND COMMUNICATION  RN Assessment: Health Literacy: Reasonable to good understanding and already engages in managing health or is willing to undertake better management  RN Assessment: Engagement: Adequate communication, with or without minor barriers  SERVICE COORDINATION  Other Services: Other care/services in place and adequate  Coordination of Services: Required care/services in place and adequately coordinated  PCAM TOTAL SCORE:  16      Emergency Plan  Emergency Plan Recommendation:    When to Use the Emergency Department (ED)  An emergency means you could die if you don t get care quickly. Or you could be hurt permanently (disabled). Read below to know when to use -- and when not to use -- an emergency department (also called ED).    Dangers to your life  Here are examples of emergencies. These need immediate care:  A hard time breathing  Severe chest pain  Choking  Severe bleeding  Suddenly not able to move or speak  Blacking out (fainting)`  Poisoning    Dangers of permanent injuries  Here are other emergencies. These also need immediate care:  Deep cuts or severe burns  Broken bones, or sudden severe pain and swelling in a joint    When it s an emergency  If you have an emergency, follow these steps:    1. Go to the nearest emergency department  If you can, go to the hospital ED closest to you right away.  If you cannot get there right away, or if it is not safe to take yourself, call 911 or your police emergency number.  2. Call your primary care doctor  Tell your doctor about the emergency. Call within 24 hours of going to the ED.  If you cannot call, have someone call for you.  Go to your doctor (not the ED) for any follow-up care.    When it s not an emergency  If a problem is not an emergency, follow these steps:    1. Call your primary care doctor  If you don t know the name of your doctor, call your health plan.  If you cannot call, have someone call for you.  2. Follow instructions  Your doctor will tell you what you should do.  You may be told to see your doctor right away. You may be told to go to the ED. Or you may be told to go to an urgent care center.  Follow your doctor s advice.  Handwashing: Tips for Patients, Family, and Friends  Germs are everywhere around us. Normally, we live with germs without getting sick. In certain cases, harmful germs cause us to get sick with an infection. Or we can spread harmful germs to  others and cause them to get sick. Keeping your hands clean is the best way to prevent getting or spreading germs that cause infection. Wash your hands with soap and water or use an alcohol-based hand .  When to clean your hands: For patients  In the hospital or in your home, you can come in contact with many harmful germs. To help prevent infection, wash your hands often, especially:  After using the bathroom  Before and after eating  After coughing or sneezing  After using a tissue  After touching or changing a dressing or bandage  After touching any object or surface that may be contaminated  After touching an animal during a pet therapy session (hospital)  After touching an animal, cleaning up after a pet, or preparing food for pets (home)  If you don t have access to soap and water, use an alcohol-based hand gel containing at least 60% alcohol. These products kill most germs and are easy to use. But use soap and water (not alcohol-based hand gel) if your hands are visibly dirty.  When to clean your hands: For family and friends  When visiting or caring for a loved one, washing your hands or using an alcohol-based hand  can help stop germs from spreading. Wash your hands:  Before entering and after leaving the patient s room  As soon as you remove gloves or other protective clothing  After changing a dressing or bandage  After any contact with blood or other body fluids  After touching or changing the patient s bed linen or towels  After touching an animal during a pet therapy session (hospital)  After touching an animal, cleaning up after a pet, or preparing food for pets (home)  Many hospitals have sinks or gel dispensers right outside patient rooms. If not, carry a bottle of alcohol-based hand gel with you, and use it every time you visit. Use soap and water (not alcohol-based hand gel) if your hands are visibly dirty.  Tips for good handwashing  Here are some suggestions to follow:  Use warm  water and plenty of soap. Work up a good lather.  Clean the whole hand, including under your nails, between your fingers, and up the wrists.  Wash for at least 15 to 20 seconds. Don t just wipe. Scrub well.  Rinse, letting the water run down your fingers, not up your wrists.  Dry your hands well. Use a paper towel to turn off the faucet and open the door.  Time matters  The longer you wash your hands, the more germs you ll remove. Most people wash their hands for 6 to 7 seconds. But at least 15 seconds are needed to remove germs. Singing Happy Birthday or the Alphabet Song are examples of how long 15 seconds would be. To protect yourself and others from infection, washing for 30 seconds is best.  How to use an alcohol-based hand   Alcohol-based hand  may kill more germs than soap and water. Use them when your hands aren t visibly dirty. For best results, follow these steps:  Choose a gel or spray that contains at least 60 percent alcohol. Products with less alcohol may not kill germs.  Spread about a tablespoon of  in the palm of one hand.  Rub your hands together briskly, cleaning the backs of your hands, the palms, between your fingers, and up the wrists.  Rub until the  is gone, and your hands are completely dry.

## 2021-06-22 NOTE — PROGRESS NOTES
: Robel Ramirez ID: [id number] Agency: Language Line    Care guide called at the request of the PCP to talk to the patient about CCC. The Care guide informed the patient about CCC and asked where she thinks that she could use and she stated that the most urgent need she has is getting Medical Assistance. The patient does not have active insurance and needs help with obtaining it. The Care guide tried checking online on [place] but the patient is showing non active. A RN Assessment was scheduled for 12/14/18 at 10:00 am.     Upcoming Appointments:  12/7/18 at 9:40 with Dr. Rae  12/14/18 at 10:00 with CCC RN    Next Outreach: 12/14/18

## 2021-06-22 NOTE — PROGRESS NOTES
Attempt 2: Care Guide called patient with interrupter [phone].  If this patient is returning my call, please transfer to [facility] at ext [phone]    Next outreach: 1/16/19.

## 2021-06-22 NOTE — PROGRESS NOTES
See Encounter Below for More Information:     Outreach:  1/15/19: FRG called patient, and screened for SNAP/CASH, pt will talk with her spouse about applying & let Care Guide know if she wants to move forward. Patient's main concern is prescription costs and housing. Patient has medical assistance, unsure why she would have prescription costs. FRG will send message to care guide to set her up with [facility] SW. No further needs from [facility]. Removing patient from FR panel.     19: FRG called patient regarding referral and left VM to call back. FRG will call again in one week for attempt 3.    Health Insurance Information:   [place]: Medical Assistance.  Elig Type PX: Pregnant woman  Eligibility Begin Date: 2018  Eligibility End Date: 2019  PMI # [id number]    SNAP/CASH Application Screenin. Have you already for county benefits before?  2. How many people in household? 4  3. What is your monthly income (include all tax members)? $1,700 spouse  4. Do you have a bank account?   5. Do you have any utility bills (electricity, rent, mortgage, phone, insurance, medical bills, etc.)?   6. Do you have social security cards and/or green cards?

## 2021-06-22 NOTE — PROGRESS NOTES
: Kyleigh ID: [id number] Agency: Language Line    Attempt 2:  Care guide the patient about CCC enrollment. If the patient is trying to call the Care guide back transfer them to Brandon Martinez at [phone].    Next Outreach: 12/3/18

## 2021-06-22 NOTE — PROGRESS NOTES
My Clinic Care Coordination Care Plan    [facility]  [address] [place]  [address]  [phone]    My Preferred Method of Contact:  Phone: [phone]    My Primary/Preferred Language:  Hmong    Preferred Learning Style:  Face to face discussion, Pictures/Diagrams and Hands on teaching    Emergency Contact: Extended Emergency Contact Information  Primary Emergency Contact: Tiffany Barrow  Address: [address] [place], [place] [id number] [place] [address]  Home Phone: [phone]  Mobile Phone: [phone]  Relation: Spouse     PCP:  Selene Rae MD  Specialists:    Care Team            Selene Rae MD   [phone] PCP - General, Family Medicine    [facility] Care Coordination Care Guide, Clinic Care Coordination    Phone #: [phone], Fax #: [phone]    [place] Department of [place]      Karen Smith RN Registered Nurse    Saskia Magallon , Clinic Care Coordination        Hospitalizations and/or ED Visits  Most Recent: 12/7/17     Previous:  N/A  Reason:  Contractions    Concerns regarding my health is making sure my baby has medical insurance and that I have financial support.    Advanced Directive/Living Will: The patient was given information regarding Adanced Directives/Living Will      Shaila elected to enroll in the [facility] Care Coordination.  Shaila was given a copy of the Clinic Care Coordination brochure and full description of how to access their care team both during clinic hours and after hours.   My Care Guide's Name and Phone Number:  Brandon Martinez at [phone]  The Care Guide and myself agreed to be in contact every 2 weeks.      Medication Update  The patient's medication list is up to date per the patient.    Health Maintenance and Immunization Update  The patient's preventive health screenings and immunizations are up to date per the patient.    Emergency Plan  Emergency  Plan Recommendation:     When to Use the Emergency Department (ED)  An emergency means you could die if you don t get care quickly. Or you could be hurt permanently (disabled). Read below to know when to use -- and when not to use -- an emergency department (also called ED).     Dangers to your life  Here are examples of emergencies. These need immediate care:  A hard time breathing  Severe chest pain  Choking  Severe bleeding  Suddenly not able to move or speak  Blacking out (fainting)`  Poisoning     Dangers of permanent injuries  Here are other emergencies. These also need immediate care:  Deep cuts or severe burns  Broken bones, or sudden severe pain and swelling in a joint     When it s an emergency  If you have an emergency, follow these steps:     1. Go to the nearest emergency department  If you can, go to the hospital ED closest to you right away.  If you cannot get there right away, or if it is not safe to take yourself, call 911 or your police emergency number.  2. Call your primary care doctor  Tell your doctor about the emergency. Call within 24 hours of going to the ED.  If you cannot call, have someone call for you.  Go to your doctor (not the ED) for any follow-up care.     When it s not an emergency  If a problem is not an emergency, follow these steps:     1. Call your primary care doctor  If you don t know the name of your doctor, call your health plan.  If you cannot call, have someone call for you.  2. Follow instructions  Your doctor will tell you what you should do.  You may be told to see your doctor right away. You may be told to go to the ED. Or you may be told to go to an urgent care center.  Follow your doctor s advice.  Handwashing: Tips for Patients, Family, and Friends  Germs are everywhere around us. Normally, we live with germs without getting sick. In certain cases, harmful germs cause us to get sick with an infection. Or we can spread harmful germs to others and cause them to get  sick. Keeping your hands clean is the best way to prevent getting or spreading germs that cause infection. Wash your hands with soap and water or use an alcohol-based hand .  When to clean your hands: For patients  In the hospital or in your home, you can come in contact with many harmful germs. To help prevent infection, wash your hands often, especially:    After using the bathroom    Before and after eating    After coughing or sneezing    After using a tissue    After touching or changing a dressing or bandage    After touching any object or surface that may be contaminated    After touching an animal during a pet therapy session (hospital)    After touching an animal, cleaning up after a pet, or preparing food for pets (home)  If you don t have access to soap and water, use an alcohol-based hand gel containing at least 60% alcohol. These products kill most germs and are easy to use. But use soap and water (not alcohol-based hand gel) if your hands are visibly dirty.  When to clean your hands: For family and friends  When visiting or caring for a loved one, washing your hands or using an alcohol-based hand  can help stop germs from spreading. Wash your hands:    Before entering and after leaving the patient s room    As soon as you remove gloves or other protective clothing    After changing a dressing or bandage    After any contact with blood or other body fluids    After touching or changing the patient s bed linen or towels    After touching an animal during a pet therapy session (hospital)    After touching an animal, cleaning up after a pet, or preparing food for pets (home)  Many hospitals have sinks or gel dispensers right outside patient rooms. If not, carry a bottle of alcohol-based hand gel with you, and use it every time you visit. Use soap and water (not alcohol-based hand gel) if your hands are visibly dirty.  Tips for good handwashing  Here are some suggestions to follow:    Use warm  water and plenty of soap. Work up a good lather.    Clean the whole hand, including under your nails, between your fingers, and up the wrists.    Wash for at least 15 to 20 seconds. Don t just wipe. Scrub well.    Rinse, letting the water run down your fingers, not up your wrists.    Dry your hands well. Use a paper towel to turn off the faucet and open the door.  Time matters  The longer you wash your hands, the more germs you ll remove. Most people wash their hands for 6 to 7 seconds. But at least 15 seconds are needed to remove germs. Singing Happy Birthday or the Alphabet Song are examples of how long 15 seconds would be. To protect yourself and others from infection, washing for 30 seconds is best.  How to use an alcohol-based hand   Alcohol-based hand  may kill more germs than soap and water. Use them when your hands aren t visibly dirty. For best results, follow these steps:    Choose a gel or spray that contains at least 60 percent alcohol. Products with less alcohol may not kill germs.    Spread about a tablespoon of  in the palm of one hand.    Rub your hands together briskly, cleaning the backs of your hands, the palms, between your fingers, and up the wrists.    Rub until the  is gone, and your hands are completely dry.    All [facility] clinic patients have access to a Nurse 24 hours a day, 7 days a week.  If you have questions or want advice from a Nurse, please know [facility] is here for you.  You can call your clinic and they will connect you or you can call [facility] at [phone].  [facility] also has Walk In Care clinics in multiple locations.  Call the number listed above for more information about our Walk In Care clinics or visit the [facility] website at [email or web address].    Patient Support  I will ask my emergency contact for help in supporting me in these goals.  Relationship to patient: Spouse  Home # : [phone] , best time to call anytime.

## 2021-06-22 NOTE — PROGRESS NOTES
: Wyatt ID#: [id number] Agency: Language Line    Scheduled Follow Up Call: Attempt 1  Care Guide called and left a message for the patient.  If the patient is returning my call, please transfer them to me, Brandon Martinez, at [phone].    Referral sent to the [facility] for help with SNAP and Cash Assistance    Upcoming Appointments:  1/11/19 at 9:40 with Dr. Rae    Next Outreach: 1/8/19

## 2021-06-22 NOTE — PROGRESS NOTES
: Nelli [facility]: [facility]     The patient would like to apply for SNAP and Cash Assistance. The patient asked about getting insurance for her baby when she gives birth. The Care guide then stated that the patient can talk to the SW at the hospital that should help her apply for insurance right after the baby is born. The patient signed an [place] for [place] Department of [employer] and [facility].     Upcoming Appointments:  1/11/19 at 9:40 with Dr. Rae    Next Outreach: 1/2/19

## 2021-06-22 NOTE — PROGRESS NOTES
Programs applying for: snap/cash (combined application)  [facility] Case#:  Financial worker:  SHANTANU Case #:  Insurance Tracking:  PMI#:    1/2/19: FRG and  called patient on referral and left VM. FRG will make outreach call in 1 week for attempt X2.  Attempt X1    Referral:  The patient would like to apply for SNAP and Cash Assistance.     Thank you,   Brandon

## 2021-06-23 NOTE — PATIENT INSTRUCTIONS - HE
"  Patient Education   HEALTHY PREGNANCY CARE: 18-22 WEEKS PREGNANT    Your baby is continuing to develop quickly. At this stage, babies can now suck their thumbs,  firmly with their hands, and are beginning to hear.    Sometime between 18 and 22 weeks, you will start to feel your baby move. At first, these small fetal movements feel like fluttering or \"butterflies.\" Some women say that they feel like gas bubbles. As the baby grows, these movements will become stronger and be able to be felt through your abdomen.     Nutrition: During this time, you may find that your nausea and fatigue are gone. Overall, you may feel better and have more energy than you did in your first trimester. Be sure you are getting enough calcium and iron in your diet. Your prenatal vitamins cannot supply all of the nutrients you need, so continue to eat 3-4 servings of dairy foods and 2-3 servings of meat/fish/poultry/nuts every day. Foods high in iron include: red meats, eggs, dark green vegetables, dark yellow vegetables, nuts, kidney beans and chickpeas. Some cereals are fortified with iron, so look at the food labels for 100% of the daily requirement for iron.     [place] for childbirth and parenting classes, including an infant CPR class. Breastfeeding classes are recommended too.    Plan for the gestational diabetes screening between weeks 24-28. You can eat normally before that visit; we would suggest making sure you have protein foods, but not a lot of carbohydrates or sugary foods.    Your blood type was determined at your first OB appointment. If you are Rh negative, you should discuss the need for a Rhogam shot with your midwife or physician.     If you had a  birth in the past, discuss a trial of labor with your midwife or physician. He or she may ask that you obtain your operative report from that  if you are wanting to have a vaginal birth after  () this time.     Think about the support you " have, and what help you can plan on from family and friends, after your baby is born. Many mothers and babies are ready to go home from the hospital within a few days. Your clinic staff is available to assist you and the [facility] staff is available to you after hours. Start preparing your other children for changes they'll experience with the new baby. Explore day care options.    You may find that you have new discomforts now, such as sleep problems or leg cramps. To access information that can help you ease these discomforts, you can refer to the Starting Out Right book or find it online at [email or web address] or [email or web address]    You can sign up for a weekly parenting e-mail that gives support, tips and advice from health care professionals that starts with pregnancy and continues through the toddler years. To register, go to [email or web address] at any time during your pregnancy.    Watch for the warning signs of premature labor:     Dull, low backache    Contractions of the uterus, menstrual-like cramps    Abdominal cramping with or without diarrhea    More pelvic pressure    Increase or change in vaginal discharge.     Remember that labor doesn't have to hurt. Never hesitate to call your midwife or physician or their staff at [facility]/OB at Phone: [phone] for any one of these warning signs - or if something just doesn't feel right. If it's after clinic hours, physician patients should call the [facility] at [phone] ([phone]); midwife patients should call their answering service at [phone].

## 2021-06-23 NOTE — PROGRESS NOTES
ID#: [id number] Agency: Language Line    Scheduled Follow Up Call Attempt 3:   Care Guide called patient.  If this patient is returning my call, please transfer to Brandon Martinez at ext [phone].  I have called this patient 3 times over the past two weeks and have been unsuccessful in reaching her.  This patient has also not returned any of my messages.  I will continue attempting to reach out to this patient in one month.  I will also check this patient's chart for upcoming appointments, ER reports that may contain a new phone number, or any other recent activity.    Upcoming Appointments:  1/18/19 at 8:30 with Ana Padilla  2/22/19 at 9:00 with Dr. Rae    Next Outreach: 2/15/19

## 2021-06-23 NOTE — PROGRESS NOTES
: Roble ID#: [id number] Agency: Language Line    The patient called the Care guide stating that her pharmacy is informing her that her medication is not covered by her insurance and she will have to pay out of pocket for her medication. The card that she brought to the Pharmacy that was paper, the pharmacy stated that it only covers her doctor costs, not her medication. The Care guide then went to talk to the [facility] and while checking [place] [employer]. While the Care guide was talking to the [facility] and the patient the 's call was dropped. The Care guide called Language Line back and the new  was Casi with an ID #: [id number]. The Care guide then called [facility] and they put the new insurance into the system with no problem. While talking to [facility] they stated that the medication that the patient was trying to  they stated that it was not on file. The Care guide then stated that she will try to talk to the doctor or the clinical assistant to see if the prescription can be sent again. While talking to the CA she stated that the prescription may need to be called into the pharmacy again. The Care guide will check to see if the prescription went through at a later date.     Upcoming Appointments:  2/22/19 at 9:00 with Dr. Rae    Next Outreach: 2/18/19

## 2021-06-23 NOTE — PROGRESS NOTES
Reviewed daughters dx of blindness in right eye and updated mom's chart.  Will have next baby see eye doctor soon in life.  Acne is getting bad- burning and itching and painful on face/neck.  rx for benzaclin given today and reviewed skin care safe in pregnancy.  Regular fetal movement daily.  No bleeding or d/c or lof.  Scheduled for 20 week anatomic survey.  Going to school and starting to make friends.

## 2021-06-23 NOTE — TELEPHONE ENCOUNTER
Fax received from [facility], they have started the Prior Authorization Process via Cover My Meds    CoverMyMeds Key: [id number]    Medication Name: Clindamycin-Benzoyl Peroxide    Insurance Plan: not provided on fax  PBM:   Patient ID: not provided on fax    Please complete the PA process

## 2021-06-24 NOTE — PROGRESS NOTES
: Reji ID#: [id number] Agency: Language Line    Scheduled Follow Up Call: Attempt 1  Care Guide called and left a message for the patient.  If the patient is returning my call, please transfer them to me, Brandon Martinez, at [phone].    Upcoming Appointments:  3/12/19 at 8:40 with Dr. Rae    Next Outreach: 3/6/19

## 2021-06-24 NOTE — PATIENT INSTRUCTIONS - HE
Patient Education   HEALTHY PREGNANCY CARE: 26-30 WEEKS PREGNANT    You are now in your last trimester of pregnancy. Your baby is growing rapidly, can open and close her eyelids, sometimes get hiccups, and you'll probably feel her moving around more often. Your baby has breathing movements and is gaining about one ounce each day. You may notice heartburn and leg cramps. Your back may ache as your body gets used to your baby's size and length.    Discuss your work situation with your midwife or physician as needed. If you stand for long periods of time, you may need to make changes and take breaks.    Pre-register online at the hospital where your baby will be born ([email or web address])     Be aware of your baby's activity level. You may be asked to do daily fetal movement counts. Contact your midwife or physician about any decreased movement.    You may have been tested for gestational diabetes today. If you are RH negative, you may have had an additional test and a Rhogam injection.    Consider receiving a Tdap vaccination to protect your baby from Pertussis/whooping cough.    If you are considering tubal ligation, discuss this with your midwife or physician. You will need to have an appointment with the obstetrician who will do the surgery to discuss the risks, benefits, and alternatives, and to sign the consent. This can be discussed at your next visit.    Continue to watch for any signs or symptoms of premature labor:     Regular contractions. This means having about 6 or more within 1 hour, even after you have had a glass of water and are resting.     A backache that starts and stops regularly.    An increase or change in vaginal discharge, such as heavy, mucus-like, watery, or bloody discharge.     Your water breaks or leaks.    Continue to watch for signs and symptoms of preeclampsia:     Sudden swelling of your face, hands, or feet     New vision  problems such as blurring, double vision, or flashing lights    A severe headache not relieved with acetaminophen (Tylenol)    Sharp or stabbing pain in your right or middle upper abdomen    If you have any of the above symptoms or any other concerns, call your midwife or physician or their clinic staff at [facility] FAMILY MEDICINE/OB at Phone: [phone]. If it's after clinic hours, physician patients should call the Care Connection at [phone] ([phone]); midwife patients should call their answering service at [phone].    How can you care for yourself at home?   You can refer to the Starting Out Right book or find it online at http://www.healthMakoo.org/images/stories/maternity/[facility]-Starting-Out-Right.pdf or http://www.healthMakoo.org/images/stories/flipbooks/[facility]-starting-out-right/[facility]-starting-out-right.html#p=8     You can sign up for a weekly parenting e-mail that gives support, tips and advice from health care professionals that starts with pregnancy and continues through the toddler years. To register, go to www.healthMakoo.[email or web address]/baby at any time during your pregnancy.      Baby Feeding in the Hospital: Information, Support and Resources    As you prepare for the birth of your child, you will want to consider options for feeding your baby including breast-feeding and/or baby formula. The American Academy of Pediatrics recommends exclusive breast-feeding for the first six months (although any amount of breast-feeding is beneficial).  However, we also understand that breast-feeding is a personal choice and not for everyone. Whether or not you choose to breast-feed, your decision will be respected by our staff.    There are numerous benefits of breast-feeding; here are a few to consider:    Provides antibodies to protect your baby from infections and diseases    The cost: formula can cost over $1,500 per year    Convenience, no warming up or sterilizing bottles and supplies    The  physical contact with breastfeeding can make babies feel secure, warm and comforted    What ever my feeding choice, what can I expect after I deliver my baby?    Your baby will usually be placed skin-to-skin immediately following birth. The skin to skin contact between you and your baby will be a special and memorable time. The bonding and attachment comforts your baby and has a positive effect on baby s brain development.     Having your baby  room in  with you also helps you start to learn your baby s body rhythms and sleep cycle.      You will also begin to learn your baby s cues (signals) that he or she is ready to feed.    When do I start to feed my baby?  As soon as possible after your baby s birth, you will be encouraged to begin feeding.  In the first couple of weeks, your baby will eat often.  Breastfeeding babies usually eat at least 8 times in 24 hours.  Babies fed formula usually eat at least 7 times in 24 hours.      Breast-feeding tips:    Get comfortable and use pillows for support.    Have your baby at the level of your breast, facing you,  tummy to tummy .      Touch your nipple to your baby s lips so you baby s mouth opens wide (rooting reflex).  Aim the nipple toward the roof of your baby s mouth. When your baby opens his or her mouth, pull your baby toward your breast to help your baby  latch on  to your nipple and much of the areola area.    Hand expressing your breast milk can assist with latching your baby to your breast, if needed.    Ask for help, breastfeeding may seem awkward or uncomfortable at first, this is normal. There are numerous resources available at [facility], Clinics and beyond.     If your goal is to exclusively breastfeed, avoid using any formula or artificial nipples (including bottles and pacifiers) while you are your baby are learning to breastfeed unless there is a medical reason.       Mixing breastfeeding and formula can interfere with how you begin building  your milk supply.  It can impact how you and your baby  learn  to breastfeeding together and alter the natural growth of  good  bacteria in your baby s stomach.    Delay a pacifier or a bottle in the first few weeks until breastfeeding is well established. This is often around 3 weeks of age.    Ask your nurse to show you how to hand express.   Breast milk can be kept in the refrigerator or freezer for later use.  (over)  Hospital Resources:  [facility] Lactation Clinics located at [facility], [facility] and [facility]  Call: [phone].    Inpatient support    Outpatient appointments    Telephone consultation    Breast-feeding classes available through [employer]      Online Resources:    [employer].org/baby sign up for free online weekly e-mail    [employer].org/maternity    Breastfeedingmadesimple.com    SnapLogic.Call Loop (La Leche League)    Normalfed.com    Womenshealth.gov/breastfeeding    Workandpump.com    Breast-feeding Supplies & Pumps:  Talk to your insurance provider or WIC (Women, Infants and Children) to learn more about options available to you. Recent health insurance changes may include additional coverage for supplies and pumps.    Public Health:  Women, Infants and Children Nutrition program (WIC): provides breast-feeding support and education in addition to formal feeding moms. 015-LRH-8032 or http://www.health.[facility].mn.us/divs/fh/wic    Family Health Home Visiting: Public [facility] Nurse home visits are available. Talk to your provider to see if you qualify. Most [facility] have a program available.    Additional Resources:  La Leche League is an international, nonprofit, nonsectarian organization offering information, education, and support to mothers who want to breast-feed their babies. Local groups offer phone help and monthly meetings. Visit hyperWALLET Systems.Call Loop or Geoforce.org and us the  Find local support  drop down menu or click on the  Resources   tab.    [place] Breastfeeding Resources: 4-566-622-BABY (0263) toll free    National Breastfeeding Help Line trained breastfeeding peer counselors can help answer common breast-feeding questions by phone. Monday-Friday: English/Bengali  2-968- [phone] toll free, [phone] (TTY)    [facility] Connection: [phone]-[facility] ([phone])

## 2021-06-24 NOTE — PROGRESS NOTES
Doing well- no concerns.  Regular fetal movement.  No cTXs.  No bleeding.  No LOF.  Starting to get some back pain that radiates down left leg.  Worse with sitting for a while at school- gets tight in the calves when sitting too long.  Sleeping well.

## 2021-06-24 NOTE — TELEPHONE ENCOUNTER
Please let pt know that her sugar test today was too high- this may be why her baby is getting bigger.     I would like her to come back and do a fasting 3 hour test later this week or next week to see if she has diabetes or not.      No anemia.

## 2021-06-24 NOTE — PROGRESS NOTES
Doing well.  Feeling lightheaded for a while now if she stands up or if she is standing too long.  Regular fetal movement.  Some ligament pain in lower groin bilaterally with movement.  Mild to mod low back pain- worse with caring for toddler.  No bleeding.  No LOF.

## 2021-06-24 NOTE — PATIENT INSTRUCTIONS - HE
Patient Education   HEALTHY PREGNANCY CARE: 26-30 WEEKS PREGNANT    You are now in your last trimester of pregnancy. Your baby is growing rapidly, can open and close her eyelids, sometimes get hiccups, and you'll probably feel her moving around more often. Your baby has breathing movements and is gaining about one ounce each day. You may notice heartburn and leg cramps. Your back may ache as your body gets used to your baby's size and length.    Discuss your work situation with your midwife or physician as needed. If you stand for long periods of time, you may need to make changes and take breaks.    Pre-register online at the hospital where your baby will be born ([email or web address])     Be aware of your baby's activity level. You may be asked to do daily fetal movement counts. Contact your midwife or physician about any decreased movement.    You may have been tested for gestational diabetes today. If you are RH negative, you may have had an additional test and a Rhogam injection.    Consider receiving a Tdap vaccination to protect your baby from Pertussis/whooping cough.    If you are considering tubal ligation, discuss this with your midwife or physician. You will need to have an appointment with the obstetrician who will do the surgery to discuss the risks, benefits, and alternatives, and to sign the consent. This can be discussed at your next visit.    Continue to watch for any signs or symptoms of premature labor:     Regular contractions. This means having about 6 or more within 1 hour, even after you have had a glass of water and are resting.     A backache that starts and stops regularly.    An increase or change in vaginal discharge, such as heavy, mucus-like, watery, or bloody discharge.     Your water breaks or leaks.    Continue to watch for signs and symptoms of preeclampsia:     Sudden swelling of your face, hands, or feet     New vision  problems such as blurring, double vision, or flashing lights    A severe headache not relieved with acetaminophen (Tylenol)    Sharp or stabbing pain in your right or middle upper abdomen    If you have any of the above symptoms or any other concerns, call your midwife or physician or their clinic staff at [facility] FAMILY MEDICINE/OB at Phone: [phone]. If it's after clinic hours, physician patients should call the Care Connection at [phone] ([phone]); midwife patients should call their answering service at [phone].    How can you care for yourself at home?   You can refer to the Starting Out Right book or find it online at http://www.healthANDalyze.org/images/stories/maternity/[facility]-Starting-Out-Right.pdf or http://www.healthANDalyze.org/images/stories/flipbooks/[facility]-starting-out-right/[facility]-starting-out-right.html#p=8     You can sign up for a weekly parenting e-mail that gives support, tips and advice from health care professionals that starts with pregnancy and continues through the toddler years. To register, go to www.healthANDalyze.[email or web address]/baby at any time during your pregnancy.      Baby Feeding in the Hospital: Information, Support and Resources    As you prepare for the birth of your child, you will want to consider options for feeding your baby including breast-feeding and/or baby formula. The American Academy of Pediatrics recommends exclusive breast-feeding for the first six months (although any amount of breast-feeding is beneficial).  However, we also understand that breast-feeding is a personal choice and not for everyone. Whether or not you choose to breast-feed, your decision will be respected by our staff.    There are numerous benefits of breast-feeding; here are a few to consider:    Provides antibodies to protect your baby from infections and diseases    The cost: formula can cost over $1,500 per year    Convenience, no warming up or sterilizing bottles and supplies    The  physical contact with breastfeeding can make babies feel secure, warm and comforted    What ever my feeding choice, what can I expect after I deliver my baby?    Your baby will usually be placed skin-to-skin immediately following birth. The skin to skin contact between you and your baby will be a special and memorable time. The bonding and attachment comforts your baby and has a positive effect on baby s brain development.     Having your baby  room in  with you also helps you start to learn your baby s body rhythms and sleep cycle.      You will also begin to learn your baby s cues (signals) that he or she is ready to feed.    When do I start to feed my baby?  As soon as possible after your baby s birth, you will be encouraged to begin feeding.  In the first couple of weeks, your baby will eat often.  Breastfeeding babies usually eat at least 8 times in 24 hours.  Babies fed formula usually eat at least 7 times in 24 hours.      Breast-feeding tips:    Get comfortable and use pillows for support.    Have your baby at the level of your breast, facing you,  tummy to tummy .      Touch your nipple to your baby s lips so you baby s mouth opens wide (rooting reflex).  Aim the nipple toward the roof of your baby s mouth. When your baby opens his or her mouth, pull your baby toward your breast to help your baby  latch on  to your nipple and much of the areola area.    Hand expressing your breast milk can assist with latching your baby to your breast, if needed.    Ask for help, breastfeeding may seem awkward or uncomfortable at first, this is normal. There are numerous resources available at [facility] Hospitals, Clinics and beyond.     If your goal is to exclusively breastfeed, avoid using any formula or artificial nipples (including bottles and pacifiers) while you are your baby are learning to breastfeed unless there is a medical reason.       Mixing breastfeeding and formula can interfere with how you begin building  your milk supply.  It can impact how you and your baby  learn  to breastfeeding together and alter the natural growth of  good  bacteria in your baby s stomach.    Delay a pacifier or a bottle in the first few weeks until breastfeeding is well established. This is often around 3 weeks of age.    Ask your nurse to show you how to hand express.   Breast milk can be kept in the refrigerator or freezer for later use.  (over)  Hospital Resources:  [facility] Lactation Clinics located at [facility], [facility] and [facility]  Call: [phone].    Inpatient support    Outpatient appointments    Telephone consultation    Breast-feeding classes available through [employer]      Online Resources:    [employer]ocket.org/baby sign up for free online weekly e-mail    Loaded Pocket.org/maternity    Breastfeedingmadesimple.com    [employer].Rebyoo (La Leche League)    Normalfed.com    Womenshealth.gov/breastfeeding    Workandpump.com    Breast-feeding Supplies & Pumps:  Talk to your insurance provider or WIC (Women, Infants and Children) to learn more about options available to you. Recent health insurance changes may include additional coverage for supplies and pumps.    Public Health:  Women, Infants and Children Nutrition program (WIC): provides breast-feeding support and education in addition to formal feeding moms. 166-CYG-8597 or http://www.health.[facility].[place].us/divs/fh/wic    Family Health Home Visiting: Public [facility] Nurse home visits are available. Talk to your provider to see if you qualify. Most [facility] have a program available.    Additional Resources:  La Leche League is an international, nonprofit, nonsectarian organization offering information, education, and support to mothers who want to breast-feed their babies. Local groups offer phone help and monthly meetings. Visit Buyapowa.Rebyoo or Chirpify.org and us the  Find local support  drop down menu or click on the  Resources   tab.    [place] Breastfeeding Resources: 8-795-145-BABY (5247) toll free    National Breastfeeding Help Line trained breastfeeding peer counselors can help answer common breast-feeding questions by phone. Monday-Friday: English/Maori  [phone]- [phone] toll free, [phone] (TTY)    [facility] Connection: [phone] ([phone])

## 2021-06-24 NOTE — PROGRESS NOTES
: Wyatt ID#: [id number] Agency: Language Line    First call the patient's  picked the phone, but was driving home and asked for the Care guide to call back in about a half hour.     The patient is doing well right now the only concern that she has is making sure that when she gives birth to her new born that she will be able to obtain insurance. She does not want to pursue SNAP/Cash, that goal was deleted. As for the medical bills she no longer has bills at this time, that goal was deleted. The next outreach was pushed out for 2 months.    Upcoming Appointments:  3/12/19 at 8:40 with Dr. Rae    Next Outreach: 5/9/19

## 2021-06-25 NOTE — TELEPHONE ENCOUNTER
Please let pt know that her 3hr test was okay.  Her 1hr test was high but the rest were okay.     She neds to be very careful with her diet the rest of the pregnancy so her baby does not get too big.    Lots of fruit/veggies and meat but cut back on rice, noodles, sweets, breads, etc.      Only drink water and milk- no juice or pop

## 2021-06-25 NOTE — PROGRESS NOTES
"Clinic Care Coordination Contact:    Community Health Worker Follow Up    Goals:   Goals Addressed                 This Visit's Progress      Psychosocial (pt-stated)   30%     Goal Statement: I want to apply for [place] Public Housing wait list within one month.    Date Goal set: 02/26/21  Barriers:   Strengths:   Date to Achieve By: 3/30/2021  Patient expressed understanding of goal: Yes  Action steps to achieve this goal:  1. I completed my application with [facility] SW for [place] PHA, I will let CHW know at next outreach if I have received any mail from [facility].  2. I will continue to wait to hear from [facility] office regarding to the housing application status.   3. I will updates [facility] office if have any change of address, phone number, and new additional member.   4. I and spouse will try to find any open/rental apartment/home available through the [employer].   5. I and spouse will go the the [employer] and [facility] [employer] to find opening housing that posted in the billboards.   6. I will connect with my CHW to assist call to find out housing available for rental if found any phone numbers.   7. I will updates CCC team on the status.     Note/FYI:   -Per [facility] encounter dated 3/02/2021: \"We were able to complete the application.   Status: Submitted  Reference #: [id number]  March 1, 2021 3:22PM.\"  -Patient reported on 4/28/2021; pregnant; estimated due date 9/26/2021.     (Updated: 6-)              Discussion: [facility] CHW was able to connect with the patient today regarding to goals follow up. Verified new goal, most and urgent needs. CHW suggested pt and spouse to start looking for opening housing through the [employer], [employer], and [employer] [place], may connect with CCC team at [phone] for any additional resources needs, and follow up with PCP office at [phone] for any questions or concerns. Patient confirmed understand and no other questions or concerns.     Patient " reported/shared:   -I and spouse will try to find any opening housing or apartment through the [facility], [facility], and [facility].   -I will connect with CCC team for update and any additional resources need.   -Doing well. Will continue to follow up with PCP. No other questions or concerns.     Patient concerns: Length of wait time to get an apartment through [employer] agency per pt. CHW refer pt to connect with PHA office or connect with CCC team to assist call if need. Pt confirmed will continue to wait.     CHW Next Follow-up: goals follow up    Outreach frequency: monthly     CHW Plan: 7-

## 2021-06-26 NOTE — PROGRESS NOTES
Shaila Savage is a 35 y.o. female who is being evaluated via a billable telephone visit.      What phone number would you like to be contacted at? [phone]  How would you like to obtain your AVS? AVS Preference: [employer].    [facility] PHONE Visit  Phone : Alfreda    Chief Complaint:  Chief Complaint   Patient presents with     routine ob       Assessment/Plan:  1. Encounter for supervision of other normal pregnancy in second trimester  Doing well.  No concerns today.  Will message CCC about ongoing housing issues.     Return in 4 weeks (on 2021) for prenatal care.      HPI:   Shaila Savage is a 35 y.o. female and presents to clinic today for the following health issues prenatal visit at 25 5/7 weeks .     Staying cool inside the house for hot weather.  AC working.  Drinking plenty of water.  No concerns today.  No pain or cramping.  No CTXs.  No headaches or swelling.      Working with CCC on housing options and has submitted all needed applications and just waiting at this point.      Reviewed normal us of spine from 21.      Social History     Social History Narrative    Lives with  Tiffany, 2 kids, and 's older son and his family .  9 people total.        Physical Exam:  Vitals from last visit reviewed.   Per pt report at home:      Patient's last menstrual period was 2020 (exact date).  Wt Readings from Last 3 Encounters:   21 135 lb 12 oz (61.6 kg)   21 127 lb (57.6 kg)   21 128 lb (58.1 kg)       No data recorded  No data recorded  PHQ-2 Total Score: 0 (2020  8:38 AM)    No data recorded    GENERAL: Patient sounds well and able to participate in history and planning without difficulty.     Phone call duration:  11 minutes    Selene Rae MD  [facility]

## 2021-06-26 NOTE — PROGRESS NOTES
Clinic Care Coordination Contact    Situation: Patient chart reviewed by care coordinator.    Background: SW completed chart review    Assessment: Patient working on finding housing    Plan/Recommendations: Standard outreach

## 2021-06-26 NOTE — TELEPHONE ENCOUNTER
----- Message from Selene Rae MD sent at 6/18/2021 10:03 AM CDT -----  Please help pt schedule ob visit with 1hr glucose test in 3-4 weeks.

## 2021-06-27 ENCOUNTER — HEALTH MAINTENANCE LETTER (OUTPATIENT)
Age: 36
End: 2021-06-27

## 2021-06-30 NOTE — PROGRESS NOTES
Progress Notes by Rey Malik LGSW at 2/26/2021  9:00 AM     Author: Rey Malik LGSW Service: -- Author Type:     Filed: 2/26/2021  3:56 PM Encounter Date: 2/26/2021 Status: Signed    : Rey Malik LGSW ()       Clinic Care Coordination Contact  CCC SARATH contacted Shaila with an  to complete an assessment for enrollment into [facility].    SARATH assisted Shaila with creating an account to apply with [facility]. The application is complete except we need her 's social security number. She will talk with him about this. SARATH will call later today to see if she has it.       Clinic Care Coordination Contact  OUTREACH    Referral Information:  Referral Source: PCP         Chief Complaint   Patient presents with   ? Clinic Care Coordination - Initial        Universal Utilization:   Clinic Utilization  Difficulty keeping appointments:: No  Compliance Concerns: No  No-Show Concerns: No  No PCP office visit in Past Year: No  Utilization    Last refreshed: 2/26/2021  9:58 AM: Hospital Admissions 1           Last refreshed: 2/26/2021  9:58 AM: ED Visits 1           Last refreshed: 2/26/2021  9:58 AM: No Show Count (past year) 2              Current as of: 2/26/2021  9:58 AM              Clinical Concerns:  Current Medical Concerns:  None reported    Current Behavioral Concerns: None reported    Education Provided to patient: Role of CCC   Pain  Pain (GOAL):: No  Health Maintenance Reviewed: Not assessed  Clinical Pathway: None     Medication Management:  No concerns     Functional Status:  Dependent ADLs:: Independent  Dependent IADLs:: Independent  Bed or wheelchair confined:: No  Mobility Status: Independent  Fallen 2 or more times in the past year?: No  Any fall with injury in the past year?: No    Living Situation:  Current living arrangement:: I live in a private home with family  Type of residence:: Private home - stairs    Lifestyle &  Psychosocial Needs:  Lifestyle   ? Physical activity     Days per week: 5 days     Minutes per session: 20 min   ? Stress: To some extent     Social Needs   ? Financial resource strain: Somewhat hard   ? Food insecurity     Worry: Sometimes true     Inability: Sometimes true   ? Transportation needs     Medical: No     Non-medical: No     Diet:: Regular  Inadequate nutrition (GOAL):: No  Tube Feeding: No  Inadequate activity/exercise (GOAL):: No  Significant changes in sleep pattern (GOAL): No  Transportation means:: Family, Regular car     Faith or spiritual beliefs that impact treatment:: No  Mental health DX:: No  Mental health management concern (GOAL):: No  Informal Support system:: Family, Spouse   Socioeconomic History   ? Marital status:      Spouse name: Tiffany   ? Number of children: 2   ? Years of education: Not on file   ? Highest education level: Not on file   Occupational History   ? Occupation: homemaker   Relationships   ? Social connections     Talks on phone: More than three times a week     Gets together: More than three times a week     Attends Buddhist service: Never     Active member of club or organization: No     Attends meetings of clubs or organizations: Never     Relationship status:    ? Intimate partner violence     Fear of current or ex partner: No     Emotionally abused: No     Physically abused: No     Forced sexual activity: No     Tobacco Use   ? Smoking status: Never Smoker   ? Smokeless tobacco: Never Used   ? Tobacco comment: no exposure   Substance and Sexual Activity   ? Alcohol use: No     Frequency: Never     Binge frequency: Never   ? Drug use: No   ? Sexual activity: Yes     Partners: Male     Birth control/protection: None                Resources and Interventions:  Current Resources:      Community Resources: Other (see comment)(Not ready to apply at this time for this pregnancy per patient reported.)  Supplies Currently Used at Home: None  Equipment  Currently Used at Home: none  Type of Employment: Unemployed       Advance Care Plan/Directive  Advanced Care Plans/Directives on file:: No  Advanced Care Plan/Directive Status: Not Applicable          Goals:   Goals        General    Psychosocial (pt-stated)     Notes - Note created  2/26/2021 10:21 AM by Rey Malik LGSW    Goal Statement: I want to apply for [facility] wait list within one month  Date Goal set: 02/26/21  Barriers:   Strengths:   Date to Achieve By: 3/30/2021  Patient expressed understanding of goal: Yes  Action steps to achieve this goal:  1. I will talk with my  about the process and get his Social Security number to complete the application                Patient/Caregiver understanding: Reported understanding    Outreach Frequency: monthly  Future Appointments              In 4 weeks Selene Rae MD [facility]          Plan: SW will call later today to see if we can complete housing application

## 2021-07-03 NOTE — ADDENDUM NOTE
Addendum Note by Sandra Vital LPN at 5/23/2019  9:00 AM     Author: Sandra Vital LPN Service: -- Author Type: Licensed Nurse    Filed: 5/23/2019 10:36 AM Encounter Date: 5/23/2019 Status: Signed    : Sandra Vital LPN (Licensed Nurse)    Addended by: SANDRA VITAL on: 5/23/2019 10:36 AM        Modules accepted: Orders

## 2021-07-04 NOTE — PATIENT INSTRUCTIONS - HE
Patient Instructions by Selene Rae MD at 6/18/2021  9:20 AM     Author: Selene Rae MD Service: -- Author Type: Physician    Filed: 6/18/2021 10:01 AM Encounter Date: 6/18/2021 Status: Signed    : Selene Rae MD (Physician)         Patient Education   HEALTHY PREGNANCY CARE: 22-26 WEEKS PREGNANT    You are finishing your second trimester. Your baby is developing rapidly. At this stage, babies have a sense of balance, can respond to touch, and are recognizing parent voices.  Your baby will be moving around more now.  You may notice [place] contractions now, which are painless and prepare the uterus for the delivery.    Nutrition: During this time, you may find that your nausea and fatigue are gone. Overall, you may feel better and have more energy than you did in your first trimester. Be sure you are getting enough calcium and iron in your diet. Your prenatal vitamins cannot supply all of the nutrients you need, so continue to eat 3-4 servings of dairy foods and 2-3 servings of meat/fish/poultry/nuts every day. Foods high in iron include: red meats, eggs, dark green vegetables, dark yellow vegetables, nuts, kidney beans and chickpeas. Some cereals are fortified with iron, so look at the food labels for 100% of the daily requirement for iron.     Discuss your work situation with your midwife or physician as needed. If you stand for long periods of time, you may need to make changes and take breaks.    [place] for childbirth and parenting classes, including an infant CPR class. Breastfeeding classes are recommended too.    Plan for the gestational diabetes screening between weeks 24-28. You can eat normally before that visit; we would suggest making sure you have protein foods, but not a lot of carbohydrates or sugary foods.    Your blood type was determined at your first OB appointment. If you are Rh negative, you should discuss the need for a Rhogam shot with your midwife or  "physician. This would be administered around 28 weeks if necessary.    How can you care for yourself at home?   You can refer to the Starting Out Right book or find it online at [email or web address]-[email or web address] or [email or web address]/[email or web address]-[facility]-[email or web address]-starting-out-[email or web address]#[email or web address]     You can sign up for a weekly parenting e-mail that gives support, tips and advice from health care professionals that starts with pregnancy and continues through the toddler years. To register, go to [email or web address] at any time during your pregnancy.    Watch for the warning signs of premature labor:   \" Dull, low backache  \" Contractions of the uterus, menstrual-like cramps  \" Abdominal cramping with or without diarrhea  \" More pelvic pressure  \" Increase or change in vaginal discharge.     Continue to watch for signs and symptoms of preeclampsia:   \" Sudden swelling of your face, hands, or feet   \" New vision problems such as blurring, double vision, or flashing lights  \" A severe headache not relieved with acetaminophen (Tylenol)  \" Sharp or stabbing pain in your right or middle upper abdomen        Remember that labor doesn't have to hurt. Never hesitate to call your midwife or physician or their staff at [facility] at Phone: [phone] for any one of these warning signs - or if something just doesn't feel right. If it's after clinic hours, physician patients should call the [facility] at [phone] ([phone]); midwife patients should call their answering service at [phone].    BREASTFEEDING TIPS FOR NEW MOMS     Importance of skin-to-skin contact:  ? Gets breastfeeding off to a good start  ? Keeps baby warm and is great for bonding  ? Provides calming effects and helps to stabilize breathing and  blood sugar  ? Helps the uterus to contract and bleed less    Importance of feeding " "whenever baby shows signs of  being hungry, \"feeding on cue\":  ? Helps create a good milk supply appropriate to the babys needs  ? Less breastfeeding complications such as engorgement or  low supply  ? Helps baby get enough milk  ? Milk supply is determined by how often baby nurses and empties  the breast.  ? Feeding is for comfort as well as nutrition    Importance of good latch (positioning and attaching  baby properly at breast):  ? Helps prevent sore nipples  ? Helps ensure baby gets enough milk and supports moms breast  milk supply    Risks of giving baby supplements other  than moms breastmilk  Breastfeeding alone is recommended for the first 6 months, if not it:  ? Can make baby more prone to illness  ? Can make baby less satisfied at breast (wanting larger amounts or  faster flow)  ? Reduces milk supply    Importance of rooming-in:  ? Increases parent confidence while mother is supported by the  hospital staff  ? Caregivers learn how to care for baby and recognize feeding cues  ? Enables feeding whenever baby needs to eat  ? Baby is comforted with mom and learns to recognize caregivers    Avoiding use of pacifiers:  ? Use of pacifiers in the first weeks can make it difficult to make a full  milk supply  ? May interfere with baby learning to latch well      [facility] [place] [id number]. [place] [id number]. DOD 11.17         Breastfeeding   Why Its Important and What to Expect     Your breast milk is the best food for your baby--and  breastfeeding can help you be healthy as well.    Though breastfeeding is natural, it is a learned process for both mother and baby. To prepare, there are things you can learn--and do--before your baby is born.    ? Learn the benefits of breastfeeding.    ? Understand the basic process.    ? Know what to expect in the hospital.    ? Arrange breastfeeding support for the first few  weeks after birth.    ? Take a breastfeeding class (see back page).    ? Talk to your midwife, nurse or doctor if you " have  Questions.    The benefits of breastfeeding    Human milk changes to meet the needs of a growing baby. It is all a baby needs for the first six months of life.    In fact, babies who receive only human milk for the  first six months are less likely to develop colds, the  flu, colic, asthma, ear infections, food allergies and  diarrhea (loose, watery stools). They may be less likely      to be overweight as children, and they are less likely to develop diabetes later in life. Some studies also show that infants have a higher IQ if they are .    Breastfeeding can:    ? Help you and your baby develop a special bond--  and make you feel proud that you can feed your  Baby.    ? Reduce the total amount of blood you will lose  after delivery.    ? Help your uterus return to its non-pregnant size.    ? Reduce the risk of Sudden Infant Death Syndrome (SIDS).    ? Help you lose your pregnancy weight more quickly.    ? Help delay the return of your monthly periods.    ? Lower your risk of some breast and ovarian  cancers--as well as osteoporosis (bone loss)--later in life.    ? Save you more than $300 per month. (This  includes the cost of formula and medical bills.  Formula-fed babies get sick more often.)          If you are deaf or hard of hearing, please let us know. We provide many free services including  sign language interpreters, oral interpreters, TTYs, telephone amplifiers, note takers and written materials.        How to breastfeed    Skin-to-skin contact    Hold your baby on your chest skin-to-skin right after  birth. Skin contact calms your baby, steadies their  breathing and keeps your baby warm. Your baby will be alert and will likely want to feed within the first hour after birth.    Babies are born with reflexes that help them  breastfeed. Your body will be ready with early milk  (called colostrum), so you will have all the milk your  baby needs for that first feeding. Your nurse will help you  get started.    Keep your baby with you and breastfeed whenever  your baby is hungry. Offering the breast early and  often helps your body keep making lots of milk.    How to position your baby    There are many positions for breastfeeding.              No matter which position you choose, support your  babys back, shoulders and neck. The head and body should be in a straight line, and the entire body should face the breast. Your baby should be able to tilt the head back easily. Your baby shouldnt have to reach out to feed. Also make sure your babys nose is level with your nipple. This way, your baby will find it easier to attach to your breast.    Finally, get comfortable. Use pillows to support your  body. Dont lean over or slump to reach your baby.  Once your baby is attached to the breast, its okay to change your position slightly.    You will feel a bit of a tugging at first, but you should  never feel pain. If you do, ask your nurse or lactation expert for help. She will teach you how to latch your baby onto the breast in a way that feels more comfortable.    Breastfeeding in the hospital    For the first three days after birth, your body will  produce early milk called colostrum. This milk is  full of calories and antibodies to help keep your baby healthy. It is all your baby needs for the first few days.    Remember, babies do not eat anything while inside  you. Right after birth, your baby will only need a little bit of milk (about 1 teaspoon per feeding) to get the digestive system working well. Your baby will not need any formula--your body will make the right amount of milk.    Breastfeed whenever your baby shows signs of hunger. (See next page for a list of signs.) Crying is a late sign of hunger.    Babies often lose weight in the days after birth. This  is normal. By two weeks of age, your baby should be back to their birth weight. Your care team will watch your babys weight carefully.    If you are  unable to breastfeed in the hospital, your  care team may suggest pasteurized donor human milk for your baby.               The Most Important Points to Remember    ? Your breast milk is the perfect food for your  baby. Breastfeeding has a lot of health benefits  for you as well.    ? Hold your baby skin-to-skin as soon as possible  after birth. Do this for as long as you can. Even  if your baby doesnt go to the breast right away,  skin-to-skin contact helps your body make  more milk. This lets you get an early start on  Breastfeeding.    ? Learn how to position your baby at the breast.  This will help your baby feed well, and it will  keep you comfortable.    ? Feed your baby whenever your baby wants to  eat. You are feeding a baby, not a clock!    ? Signs that your baby is ready to eat include:  starting to wake up, chewing on fists, moving  the face from side to side, opening and closing  the mouth, sticking out the tongue and turning  toward the breast when held. Crying is a late  sign of hunger, so look for the earlier signals  that your baby makes.    ? Feed your baby only human milk for at least  six months. The World Health Organization  recommends breastfeeding for the first year,  noting it is a eliazar baby who is  for  the first two years.    ? While in the hospital, plan to keep your baby  with you at all times, except for certain medical  procedures. This is an important time for you  and your baby to get to know each other and  practice breastfeeding.     Common questions    Below are questions that many women have about  breastfeeding. You will find further information in the  childbirth book your care team gave you. If you have more questions, speak with your midwife, nurse or doctor.    How do I involve my partner, family and  friends and get their help and support?    Sometimes family and friends dont understand why  you want to breastfeed. Perhaps they themselves  didnt breastfeed, or they  werent  as infants. Tell them about the benefits of breastfeeding and how important it is to feed only human milk for the first six months or so. If you feed often, you will make plenty of milk to help your baby grow, fight illness and get the best start possible.    After two to four weeks--once your baby is feeding  well and your milk supply is well established--your  partner and others can feed the baby your milk from  a cup, dropper or bottle. You can remove milk from  your breast (by hand or pump) and store it for later  use. This way, your baby will have your milk even  when youre away.    Remind everyone that there are lots of ways to help  that dont involve feeding: making meals, caring for  your other children, comforting the baby if they cries, changing diapers, running errands and more.    Is breastfeeding painful?    Not usually. There are ways to prevent pain and to  treat it if it happens.    The best ways to avoid pain are to feed your baby  often, use a good position and correctly latch your  baby onto the breast. These help prevent the two  most common sources of pain: sore nipples and  engorgement (overly full breasts). You will learn more about these topics in a breastfeeding class and in the hospital after your baby is born.         How much time does it take to breastfeed?    Some new mothers feel that all they do is breastfeed. In the early weeks, a baby eats 8 to 12 times per day. Sometimes babies will cluster feedings close together. At other times, there are longer stretches between feedings.    Remember, your newborns stomach will be about  the size of a walnut. Your baby wont eat much at each feeding. If you feed your baby often in the first days, your baby--and your milk supply--will grow. Your baby will eat more at each session, and you will need to nurse less often. Within a few weeks, most women find that breastfeeding is easier and takes less time than formula feeding.    How  will I know if my baby is getting  enough milk?    Your body will start making milk as soon as your  baby is born. The more often you put your baby to  breast, the more milk you will make. You should avoid pacifiers for the first several weeks until breastfeeding is well established--you want your baby to do all their sucking at the breast. This will help you make more milk.    There are signs that your baby is getting enough milk. You will learn these signs over time. For example:    ? You will count wet and soiled diapers, because  these show how much milk your baby is getting.    ? Your baby will seem satisfied after feedings.    ? Your baby will grow and gain weight after the first  few days.    Are there reasons why a woman shouldnt  breastfeed her baby?    There are a few medical concerns that prevent  breastfeeding. In mothers, these include being HIVpositive, having active or untreated TB (tuberculosis)  and using street drugs or some medicines. These  mothers usually choose infant formula for their  Babies.    A few women choose not to breastfeed for personal  reasons. But most mothers can breastfeed. If you are not sure if its okay to breastfeed, ask your care team.    Getting support    Before your baby is born, get as much information  as you can. Sign up for a breastfeeding class. Most  childbirth classes discuss breastfeeding, but a special class will give more in-depth information.    ? In the [facility]: Go to [facility] at [employer].    ? In [facility]: Go to www.[employer].  Click on Classes-and Events at the bottom of the  page, then search for breastfeeding. Or, call [phone].    Remember, help is available from your hospital, clinic, lactation experts or online. If you need help after leaving the hospital:    ? Call your babys clinic. (If you dont have a clinic,  call [phone] and ask for a referral.)    ? Call a lactation consultant. (If you need  help  finding a location that offers lactation support, call  [phone].)    ? Call [employer] (24 hours a  day) at 4-134-KF-LECHE [[phone]].    ? Call the Women, Infants and Children (WIC)  program at 1-584.951.1656.    ? Call the National Womens Health Information  Center (English and Latvian) at 1-721.493.5821 or  go to www.womenshealth.gov/breastfeeding.    ? Go to [employer].       For informational purposes only. Not to replace the advice of your health care provider. Copyright   2010 [facility]. All rights reserved. Clinically reviewed by [facility]. [employer] [id number] - REV 06/18.

## 2021-07-05 ENCOUNTER — DOCUMENTATION ONLY (OUTPATIENT)
Dept: ADMINISTRATIVE | Facility: OTHER | Age: 36
End: 2021-07-05

## 2021-07-05 NOTE — PROGRESS NOTES
This encounter was created as part of manual pregnancy episode data conversion for the single EHR project. The following information (where applicable) was manually abstracted from the [employer] Epic instance on July 5, 2021: pregnancy episode name/date, dating, episode encounter linking, pregravid weight, number of fetuses, and pregnancy overview and plan.     Cecelia Jefferson RN   Clinical Informatics

## 2021-07-06 VITALS
TEMPERATURE: 98.3 F | HEART RATE: 77 BPM | WEIGHT: 135.75 LBS | BODY MASS INDEX: 28.37 KG/M2 | SYSTOLIC BLOOD PRESSURE: 104 MMHG | DIASTOLIC BLOOD PRESSURE: 65 MMHG

## 2021-07-12 ENCOUNTER — TELEPHONE (OUTPATIENT)
Dept: FAMILY MEDICINE | Facility: CLINIC | Age: 36
End: 2021-07-12

## 2021-07-12 NOTE — TELEPHONE ENCOUNTER
Sandra Richmond,    Patient stated that she has been trying to get a hold of you and would like for you to call back asap in regards to housing. Thanks.

## 2021-07-14 PROBLEM — Z34.90 PREGNANT: Status: RESOLVED | Noted: 2019-05-25 | Resolved: 2019-05-25

## 2021-07-14 PROBLEM — Z34.90 PREGNANT: Status: RESOLVED | Noted: 2017-12-07 | Resolved: 2017-12-08

## 2021-07-14 PROBLEM — Z34.90 SUPERVISION OF NORMAL PREGNANCY: Status: RESOLVED | Noted: 2018-10-12 | Resolved: 2019-08-06

## 2021-07-14 PROBLEM — O20.0 THREATENED MISCARRIAGE: Status: RESOLVED | Noted: 2020-10-21 | Resolved: 2021-02-12

## 2021-07-14 PROBLEM — B97.7 HIGH RISK HPV INFECTION: Status: RESOLVED | Noted: 2018-12-26 | Resolved: 2019-08-14

## 2021-07-14 PROBLEM — O20.0 THREATENED MISCARRIAGE: Status: RESOLVED | Noted: 2019-10-08 | Resolved: 2020-09-30

## 2021-07-14 PROBLEM — Z34.02 NORMAL FIRST PREGNANCY CONFIRMED, CURRENTLY IN SECOND TRIMESTER: Status: RESOLVED | Noted: 2017-07-28 | Resolved: 2018-10-12

## 2021-07-14 PROBLEM — O99.820 GROUP B STREPTOCOCCAL CARRIAGE COMPLICATING PREGNANCY: Status: RESOLVED | Noted: 2017-11-22 | Resolved: 2018-10-12

## 2021-07-19 ENCOUNTER — PATIENT OUTREACH (OUTPATIENT)
Dept: NURSING | Facility: CLINIC | Age: 36
End: 2021-07-19

## 2021-07-19 NOTE — PROGRESS NOTES
CCC SW attempted to reach patient with an . Phone was silent and not ringing. We were able to leave a voicemail with my call back number.

## 2021-08-05 ENCOUNTER — PATIENT OUTREACH (OUTPATIENT)
Dept: NURSING | Facility: CLINIC | Age: 36
End: 2021-08-05

## 2021-08-05 NOTE — PROGRESS NOTES
Clinic Care Coordination Contact    Community Health Worker Follow Up    Goals:     Goals Addressed                    This Visit's Progress       COMPLETED: Psychosocial (pt-stated)   100%      Goal Statement: I will apply for [place] [facility] wait list within one month.     Date Goal set: 02/26/21   Barriers: Language  Strengths: Accepting of help  Date to Achieve By: 3/30/2021   Patient expressed understanding of goal: Yes     Personal Plan:   1. I completed my application with [facility] for [place] [facility] and now understand that it can take years for my application to come to the top of the list.  2. I will monitor my incoming mail and if I receive anything fromt [facility] I will make sure to contact them right away.  3. My  and I will continue to pursue other affordable housing options as we can.    [place] [facility] Application Reference #: [id number]   March 1, 2021 3:22PM                CHW Outreach Conversation:    Shaila has completed her housing goal as she has applied for [facility]. She understands that the waiting list can take many years before her application comes to the top of the list. She states she understands.    Shaila reports that she moved to a new address but she is not able to read the address on her mail. She states that she is still living in [place]. She reports her daughter helps her make the [employer] premium payments online but recently she has not been able to make this online payments.    CHW informed her daughter to call [facility] at [phone] to follow up on the [employer] premium payment concerns. She states she will have her daughter call [facility].      Care Gaps Addressed:    Preventative Care Visit - Postponed to March 2022.    COVID Vaccine - Will discuss with doctor since she is pregnant. Next appt is 8/20/2021.    Advanced Care Planning - Declined          CHW Next Follow-up: 2 months    CHW Plan: Routing to the [facility] to review for Maintenance, if  approved, the CHW will outreach in 2 months to review for new goals or Graduation.

## 2021-08-06 ENCOUNTER — PATIENT OUTREACH (OUTPATIENT)
Dept: CARE COORDINATION | Facility: CLINIC | Age: 36
End: 2021-08-06

## 2021-08-06 NOTE — PROGRESS NOTES
Clinic Care Coordination Contact    Situation: Patient chart reviewed by carecoordinator.    Background: CHW messaged SW for maintenance review    Assessment: Patient completed goals    Plan/Recommendations: SW moved patient to maintenance, SW will chart review in 45 days, CHW to [facility] outreach

## 2021-08-09 NOTE — PROGRESS NOTES
"Clinic Care Coordination Contact:    8/9/2021:  CHW received message below from [facility] SW/[facility] Lead Care Coordinator.  \"Patient has been moved to maintenance, please do standard outreach. Thank you!.\"  Please see [facility] SW notes encounter 8- for details if need.   CHW next outreach moved to 2 months.     CHW Next Follow-up: verify new goal/need. If none, discuss moving patient towards [facility] graduation.      Outreach frequency: monthly      CHW Plan: 1-        "

## 2021-08-20 ENCOUNTER — PRENATAL OFFICE VISIT (OUTPATIENT)
Dept: FAMILY MEDICINE | Facility: CLINIC | Age: 36
End: 2021-08-20
Payer: COMMERCIAL

## 2021-08-20 VITALS
BODY MASS INDEX: 29.47 KG/M2 | HEART RATE: 87 BPM | WEIGHT: 141 LBS | SYSTOLIC BLOOD PRESSURE: 119 MMHG | DIASTOLIC BLOOD PRESSURE: 82 MMHG | RESPIRATION RATE: 18 BRPM

## 2021-08-20 DIAGNOSIS — Z34.83 ENCOUNTER FOR SUPERVISION OF OTHER NORMAL PREGNANCY IN THIRD TRIMESTER: Primary | ICD-10-CM

## 2021-08-20 LAB
ALBUMIN UR-MCNC: NEGATIVE MG/DL
ERYTHROCYTE [DISTWIDTH] IN BLOOD BY AUTOMATED COUNT: 13 % (ref 10–15)
FERRITIN SERPL-MCNC: 6 NG/ML (ref 10–130)
GLUCOSE 1H P 50 G GLC PO SERPL-MCNC: 181 MG/DL (ref 70–129)
GLUCOSE UR STRIP-MCNC: NEGATIVE MG/DL
HCT VFR BLD AUTO: 38.2 % (ref 35–47)
HGB BLD-MCNC: 12 G/DL (ref 11.7–15.7)
KETONES UR STRIP-MCNC: NEGATIVE MG/DL
MCH RBC QN AUTO: 25.4 PG (ref 26.5–33)
MCHC RBC AUTO-ENTMCNC: 31.4 G/DL (ref 31.5–36.5)
MCV RBC AUTO: 81 FL (ref 78–100)
PLATELET # BLD AUTO: 199 10E3/UL (ref 150–450)
RBC # BLD AUTO: 4.72 10E6/UL (ref 3.8–5.2)
WBC # BLD AUTO: 7.8 10E3/UL (ref 4–11)

## 2021-08-20 PROCEDURE — 81003 URINALYSIS AUTO W/O SCOPE: CPT | Performed by: FAMILY MEDICINE

## 2021-08-20 PROCEDURE — 86780 TREPONEMA PALLIDUM: CPT | Performed by: FAMILY MEDICINE

## 2021-08-20 PROCEDURE — 36415 COLL VENOUS BLD VENIPUNCTURE: CPT | Performed by: FAMILY MEDICINE

## 2021-08-20 PROCEDURE — 90471 IMMUNIZATION ADMIN: CPT | Performed by: FAMILY MEDICINE

## 2021-08-20 PROCEDURE — 90715 TDAP VACCINE 7 YRS/> IM: CPT | Performed by: FAMILY MEDICINE

## 2021-08-20 PROCEDURE — 82728 ASSAY OF FERRITIN: CPT | Performed by: FAMILY MEDICINE

## 2021-08-20 PROCEDURE — 99207 PR PRENATAL VISIT: CPT | Performed by: FAMILY MEDICINE

## 2021-08-20 PROCEDURE — 85027 COMPLETE CBC AUTOMATED: CPT | Performed by: FAMILY MEDICINE

## 2021-08-20 PROCEDURE — 82952 GTT-ADDED SAMPLES: CPT | Performed by: FAMILY MEDICINE

## 2021-08-20 NOTE — PROGRESS NOTES
1hr gtt, etc today. Reviewed recommendations and scheduled pt for fasting 3hr GTT.      adacel today.    Continues to decline covid vaccine until after delivery despite increased safety data    Concerns: none  Doing well.  No concerns today.  No vaginal bleeding, LOF.  No contractions.  Failed 1 hour GTT- discussed need for 3 hour GTT.  Discussed kick counts and fetal movement.  Reportable signs and symptoms discussed.  Discussed kick counts and fetal movement.  Discussed PTL, PROM, and when to call or come in.  Noting leg cramping at night.    RTC 2 weeks.    Selene Rae MD

## 2021-08-21 LAB — T PALLIDUM AB SER QL: NEGATIVE

## 2021-08-27 ENCOUNTER — TELEPHONE (OUTPATIENT)
Dept: FAMILY MEDICINE | Facility: CLINIC | Age: 36
End: 2021-08-27

## 2021-08-27 ENCOUNTER — LAB (OUTPATIENT)
Dept: LAB | Facility: CLINIC | Age: 36
End: 2021-08-27
Payer: COMMERCIAL

## 2021-08-27 ENCOUNTER — APPOINTMENT (OUTPATIENT)
Dept: INTERPRETER SERVICES | Facility: CLINIC | Age: 36
End: 2021-08-27
Payer: COMMERCIAL

## 2021-08-27 DIAGNOSIS — Z34.83 ENCOUNTER FOR SUPERVISION OF NORMAL PREGNANCY IN MULTIGRAVIDA IN THIRD TRIMESTER: Primary | ICD-10-CM

## 2021-08-27 PROBLEM — O24.410 DIET CONTROLLED GESTATIONAL DIABETES MELLITUS (GDM) IN THIRD TRIMESTER: Status: ACTIVE | Noted: 2021-08-27

## 2021-08-27 LAB
GESTATIONAL GTT 1 HR POST DOSE: 199 MG/DL (ref 60–179)
GESTATIONAL GTT 2 HR POST DOSE: 154 MG/DL (ref 60–154)
GESTATIONAL GTT 3 HR POST DOSE: 125 MG/DL (ref 60–139)
GLUCOSE P FAST SERPL-MCNC: 95 MG/DL (ref 60–94)

## 2021-08-27 PROCEDURE — 36415 COLL VENOUS BLD VENIPUNCTURE: CPT

## 2021-08-27 PROCEDURE — 82952 GTT-ADDED SAMPLES: CPT

## 2021-08-27 PROCEDURE — 82951 GLUCOSE TOLERANCE TEST (GTT): CPT

## 2021-08-27 NOTE — TELEPHONE ENCOUNTER
----- Message from Selene Rae MD sent at 8/27/2021 12:29 PM CDT -----  Team - please call patient with results.    SHE DOES HAVE diabetes in pregnancy.   I will have the diabetes team call her and help her next

## 2021-08-27 NOTE — RESULT ENCOUNTER NOTE
Team - please call patient with results.    SHE DOES HAVE diabetes in pregnancy.   I will have the diabetes team call her and help her next

## 2021-09-02 ENCOUNTER — PRENATAL OFFICE VISIT (OUTPATIENT)
Dept: FAMILY MEDICINE | Facility: CLINIC | Age: 36
End: 2021-09-02
Payer: COMMERCIAL

## 2021-09-02 VITALS
BODY MASS INDEX: 30.1 KG/M2 | DIASTOLIC BLOOD PRESSURE: 72 MMHG | WEIGHT: 144 LBS | HEART RATE: 73 BPM | SYSTOLIC BLOOD PRESSURE: 108 MMHG

## 2021-09-02 DIAGNOSIS — O09.93 SUPERVISION OF HIGH RISK PREGNANCY IN THIRD TRIMESTER: Primary | ICD-10-CM

## 2021-09-02 DIAGNOSIS — O24.410 DIET CONTROLLED GESTATIONAL DIABETES MELLITUS (GDM) IN THIRD TRIMESTER: ICD-10-CM

## 2021-09-02 PROCEDURE — 81003 URINALYSIS AUTO W/O SCOPE: CPT | Performed by: FAMILY MEDICINE

## 2021-09-02 PROCEDURE — 87653 STREP B DNA AMP PROBE: CPT | Performed by: FAMILY MEDICINE

## 2021-09-02 PROCEDURE — 99207 PR PRENATAL VISIT: CPT | Performed by: FAMILY MEDICINE

## 2021-09-02 RX ORDER — GLUCOSAMINE HCL/CHONDROITIN SU 500-400 MG
CAPSULE ORAL
Qty: 100 EACH | Refills: 3 | Status: SHIPPED | OUTPATIENT
Start: 2021-09-02 | End: 2023-12-14

## 2021-09-02 RX ORDER — FERROUS GLUCONATE 324(38)MG
324 TABLET ORAL
Qty: 100 TABLET | Refills: 4 | Status: SHIPPED | OUTPATIENT
Start: 2021-09-02 | End: 2023-12-14

## 2021-09-02 RX ORDER — PRENATAL VIT/IRON FUM/FOLIC AC 27MG-0.8MG
1 TABLET ORAL DAILY
Qty: 90 TABLET | Refills: 3 | Status: SHIPPED | OUTPATIENT
Start: 2021-09-02 | End: 2023-12-14

## 2021-09-02 RX ORDER — LANCETS
EACH MISCELLANEOUS
Qty: 50 EACH | Refills: 0 | Status: SHIPPED | OUTPATIENT
Start: 2021-09-02 | End: 2021-09-15

## 2021-09-02 NOTE — PROGRESS NOTES
RN spoke with patient accompanied by [facility] , [place]. RN discussed about Gestational Diabetes via [facility] pamphlets and handouts.     Instructions on how to perform blood sugar checks were demonstrated on BS samples. RN also taught patient about BS goals.    Fasting BS < 95  1 hour post eating < 140    RN also wrote BS goal in front of pamphlet per patient request. Patient asked about being able to eat more green vegetables and cutting down on rice. RN confirmed that option is fine, and pointed out different dietary options for patient via dietary handout.    Dr. Rae will order BS items to patient's pharmacy today.    Patient encouraged to ask Pharmacist to demonstrate usage of BS items if needed.    Patient verbalized understanding of education and agree with plan.    PRASAD Palacios, RN   [facility]

## 2021-09-02 NOTE — PROGRESS NOTES
Reviewed new dx of GDM from 8/27/21.  Has 1st visit with diabetes education 9/8/21 scheduled. Was able to meet with clinic RN today to review dietary change basics and glucose monitoring.      Pt doesn't like milk- wondering if she has to drink.    Ran out of PNV and iron.     Concerns: diabetes in pregnancy.  Baby feeling bigger/tighter than her other babies.  Starting to get back pain that she usually gets before labor starts. Stomach gets tight and pelvic pressure but not labor pains.   No vaginal bleeding, LOF, contractions.  No HA, RUQ pain, N/V, visual changes.  Discussed indications, risks, complications and failure rate of inductions.  Discussed signs of labor and when to call or come in.  Discussed kick counts and fetal movement.  Biophysical profile ordered.  Reportable signs and symptoms discussed.  GBS done today.  Labor precautions discussed.  RTC 1 week.  Prenatal flowsheet information is reviewed.    Selene Rae MD

## 2021-09-03 LAB
GP B STREP DNA SPEC QL NAA+PROBE: NEGATIVE
PATIENT PENICILLIN, AMOXICILLIN, CEPHALOSPORINS ALLERGY: NO

## 2021-09-08 ENCOUNTER — PATIENT OUTREACH (OUTPATIENT)
Dept: EDUCATION SERVICES | Facility: CLINIC | Age: 36
End: 2021-09-08
Attending: FAMILY MEDICINE
Payer: COMMERCIAL

## 2021-09-08 DIAGNOSIS — O24.410 DIET CONTROLLED GESTATIONAL DIABETES MELLITUS (GDM) IN THIRD TRIMESTER: ICD-10-CM

## 2021-09-08 NOTE — PROGRESS NOTES
Diabetes Self-Management Education & Support      SUBJECTIVE/OBJECTIVE:  Presents for education related to gestational diabetes.    Accompanied by: Self  Gestational weeks: 37 weeks    due [id number]  Hospital planned for delivery: [facility]  Next OB Visit Date: 09/13/21  Number of previous preganancies: 2  Had any babies over 9 lbs: No  Do you use tobacco products?: No  Do you drink beer, wine or hard liquor?: No    Current weight:  141 lb (reports)    Estimated Date of Delivery: Sep 26, 2021    OGTT:    95/ 154/ 199/ 125      ASSESSMEnt:     Shaila picked up glucose testing strips and lancets but does not have glucose meter.   She indicated insurance was not covering it.  Called pharmacy and her meter is ready to be picked up at no cost- recommended she  meter today and get started testing.    INTERVENTION:  Patient was instructed on Accu-Chek  Meter.  Reviewed over the phone.     Educational topics covered today:  GDM diagnosis, pathophysiology, Risks and Complications of GDM, Means of controlling GDM, Using a Blood Glucose Monitor, Blood Glucose Goals, Logging and Interpreting Glucose Results, Ketone Testing, When to Call a Diabetes Educator or OB Provider, Healthy Eating During Pregnancy, Counting Carbohydrates, Meal Planning for GDM, and Physical Activity    Pt verbalized understanding of concepts discussed and recommendations provided today.     PLAN:  Check glucose 4 times daily, before breakfast and 1 hour after each meal.     Check Ketones daily for one week, if negative, reduce testing to once a week.     Physical activity recommended: as able    Meal plan: 15-30 carbs at breakfast, 45-60 carbs at lunch & dinner-- 3 snacks a day.  Follow consistent CHO meal plan, eat CHO and protein/fat at all meals/snacks.    Call/e-mail/[facility] message diabetes educator if 3 or more blood sugars are above the goal in 1 week, if ketones are positive, or with questions/concerns.      Time Spent: 60 minutes  Encounter  Type: Individual    Any diabetes medication dose changes were made via the CDE Protocol and Collaborative Practice Agreement with the patient's OB/GYN provider. A copy of this encounter was shared with the provider.    I agree with the aforementioned diabetes plan.  Natacha Horner NP  [facility] End[facility]  9/8/2021  11:45 AM

## 2021-09-09 ENCOUNTER — HOSPITAL ENCOUNTER (OUTPATIENT)
Dept: ULTRASOUND IMAGING | Facility: HOSPITAL | Age: 36
Discharge: HOME | End: 2021-09-09
Attending: FAMILY MEDICINE | Admitting: FAMILY MEDICINE
Payer: COMMERCIAL

## 2021-09-09 ENCOUNTER — HOSPITAL ENCOUNTER (OUTPATIENT)
Facility: HOSPITAL | Age: 36
Discharge: HOME | End: 2021-09-09
Attending: FAMILY MEDICINE | Admitting: FAMILY MEDICINE
Payer: COMMERCIAL

## 2021-09-09 DIAGNOSIS — O09.93 SUPERVISION OF HIGH RISK PREGNANCY IN THIRD TRIMESTER: ICD-10-CM

## 2021-09-09 PROCEDURE — 76819 FETAL BIOPHYS PROFIL W/O NST: CPT

## 2021-09-09 PROCEDURE — G0463 HOSPITAL OUTPT CLINIC VISIT: HCPCS

## 2021-09-09 PROCEDURE — 76816 OB US FOLLOW-UP PER FETUS: CPT

## 2021-09-09 NOTE — PROGRESS NOTES
Shaila is here for NST after BPP. She states she hasn't had anything to eat yet this morning. I gave her some apple juice and placed her on her left side.

## 2021-09-13 ENCOUNTER — APPOINTMENT (OUTPATIENT)
Dept: INTERPRETER SERVICES | Facility: CLINIC | Age: 36
End: 2021-09-13
Payer: COMMERCIAL

## 2021-09-14 ENCOUNTER — VIRTUAL VISIT (OUTPATIENT)
Dept: EDUCATION SERVICES | Facility: CLINIC | Age: 36
End: 2021-09-14
Payer: COMMERCIAL

## 2021-09-14 DIAGNOSIS — O24.410 DIET CONTROLLED GESTATIONAL DIABETES MELLITUS (GDM) IN THIRD TRIMESTER: ICD-10-CM

## 2021-09-14 DIAGNOSIS — O24.410 DIET CONTROLLED GESTATIONAL DIABETES MELLITUS (GDM) IN THIRD TRIMESTER: Primary | ICD-10-CM

## 2021-09-14 PROCEDURE — G0108 DIAB MANAGE TRN  PER INDIV: HCPCS | Performed by: DIETITIAN, REGISTERED

## 2021-09-14 NOTE — PROGRESS NOTES
Diabetes Self-Management Education & Support    Professional  used via phone.   SUBJECTIVE/OBJECTIVE:  Presents for education related to gestational diabetes.    Shaila has not started testing BG yet, she had car problems and unable to get to pharmacy  -  She did  strips and lancets but not meter..    She lives in [place]; [facility]  This was done today,; I called [place] pharmacy to confirm meter was ready for pickup.  This was confirmed with Shaila. She agreed to  today and start testing.      Accompanied by: Self  Gestational weeks: 38 weeks    due   Next OB Visit Date: 21  Number of previous preganancies: 2  Had any babies over 9 lbs: No  Do you use tobacco products?: No  Do you drink beer, wine or hard liquor?: No    Cultural Influences/Ethnic Background:  Not  or   Estimated Date of Delivery: Sep 26, 2021   Lifestyle and Health Behaviors:  Meals include: Breakfast, Lunch, Dinner  Breakfast: 2 slices bread/ eggs  Lunch: meat, rice, vegetables  Dinner: vegetables & squash,   meat  Snacks: apple,  Beverages: Water  How many servings of fruits/vegetables per day: 4  Pre- vitamin?: Yes  Assessment:   She did report she was eating 3 times daily and including snacks.  She is eating small portions of rice..  PLAN:  Emphasized importance of starting to check B times daily.  Reviewed BG goals.    Check ketones once a week when readings are consistently negative.  Continue with recommended physical activity.  Continue to follow recommended meal plan: 15-30 carbs at breakfast, 45-60 carbs at lunch and dinner:  15-30 carbs at snacks + protein  Follow consistent CHO meal plan, eat CHO and protein/fat at all meals/snacks.    Call/e-mail/MyChart message diabetes educator if 3 or more blood sugars are above the goal in 1 week or if ketones are positive.      Time Spent: 30 minutes  Encounter Type:  Individual    Any diabetes medication dose changes were made via the CDE Protocol and Collaborative Practice Agreement with the patient's endocrinology provider. A copy of this encounter was shared with the provider.

## 2021-09-14 NOTE — LETTER
2021         RE: Shaila Savage  [address] [place] [address]        Dear Colleague,    Thank you for referring your patient, Shaila Savage, to the [facility]. Please see a copy of my visit note below.    Diabetes Self-Management Education & Support    Professional  used via phone.   SUBJECTIVE/OBJECTIVE:  Presents for education related to gestational diabetes.    Shaila has not started testing BG yet, she had car problems and unable to get to pharmacy  -  She did  strips and lancets but not meter..    She lives in [place]; she asked that the prescription be transferred to [facility].  This was done today,; I called [place] pharmacy to confirm meter was ready for pickup.  This was confirmed with Shaila. She agreed to  today and start testing.      Accompanied by: Self  Gestational weeks: 38 weeks    due   Next OB Visit Date: 21  Number of previous preganancies: 2  Had any babies over 9 lbs: No  Do you use tobacco products?: No  Do you drink beer, wine or hard liquor?: No    Cultural Influences/Ethnic Background:  Not  or   Estimated Date of Delivery: Sep 26, 2021   Lifestyle and Health Behaviors:  Meals include: Breakfast, Lunch, Dinner  Breakfast: 2 slices bread/ eggs  Lunch: meat, rice, vegetables  Dinner: vegetables & squash,   meat  Snacks: apple,  Beverages: Water  How many servings of fruits/vegetables per day: 4  Pre-bina vitamin?: Yes  Assessment:   She did report she was eating 3 times daily and including snacks.  She is eating small portions of rice..  PLAN:  Emphasized importance of starting to check B times daily.  Reviewed BG goals.    Check ketones once a week when readings are consistently negative.  Continue with recommended physical activity.  Continue to follow recommended meal plan: 15-30 carbs at breakfast, 45-60 carbs at lunch and dinner:  15-30 carbs at snacks + protein  Follow consistent  CHO meal plan, eat CHO and protein/fat at all meals/snacks.    Call/e-mail/MyChart message diabetes educator if 3 or more blood sugars are above the goal in 1 week or if ketones are positive.      Time Spent: 30 minutes  Encounter Type: Individual    Any diabetes medication dose changes were made via the CDE Protocol and Collaborative Practice Agreement with the patient's endocrinology provider. A copy of this encounter was shared with the provider.

## 2021-09-15 RX ORDER — LANCETS
EACH MISCELLANEOUS
Qty: 100 EACH | Refills: 1 | Status: SHIPPED | OUTPATIENT
Start: 2021-09-15 | End: 2023-12-14

## 2021-09-15 NOTE — TELEPHONE ENCOUNTER
"thin (NO BRAND SPECIFIED) lancets   0 ordered  Edit       Summary: Use with lanceting device. To accompany: Blood Glucose Monitor Brands: per insurance., Disp-50 each, R-0, E-Prescribe     Start: 9/2/2021    Ord/Sold: 9/2/2021 (O)      Report    Adh:     Taking:     Long-term:       Pharmacy: [id number] - [place], [place] - [address] AT [facility] [address]    Med Dose History         Patient Sig: Use with lanceting device. To accompany: Blood Glucose Monitor Brands: per insurance.       Ordered on: 9/2/2021       Authorized by: ASTER RAE       Dispense: 50 each       Admin Instructions: Use with lanceting device. To accompany: Blood Glucose Monitor Brands: per insurance.        Last Written Prescription Date:  09/2/2021  Last Fill Quantity: 50,  # refills: 0   Last office visit provider:   09/02/2021 with Dr Rae.    Requested Prescriptions   Pending Prescriptions Disp Refills     blood glucose monitoring (SOFTCLIX) lancets [Pharmacy Med Name: SOFTCLIX LANCETS]       Sig: USE WITH LANCETING DEVICE       Diabetic Supplies Protocol Passed - 9/14/2021 12:44 PM        Passed - Medication is active on med list        Passed - Patient is 18 years of age or older        Passed - Recent (6 mo) or future (30 days) visit within the authorizing provider's specialty     Patient had office visit in the last 6 months or has a visit in the next 30 days with authorizing provider.  See \"Patient Info\" tab in inbasket, or \"Choose Columns\" in Meds & Orders section of the refill encounter.                 Evie Martin 09/15/21 8:03 AM  "

## 2021-09-20 ENCOUNTER — OFFICE VISIT (OUTPATIENT)
Dept: FAMILY MEDICINE | Facility: CLINIC | Age: 36
End: 2021-09-20
Payer: COMMERCIAL

## 2021-09-20 VITALS
BODY MASS INDEX: 30.51 KG/M2 | DIASTOLIC BLOOD PRESSURE: 71 MMHG | HEART RATE: 74 BPM | SYSTOLIC BLOOD PRESSURE: 109 MMHG | WEIGHT: 146 LBS

## 2021-09-20 DIAGNOSIS — O09.93 SUPERVISION OF HIGH RISK PREGNANCY IN THIRD TRIMESTER: Primary | ICD-10-CM

## 2021-09-20 DIAGNOSIS — O24.410 DIET CONTROLLED GESTATIONAL DIABETES MELLITUS (GDM) IN THIRD TRIMESTER: ICD-10-CM

## 2021-09-20 PROCEDURE — 99207 PR PRENATAL VISIT: CPT | Performed by: FAMILY MEDICINE

## 2021-09-20 PROCEDURE — U0005 INFEC AGEN DETEC AMPLI PROBE: HCPCS | Performed by: FAMILY MEDICINE

## 2021-09-20 PROCEDURE — U0003 INFECTIOUS AGENT DETECTION BY NUCLEIC ACID (DNA OR RNA); SEVERE ACUTE RESPIRATORY SYNDROME CORONAVIRUS 2 (SARS-COV-2) (CORONAVIRUS DISEASE [COVID-19]), AMPLIFIED PROBE TECHNIQUE, MAKING USE OF HIGH THROUGHPUT TECHNOLOGIES AS DESCRIBED BY CMS-2020-01-R: HCPCS | Performed by: FAMILY MEDICINE

## 2021-09-20 PROCEDURE — 81003 URINALYSIS AUTO W/O SCOPE: CPT | Performed by: FAMILY MEDICINE

## 2021-09-20 RX ORDER — BLOOD-GLUCOSE METER
EACH MISCELLANEOUS
COMMUNITY
Start: 2021-09-14 | End: 2023-12-14

## 2021-09-20 NOTE — PROGRESS NOTES
Met with diabetes education on the phone x1 and didn't have meter yet so this was sent to [facility] pharmacy.  Did get this and has started checking her sugars.      Did have BPP 9/9/21 but not last week because their car broke down.      9/20 fasting 95  9/19 fasting 88, bedtime 133   9/18 1am 115 (ate meal when her  got home from work and checked sugars 30min after eating), 1pm 91, 11pm 112  9/17 1am 131 fasting 103, 1hr , 7pm 89, 8pm 144   9/16 1am 113, 9am 91, 2pm 133   9/15  1pm 92 and 115, 3pm 150, 8pm 81    Noting some tingling and tightness in fingers and feet that is uncomfortable.      Ready for induction.      No vaginal bleeding, LOF, contractions.  No HA, RUQ pain, N/V, visual changes.  Discussed indications, risks, complications and failure rate of inductions.  Discussed kick counts and fetal movement.  NST done today and was reactive.  Reportable signs and symptoms discussed.  Labor precautions discussed.    Plan for induction 9/23/21 for diet controlled GDM     Selene Rae MD

## 2021-09-21 ENCOUNTER — VIRTUAL VISIT (OUTPATIENT)
Dept: EDUCATION SERVICES | Facility: CLINIC | Age: 36
End: 2021-09-21
Payer: COMMERCIAL

## 2021-09-21 DIAGNOSIS — O24.410 DIET CONTROLLED GESTATIONAL DIABETES MELLITUS (GDM) IN THIRD TRIMESTER: Primary | ICD-10-CM

## 2021-09-21 LAB — SARS-COV-2 RNA RESP QL NAA+PROBE: NEGATIVE

## 2021-09-21 PROCEDURE — G0108 DIAB MANAGE TRN  PER INDIV: HCPCS | Performed by: DIETITIAN, REGISTERED

## 2021-09-21 NOTE — LETTER
9/21/2021         RE: Shaila Savage  [address] [place] [id number]        Dear Colleague,    Thank you for referring your patient, Shaila Savage, to the [facility]. Please see a copy of my visit note below.    Diabetes Self-Management Education & Support    SUBJECTIVE/OBJECTIVE:  Presents for education related to gestational diabetes.    Accompanied by: Self  Diabetes management related comments/concerns: Induction scheduled this week on  9/23/21  Gestational weeks: Due date:  9/27/21  Hospital planned for delivery: [facility]  Number of previous preganancies: 2  Had any babies over 9 lbs: No  Do you use tobacco products?: No  Do you drink beer, wine or hard liquor?: No    Cultural Influences/Ethnic Background:  Not  or       LMP 12/20/2020 (Exact Date)     Weight gain 144 lbs.  Estimated Date of Delivery: Sep 26, 2021    FBS:  95, 88, 91, 103, 85 mg/dl  1 hr post meals:  144 (white rice), 130, 112, 133, 95    Hasn't been checking after eating toast in the morning.     INTERVENTION:  Educational topics covered today:  What to expect after delivery, Future testing for Type 2 diabetes (2 hour OGTT at 6 week post-partum check-up and annual fasting blood glucose level), Risk of GDM and planning ahead for future pregnancies, Recommended lifestyle interventions for reducing the risk of Type 2 Diabetes, When to Call a Diabetes Educator or OB Provider  PLAN:  Check glucose 4 times daily.    Continue to follow recommended meal plan: 30 carbs at breakfast, 15-30  carbs at lunch and dinner, 15-30 carbs at snacks.  Follow consistent CHO meal plan, eat CHO and protein/fat at all meals/snacks.      Time Spent: 30 minutes  Encounter Type: Individual    Any diabetes medication dose changes were made via the CDE Protocol and Collaborative Practice Agreement with the patient's endocrinology provider. A copy of this encounter was shared with the provider.

## 2021-09-21 NOTE — PROGRESS NOTES
Diabetes Self-Management Education & Support    SUBJECTIVE/OBJECTIVE:  Presents for education related to gestational diabetes.    Accompanied by: Self  Diabetes management related comments/concerns: Induction scheduled this week on  9/23/21  Gestational weeks: Due date:  9/27/21  Hospital planned for delivery: [facility]  Number of previous preganancies: 2  Had any babies over 9 lbs: No  Do you use tobacco products?: No  Do you drink beer, wine or hard liquor?: No    Cultural Influences/Ethnic Background:  Not  or       LMP 12/20/2020 (Exact Date)     Weight gain 144 lbs.  Estimated Date of Delivery: Sep 26, 2021    FBS:  95, 88, 91, 103, 85 mg/dl  1 hr post meals:  144 (white rice), 130, 112, 133, 95    Hasn't been checking after eating toast in the morning.     INTERVENTION:  Educational topics covered today:  What to expect after delivery, Future testing for Type 2 diabetes (2 hour OGTT at 6 week post-partum check-up and annual fasting blood glucose level), Risk of GDM and planning ahead for future pregnancies, Recommended lifestyle interventions for reducing the risk of Type 2 Diabetes, When to Call a Diabetes Educator or OB Provider  PLAN:  Check glucose 4 times daily.    Continue to follow recommended meal plan: 30 carbs at breakfast, 15-30  carbs at lunch and dinner, 15-30 carbs at snacks.  Follow consistent CHO meal plan, eat CHO and protein/fat at all meals/snacks.      Time Spent: 30 minutes  Encounter Type: Individual    Any diabetes medication dose changes were made via the CDE Protocol and Collaborative Practice Agreement with the patient's endocrinology provider. A copy of this encounter was shared with the provider.    I agree with the aforementioned diabetes plan.  Natacha Horner NP  [facility] Endocrinology  9/21/2021  11:38 AM

## 2021-09-21 NOTE — PROGRESS NOTES
Fetal Non-Stress Test  [facility]  Performed on 9/21/2021 at 39w2d.    Indication: GDM- undetermined control at this point.  Seems to be well controlled with diet alone    FHT:  Baseline: 135 bpm, Variability: Moderate (6 - 25 bpm), Accelerations: Present and Decelerations: Absent  CONTRACTIONS:  none    Assessment/Plan  Category 1, reactive and reassuring      Selene Rae MD

## 2021-09-23 ENCOUNTER — HOSPITAL ENCOUNTER (INPATIENT)
Facility: HOSPITAL | Age: 36
LOS: 2 days | Discharge: HOME | End: 2021-09-25
Attending: FAMILY MEDICINE | Admitting: FAMILY MEDICINE
Payer: COMMERCIAL

## 2021-09-23 DIAGNOSIS — E66.09 CLASS 1 OBESITY DUE TO EXCESS CALORIES WITHOUT SERIOUS COMORBIDITY WITH BODY MASS INDEX (BMI) OF 32.0 TO 32.9 IN ADULT: ICD-10-CM

## 2021-09-23 DIAGNOSIS — E66.811 CLASS 1 OBESITY DUE TO EXCESS CALORIES WITHOUT SERIOUS COMORBIDITY WITH BODY MASS INDEX (BMI) OF 32.0 TO 32.9 IN ADULT: ICD-10-CM

## 2021-09-23 DIAGNOSIS — O24.410 DIET CONTROLLED GESTATIONAL DIABETES MELLITUS (GDM) IN THIRD TRIMESTER: Primary | ICD-10-CM

## 2021-09-23 LAB
ABO/RH(D): NORMAL
ANTIBODY SCREEN: NEGATIVE
GLUCOSE BLDC GLUCOMTR-MCNC: 107 MG/DL (ref 70–99)
HBA1C MFR BLD: 5.3 %
HOLD SPECIMEN: NORMAL
SPECIMEN EXPIRATION DATE: NORMAL

## 2021-09-23 PROCEDURE — 10907ZC DRAINAGE OF AMNIOTIC FLUID, THERAPEUTIC FROM PRODUCTS OF CONCEPTION, VIA NATURAL OR ARTIFICIAL OPENING: ICD-10-PCS | Performed by: FAMILY MEDICINE

## 2021-09-23 PROCEDURE — 722N000001 HC LABOR CARE VAGINAL DELIVERY SINGLE

## 2021-09-23 PROCEDURE — 36415 COLL VENOUS BLD VENIPUNCTURE: CPT | Performed by: FAMILY MEDICINE

## 2021-09-23 PROCEDURE — 0KQM0ZZ REPAIR PERINEUM MUSCLE, OPEN APPROACH: ICD-10-PCS | Performed by: FAMILY MEDICINE

## 2021-09-23 PROCEDURE — 3E033VJ INTRODUCTION OF OTHER HORMONE INTO PERIPHERAL VEIN, PERCUTANEOUS APPROACH: ICD-10-PCS | Performed by: FAMILY MEDICINE

## 2021-09-23 PROCEDURE — 120N000001 HC R&B MED SURG/OB

## 2021-09-23 PROCEDURE — 59400 OBSTETRICAL CARE: CPT | Performed by: FAMILY MEDICINE

## 2021-09-23 PROCEDURE — 83036 HEMOGLOBIN GLYCOSYLATED A1C: CPT | Performed by: FAMILY MEDICINE

## 2021-09-23 PROCEDURE — 86900 BLOOD TYPING SEROLOGIC ABO: CPT | Performed by: FAMILY MEDICINE

## 2021-09-23 PROCEDURE — 250N000009 HC RX 250: Performed by: FAMILY MEDICINE

## 2021-09-23 PROCEDURE — 258N000003 HC RX IP 258 OP 636: Performed by: FAMILY MEDICINE

## 2021-09-23 PROCEDURE — 999N000127 HC STATISTIC PERIPHERAL IV START W US GUIDANCE

## 2021-09-23 RX ORDER — ONDANSETRON 4 MG/1
4 TABLET, ORALLY DISINTEGRATING ORAL EVERY 6 HOURS PRN
Status: DISCONTINUED | OUTPATIENT
Start: 2021-09-23 | End: 2021-09-24 | Stop reason: HOSPADM

## 2021-09-23 RX ORDER — OXYTOCIN 10 [USP'U]/ML
10 INJECTION, SOLUTION INTRAMUSCULAR; INTRAVENOUS
Status: DISCONTINUED | OUTPATIENT
Start: 2021-09-23 | End: 2021-09-24 | Stop reason: HOSPADM

## 2021-09-23 RX ORDER — KETOROLAC TROMETHAMINE 30 MG/ML
30 INJECTION, SOLUTION INTRAMUSCULAR; INTRAVENOUS
Status: DISCONTINUED | OUTPATIENT
Start: 2021-09-23 | End: 2021-09-25 | Stop reason: HOSPADM

## 2021-09-23 RX ORDER — METHYLERGONOVINE MALEATE 0.2 MG/ML
200 INJECTION INTRAVENOUS
Status: DISCONTINUED | OUTPATIENT
Start: 2021-09-23 | End: 2021-09-24 | Stop reason: HOSPADM

## 2021-09-23 RX ORDER — METOCLOPRAMIDE HYDROCHLORIDE 5 MG/ML
10 INJECTION INTRAMUSCULAR; INTRAVENOUS EVERY 6 HOURS PRN
Status: DISCONTINUED | OUTPATIENT
Start: 2021-09-23 | End: 2021-09-24 | Stop reason: HOSPADM

## 2021-09-23 RX ORDER — OXYTOCIN 10 [USP'U]/ML
10 INJECTION, SOLUTION INTRAMUSCULAR; INTRAVENOUS
Status: DISCONTINUED | OUTPATIENT
Start: 2021-09-23 | End: 2021-09-25 | Stop reason: HOSPADM

## 2021-09-23 RX ORDER — OXYTOCIN/0.9 % SODIUM CHLORIDE 30/500 ML
100-340 PLASTIC BAG, INJECTION (ML) INTRAVENOUS CONTINUOUS PRN
Status: DISCONTINUED | OUTPATIENT
Start: 2021-09-23 | End: 2021-09-25 | Stop reason: HOSPADM

## 2021-09-23 RX ORDER — CARBOPROST TROMETHAMINE 250 UG/ML
250 INJECTION, SOLUTION INTRAMUSCULAR
Status: DISCONTINUED | OUTPATIENT
Start: 2021-09-23 | End: 2021-09-24 | Stop reason: HOSPADM

## 2021-09-23 RX ORDER — NALOXONE HYDROCHLORIDE 0.4 MG/ML
0.2 INJECTION, SOLUTION INTRAMUSCULAR; INTRAVENOUS; SUBCUTANEOUS
Status: DISCONTINUED | OUTPATIENT
Start: 2021-09-23 | End: 2021-09-24 | Stop reason: HOSPADM

## 2021-09-23 RX ORDER — DEXTROSE MONOHYDRATE 25 G/50ML
25-50 INJECTION, SOLUTION INTRAVENOUS
Status: DISCONTINUED | OUTPATIENT
Start: 2021-09-23 | End: 2021-09-23 | Stop reason: HOSPADM

## 2021-09-23 RX ORDER — METOCLOPRAMIDE 10 MG/1
10 TABLET ORAL EVERY 6 HOURS PRN
Status: DISCONTINUED | OUTPATIENT
Start: 2021-09-23 | End: 2021-09-24 | Stop reason: HOSPADM

## 2021-09-23 RX ORDER — OXYTOCIN/0.9 % SODIUM CHLORIDE 30/500 ML
1-24 PLASTIC BAG, INJECTION (ML) INTRAVENOUS CONTINUOUS
Status: DISCONTINUED | OUTPATIENT
Start: 2021-09-23 | End: 2021-09-24 | Stop reason: HOSPADM

## 2021-09-23 RX ORDER — ONDANSETRON 2 MG/ML
4 INJECTION INTRAMUSCULAR; INTRAVENOUS EVERY 6 HOURS PRN
Status: DISCONTINUED | OUTPATIENT
Start: 2021-09-23 | End: 2021-09-24 | Stop reason: HOSPADM

## 2021-09-23 RX ORDER — NICOTINE POLACRILEX 4 MG
15-30 LOZENGE BUCCAL
Status: DISCONTINUED | OUTPATIENT
Start: 2021-09-23 | End: 2021-09-24 | Stop reason: HOSPADM

## 2021-09-23 RX ORDER — LIDOCAINE 40 MG/G
CREAM TOPICAL
Status: DISCONTINUED | OUTPATIENT
Start: 2021-09-23 | End: 2021-09-24 | Stop reason: HOSPADM

## 2021-09-23 RX ORDER — OXYTOCIN/0.9 % SODIUM CHLORIDE 30/500 ML
340 PLASTIC BAG, INJECTION (ML) INTRAVENOUS CONTINUOUS PRN
Status: DISCONTINUED | OUTPATIENT
Start: 2021-09-23 | End: 2021-09-24 | Stop reason: HOSPADM

## 2021-09-23 RX ORDER — PROCHLORPERAZINE MALEATE 10 MG
10 TABLET ORAL EVERY 6 HOURS PRN
Status: DISCONTINUED | OUTPATIENT
Start: 2021-09-23 | End: 2021-09-24 | Stop reason: HOSPADM

## 2021-09-23 RX ORDER — NALOXONE HYDROCHLORIDE 0.4 MG/ML
0.4 INJECTION, SOLUTION INTRAMUSCULAR; INTRAVENOUS; SUBCUTANEOUS
Status: DISCONTINUED | OUTPATIENT
Start: 2021-09-23 | End: 2021-09-24 | Stop reason: HOSPADM

## 2021-09-23 RX ORDER — MISOPROSTOL 200 UG/1
800 TABLET ORAL
Status: DISCONTINUED | OUTPATIENT
Start: 2021-09-23 | End: 2021-09-24 | Stop reason: HOSPADM

## 2021-09-23 RX ORDER — MISOPROSTOL 200 UG/1
400 TABLET ORAL
Status: DISCONTINUED | OUTPATIENT
Start: 2021-09-23 | End: 2021-09-24 | Stop reason: HOSPADM

## 2021-09-23 RX ORDER — IBUPROFEN 600 MG/1
600 TABLET, FILM COATED ORAL
Status: DISCONTINUED | OUTPATIENT
Start: 2021-09-23 | End: 2021-09-25 | Stop reason: HOSPADM

## 2021-09-23 RX ORDER — DEXTROSE MONOHYDRATE 25 G/50ML
25-50 INJECTION, SOLUTION INTRAVENOUS
Status: DISCONTINUED | OUTPATIENT
Start: 2021-09-23 | End: 2021-09-24 | Stop reason: HOSPADM

## 2021-09-23 RX ORDER — NICOTINE POLACRILEX 4 MG
15-30 LOZENGE BUCCAL
Status: DISCONTINUED | OUTPATIENT
Start: 2021-09-23 | End: 2021-09-23 | Stop reason: HOSPADM

## 2021-09-23 RX ORDER — PROCHLORPERAZINE 25 MG
25 SUPPOSITORY, RECTAL RECTAL EVERY 12 HOURS PRN
Status: DISCONTINUED | OUTPATIENT
Start: 2021-09-23 | End: 2021-09-24 | Stop reason: HOSPADM

## 2021-09-23 RX ORDER — SODIUM CHLORIDE, SODIUM LACTATE, POTASSIUM CHLORIDE, CALCIUM CHLORIDE 600; 310; 30; 20 MG/100ML; MG/100ML; MG/100ML; MG/100ML
INJECTION, SOLUTION INTRAVENOUS CONTINUOUS
Status: DISCONTINUED | OUTPATIENT
Start: 2021-09-23 | End: 2021-09-24 | Stop reason: HOSPADM

## 2021-09-23 RX ADMIN — SODIUM CHLORIDE, POTASSIUM CHLORIDE, SODIUM LACTATE AND CALCIUM CHLORIDE 500 ML: 600; 310; 30; 20 INJECTION, SOLUTION INTRAVENOUS at 22:35

## 2021-09-23 RX ADMIN — SODIUM CHLORIDE, POTASSIUM CHLORIDE, SODIUM LACTATE AND CALCIUM CHLORIDE 125 ML/HR: 600; 310; 30; 20 INJECTION, SOLUTION INTRAVENOUS at 16:21

## 2021-09-23 RX ADMIN — Medication 2 MILLI-UNITS/MIN: at 16:23

## 2021-09-23 NOTE — H&P
Maternal Admission H&P  [facility]aternity Care  Date of Admission: 2021  Date of Service: 2021    Name      Shaila Savage         1985  MRN       [id number]  PCP        Selene Rae at [facility], [phone].    ________________________________________________________________________    Assessment and Plan:  35 year old  at 39w4d.    Baby AGA. Anticipate .    Group B strep: neg    Late dx GDM- diet controlled   ________________________________________________________________________    Chief Complaint: Induction of labor    HPI: Shaila Savage is a 35 year old woman at 39w4d, based on LMP and 9 week us with Estimated Date of Delivery: Sep 26, 2021. Patient presents to L&D with plans for induction.        Prenatal Course:  She presented to [facility] at 9 weeks gestation for regular prenatal care.  Total weight gain 8.165 kg (18 lb).    Prenatal complications included 1hr GTT delayed due to virtual prenatal care and diagnosed mid 3rd trimester.  Diet controlled with ongoing hyperglycemia at 39 weeks.    Microcytosis related to alpha thal trait.      Vaccinations in pregnancy included adacel- needs flu and covid vaccines postpartum    Patient Active Problem List    Diagnosis Date Noted     Diet controlled gestational diabetes mellitus (GDM) in third trimester 2021     Priority: Medium     Alpha thalassemia minor trait 2021     Priority: Medium     Microcytosis 2021     Priority: Medium     Class 1 obesity due to excess calories without serious comorbidity with body mass index (BMI) of 32.0 to 32.9 in adult 2021     Priority: Medium     Supervision of high risk pregnancy in third trimester 2020     Priority: Medium     Female stress incontinence 2019     Priority: Medium     Acne vulgaris 2019     Priority: Medium      OB History    Para Term  AB Living   6 2  2 0 3 2   SAB TAB Ectopic Multiple Live Births   1 0 0 0 2      # Outcome Date GA Lbr Juan/2nd Weight Sex Delivery Anes PTL Lv   6 Current            5 AB 10/26/20 11w2d          4 SAB 10/08/19 6w0d    SAB      3 Term 19 39w6d 10:37 / 00:10 3.715 kg (8 lb 3 oz) M Vag-Spont Nitrous N CAR      Complications: Fetal Intolerance      Name: Andrew Barrow      Apgar1: 3  Apgar5: 8   2 Term 17 39w4d 07:53 / 00:39 2.863 kg (6 lb 5 oz) F Vag-Spont Nitrous, Local N CAR      Name: Dania Barrow      Apgar1: 8  Apgar5: 9   1 AB              Review of Systems Negative except what is noted in HPI.   Past Medical History:   Diagnosis Date     Group B streptococcal carriage complicating pregnancy 2017     High risk HPV infection 2018     Normal delivery 2017     Normal delivery 2017     Normal pregnancy 10/12/2018      Past Surgical History:   Procedure Laterality Date     DILATION AND CURETTAGE N/A 10/26/2020    Procedure: DILATION AND CURETTAGE, UTERUS, USING SUCTION;  Surgeon: Ginny Terrazas MD;  Location: [facility];  Service: Obstetrics   Patient has no known allergies.  Family History   Problem Relation Age of Onset     No Known Problems Mother      No Known Problems Father      No Known Problems Sister      No Known Problems Brother      No Known Problems Sister      No Known Problems Brother      No Known Problems Brother      Jaundice Daughter              Blindness Daughter         right eye, Microphthalmia, Chronic Retinal detachment      Social History     Socioeconomic History     Marital status:      Spouse name: Not on file     Number of children: 2     Years of education: Not on file     Highest education level: Not on file   Occupational History     Not on file   Tobacco Use     Smoking status: Never Smoker     Smokeless tobacco: Never Used     Tobacco comment: no exposure   Substance and Sexual Activity     Alcohol use: No     Drug use: No     Sexual  activity: Yes     Partners: Male     Birth control/protection: None   Other Topics Concern     Not on file   Social History Narrative    Lives with  Tiffany, 2 kids, and 's older son and his family .  9 people total.      Social Determinants of Health     Financial Resource Strain:      Difficulty of Paying Living Expenses:    Food Insecurity:      Worried About Running Out of Food in the Last Year:      Ran Out of Food in the Last Year:    Transportation Needs:      Lack of Transportation (Medical):      Lack of Transportation (Non-Medical):    Physical Activity:      Days of Exercise per Week:      Minutes of Exercise per Session:    Stress:      Feeling of Stress :    Social Connections:      Frequency of Communication with Friends and Family:      Frequency of Social Gatherings with Friends and Family:      Attends Synagogue Services:      Active Member of Clubs or Organizations:      Attends Club or Organization Meetings:      Marital Status:    Intimate Partner Violence:      Fear of Current or Ex-Partner:      Emotionally Abused:      Physically Abused:      Sexually Abused:      Prior to Admission Medication List  Medications Prior to Admission   Medication Sig Dispense Refill Last Dose     alcohol swab prep pads Use to swab area of injection/leonardo as directed. 100 each 3      blood glucose (NO BRAND SPECIFIED) test strip Use to test blood sugar 4 times daily or as directed. To accompany: Blood Glucose Monitor Brands: per insurance. 100 strip 6      blood glucose calibration (NO BRAND SPECIFIED) solution To accompany: Blood Glucose Monitor Brands: per insurance. 1 each 0      blood glucose monitoring (SOFTCLIX) lancets USE WITH LANCETING DEVICE 100 each 1      Blood Glucose Monitoring Suppl (ACCU-CHEK GUIDE) w/Device KIT USE TO TEST BLOOD SUGAR FOUR TIMES DAILY OR AS DIRECTED.        ferrous gluconate (FERGON) 324 (38 Fe) MG tablet Take 1 tablet (324 mg) by mouth daily (with breakfast) 100 tablet  4      Prenatal Vit-Fe Fumarate-FA (PRENATAL MULTIVITAMIN W/IRON) 27-0.8 MG tablet Take 1 tablet by mouth daily 90 tablet 3       Allergies  No Known Allergies  Immunization History   Administered Date(s) Administered     Flu, Unspecified 10/01/2011, 09/26/2013, 09/30/2015, 12/08/2016, 01/16/2017     HepA, Unspecified 11/04/2011     Hepatitis B Immunity: Titer 09/12/2017     Influenza Vaccine IM > 6 months Valent IIV4 (Alfuria,Fluzone) 10/12/2018, 10/17/2019, 09/30/2020     Influenza Vaccine, 6+MO IM (QUADRIVALENT W/PRESERVATIVES) 10/10/2017     MMR 01/16/2017     Rubella Immunity: Titer/md Dx 07/31/2017     Td,adult,historic,unspecified 01/16/2017     Tdap (Adacel,Boostrix) 09/22/2008, 09/11/2017, 03/12/2019, 08/20/2021     Typhoid IM 11/04/2011     Varicella 01/16/2017, 03/03/2017     Varicella Immunity: Titer/MD Dx 09/12/2017      Physical Exam  No data found.  Wt Readings from Last 1 Encounters:   09/20/21 66.2 kg (146 lb)   Prepregnancy: 58.1 kg (128 lb), BMI 26.76. Total gain: 8.165 kg (18 lb) (expected gain: 7 kg (15 lb)-11.5 kg (25 lb)).    Exam based on prenatal visit in clinic 9/20/21  HEART: RRR, no murmur  LUNGS: CTA bilaterally  CERVIX: dilation 3 cm , 80% effaced,  FLUID: None noted.  Fetal Presentation: vertex.  Labs    Group B Strep PCR   Date Value Ref Range Status   09/02/2021 Negative Negative Final     Comment:     Presumed negative for Streptococcus agalactiae (Group B Streptococcus) or the number of organisms may be below the limit of detection of the assay.      Antibody Screen   Date Value Ref Range Status   02/12/2021 Negative  Final     Hepatitis B Surface Antigen   Date Value Ref Range Status   02/12/2021 Negative Negative Final     Neisseria gonorrhoeae   Date Value Ref Range Status   02/12/2021 Negative Negative Final     Hemoglobin   Date Value Ref Range Status   08/20/2021 12.0 11.7 - 15.7 g/dL Final      No visits with results within 2 Day(s) from this visit.   Latest known visit with  results is:   Office Visit on 09/20/2021   Component Date Value Ref Range Status     Glucose Urine 09/20/2021 Negative  Negative mg/dL Final     Ketones Urine 09/20/2021 Trace* Negative mg/dL Final     Protein Albumin Urine 09/20/2021 Negative  Negative mg/dL Final     SARS CoV2 PCR 09/20/2021 Negative  Negative Final    NEGATIVE: SARS-CoV-2 (COVID-19) RNA not detected, presumed negative.        Completed by:   Selene Rae MD  [facility]  9/23/2021 3:25 PM   used: [place].

## 2021-09-23 NOTE — PROGRESS NOTES
Shaila presented to [facility] for IOL d/t GDM. Spoke with Dr Rae and received orders. Plan is to start low-dose pitocin.

## 2021-09-24 PROCEDURE — 250N000011 HC RX IP 250 OP 636: Performed by: FAMILY MEDICINE

## 2021-09-24 PROCEDURE — 250N000009 HC RX 250: Performed by: FAMILY MEDICINE

## 2021-09-24 PROCEDURE — 99207 PR SUBSEQUENT HOSPITAL CARE FOR MOTHER, 15 MINUTES: CPT | Performed by: FAMILY MEDICINE

## 2021-09-24 PROCEDURE — 120N000001 HC R&B MED SURG/OB

## 2021-09-24 PROCEDURE — 250N000013 HC RX MED GY IP 250 OP 250 PS 637: Performed by: FAMILY MEDICINE

## 2021-09-24 RX ORDER — ACETAMINOPHEN 325 MG/1
650 TABLET ORAL EVERY 4 HOURS PRN
Status: DISCONTINUED | OUTPATIENT
Start: 2021-09-24 | End: 2021-09-25 | Stop reason: HOSPADM

## 2021-09-24 RX ORDER — HYDROCORTISONE 2.5 %
CREAM (GRAM) TOPICAL 3 TIMES DAILY PRN
Status: DISCONTINUED | OUTPATIENT
Start: 2021-09-24 | End: 2021-09-25 | Stop reason: HOSPADM

## 2021-09-24 RX ORDER — BISACODYL 10 MG
10 SUPPOSITORY, RECTAL RECTAL DAILY PRN
Status: DISCONTINUED | OUTPATIENT
Start: 2021-09-24 | End: 2021-09-25 | Stop reason: HOSPADM

## 2021-09-24 RX ORDER — DOCUSATE SODIUM 100 MG/1
100 CAPSULE, LIQUID FILLED ORAL DAILY
Status: DISCONTINUED | OUTPATIENT
Start: 2021-09-24 | End: 2021-09-25 | Stop reason: HOSPADM

## 2021-09-24 RX ORDER — MODIFIED LANOLIN
OINTMENT (GRAM) TOPICAL
Status: DISCONTINUED | OUTPATIENT
Start: 2021-09-24 | End: 2021-09-25 | Stop reason: HOSPADM

## 2021-09-24 RX ORDER — IBUPROFEN 800 MG/1
800 TABLET, FILM COATED ORAL EVERY 6 HOURS PRN
Status: DISCONTINUED | OUTPATIENT
Start: 2021-09-24 | End: 2021-09-25 | Stop reason: HOSPADM

## 2021-09-24 RX ADMIN — Medication 340 ML/HR: at 00:00

## 2021-09-24 RX ADMIN — LIDOCAINE HYDROCHLORIDE 20 ML: 10 INJECTION, SOLUTION INFILTRATION; PERINEURAL at 00:01

## 2021-09-24 RX ADMIN — KETOROLAC TROMETHAMINE 30 MG: 30 INJECTION, SOLUTION INTRAMUSCULAR; INTRAVENOUS at 00:02

## 2021-09-24 RX ADMIN — DOCUSATE SODIUM 100 MG: 100 CAPSULE, LIQUID FILLED ORAL at 11:35

## 2021-09-24 NOTE — PROGRESS NOTES
In room with patient for 15 minutes following nitrous administration. No adverse side effects noted.

## 2021-09-24 NOTE — L&D DELIVERY NOTE
Delivery Summary  [facility] Maternity Care  Date of Service: 2021    Name      Shaila Savage         1985  MRN       [id number]  PCP        Selene Pastor at [facility], [phone].    GA: 39w5d  GP:   Labor Complications: None   Additional Complications:    QBL:    Delivery Type: Vaginal, Spontaneous   Duration of Ruptured Membranes: rupture date, rupture time, delivery date, or delivery time have not been documented  Gaylord Weight:    Apgar scores: 8 , 9      _________________    Prenatal Course:  Shaila Savage is a 35 year old  at 39w5d based on 9 week us.  She presented to [facility] at 9 weeks gestation for regular prenatal care.  She had approximately 7.94 kg (17 lb 8.1 oz) weight gain.  Fundal height was congruent with dates.  U/a and BP were normal throughout.      Prenatal complications included late dx of GDM- diet controlled.         Vaccinations in pregnancy included adacel- needs flu and covid.      DELIVERY NARRATIVE  Stage 1:   Patient presented to Maternity Care on 2021 with plans for induction due to GDM.  Onset of labor at 2100  Patient progressed to complete with pitocin.  Stage 1 was uncomplicated.    Induction yes for diet controlled GDM.    Monitors: external showed category 1 with variables in 2nd stage.    Labor Analgesia/Anesthesia: nitrous  ROM: AROM when complete  Her group B Strep (GBS) carrier status was negative.    Stage 2:  Delivery was via vaginal, spontaneous  to a sterile field. Infant delivered in vertex  left  occiput  anterior  position. Shoulders delivered without difficulty. The baby was placed on the patient's abdomen.  Cord complications: nuchal delivered through. Delayed cord clamping was performed.  The cord was doubly clamped and cut 3 vessels  were noted. Cord blood sent.     Apgars of 8 and 9 via Vaginal, Spontaneous Delivery.    Delivery was complicated by  nothing    Stage 3:   Placenta delivered at 2021 11:59 PM . Placental disposition was Hospital disposal . Fundal massage performed and fundus found to be  firm. The following uterotonics were given: Pitocin (IV). Perineum, vagina, cervix were inspected by provider and the following lacerations were noted: The following lacerations were present: 2nd degree perineal. Lacerations were repaired in the usual fashion using 3-0 Vicryl..    QBL: 350 mL.      Firm fundus noted without active bleeding at the end of her evaluation with excellent hemostasis noted.    Cord pH obtained: no    Stage 4:  Mom and baby are both stable in delivery room.  Mom plans to formula feed baby.  Vaccines flu and covid needed.   Plans for birth control undecided- probably last baby.  Anticipate discharge for mom and baby within 48 hours.     Miguelangel Savage [[id number]]    Labor Event Times    Labor onset date: 21 Onset time:  9:00 PM   Dilation complete date: 21 Complete time: 11:50 PM   Start pushing date/time: 2021 2350      Labor Events     labor?: No   steroids: None  Labor Type: Induction/Cervical ripening  Predominate monitoring during 1st stage: continuous electronic fetal monitoring     Antibiotics received during labor?: No     Rupture date/time: 21   Rupture type: Artificial Rupture of Membranes  Fluid color: Clear  Fluid odor: Normal     Induction: Oxytocin  Induction date/time: 21   Cervical ripening date/time:        1:1 continuous labor support provided by?: RN Labor partogram used?: no      Delivery/Placenta Date and Time    Delivery Date: 21 Delivery Time: 11:56 PM   Placenta Date/Time: 2021 11:59 PM  Oxytocin given at the time of delivery: after delivery of baby  Delivering clinician: Selene Rae MD   Other personnel present at delivery:  Provider Role   Isabel Harris RN Delivery Nurse   Dulce Maria Campos RN Registered Nurse         Vaginal Counts      Initial count performed by 2 team members:  Two Team Members   OMEGA Nunez Dr.       Needles Suture Needles Sponges (RETIRED) Instruments   Initial counts       Added to count       Relief counts       Final counts             Placed during labor Accounted for at the end of labor   FSE NA NA   IUPC NA NA   Cervadil NA NA              Final count performed by 2 team members:  Two Team Members   OMEGA Nunez Dr.      Final count correct?: Yes     Apgars    Living status: Living   1 Minute 5 Minute 10 Minute 15 Minute 20 Minute   Skin color: 0  1       Heart rate: 2  2       Reflex irritability: 2  2       Muscle tone: 2  2       Respiratory effort: 2  2       Total: 8  9       Apgars assigned by: ALEXANDRA BENAVIDES RN     Cord    Vessels: 3 Vessels    Cord Complications: Nuchal   Nuchal Intervention: delivered through         Nuchal cord description: loose nuchal cord         Cord Blood Disposition: Lab    Gases Sent?: No    Delayed cord clamping?: Yes    Cord Clamping Delay (seconds):  seconds    Stem cell collection?: No        Resuscitation    Methods: Suctioning  Output in Delivery Room: Voided     [place] Measurements    Output in delivery room: Voided     Skin to Skin and Feeding Plan    Skin to skin initiation date/time: 1841    Skin to skin with: Mother  Skin to skin end date/time:        Labor Events and Shoulder Dystocia    Fetal Tracing Prior to Delivery: Category 1, Category 2  Shoulder dystocia present?: Neg     Delivery (Maternal) (Provider to Complete) ([id number])    Episiotomy: None  Perineal lacerations: 2nd Repaired?: Yes   Repair suture: 3-0 Vicryl  Genital tract inspection done: Pos     Blood Loss  Mother: Shaila Savage #[id number]   Start of Mother's Information    Delivery Blood Loss  21 2100 - 21 0026    None           End of Mother's Information  Mother: Catrachito Savageangelina #[id number]          Delivery - Provider to Complete ([id number])    Delivering clinician:  Selene Rea MD  Attempted Delivery Types (Choose all that apply): Spontaneous Vaginal Delivery  Delivery Type (Choose the 1 that will go to the Birth History): Vaginal, Spontaneous                   Other personnel:  Provider Role   Isabel Harris, RN Delivery Nurse   Dulce Maria Campos, RN Registered Nurse                Placenta    Date/Time: 9/23/2021 11:59 PM  Removal: Spontaneous  Disposition: Hospital disposal           Anesthesia    Method: Nitrous Oxide                Presentation and Position    Presentation: Vertex    Position: Left Occiput Anterior                 Completed by:   Selene Rae MD  [facility]  9/24/2021 12:23 AM   used: none- patient had her adult step daughter present who served as an  per patient request.

## 2021-09-24 NOTE — PROGRESS NOTES
LABOR PROGRESS NOTE  2021 11:07 PM      SUBJECTIVE:  Shaila Savage is a 35 year old  at 39w4d based on 9week us with Estimated Date of Delivery: Sep 26, 2021.  Pain strong, controlled with support from family and requesting nitrous  No LOF but bloody show noted.    Pitocin up to 8mu for induction for diet controlled GDM.     OBJECTIVE:  /75   Pulse 80   Temp 98.1  F (36.7  C)   Resp 16   Wt 66 kg (145 lb 8.1 oz)   LMP 2020 (Exact Date)   SpO2 99%   BMI 30.41 kg/m    FHR: Baseline: 140 bpm, Variability: Moderate (6 - 25 bpm), Accelerations: Present and Decelerations: no significant  Uterine Contractions:  regular, every 2-3 minutes.  Cervix: dilation 8 cm , 100% effaced, soft, and -1 station.  Fluid: None noted.  Fetal Presentation: vertex.    ASSESSMENT & PLAN:   35 year old  at 39w4d.    Diet controlled GDM admitted today for induction of labor in active transitional labor.     Nitrous per pt request    Glucose monitoring stable and within target range    GBS and covid negative.      Anticipate  in near future.      Completed by:   Selene Rae MD, M.D.  [facility]  2021 11:07 PM

## 2021-09-24 NOTE — PLAN OF CARE
Problem: Pain (Postpartum Vaginal Delivery)  Goal: Acceptable Pain Control  Outcome: Improving     Problem: Urinary Retention (Postpartum Vaginal Delivery)  Goal: Effective Urinary Elimination  Outcome: Improving     Shaila's pain is well controlled with Toradol that was given immediately following delivery. She was able to ambulate to the bathroom and empty her bladder.

## 2021-09-24 NOTE — PROGRESS NOTES
Maternal Postpartum Progress Note  [facility] Maternity Care  Date of Service: 2021    Name      Shaila Savage         1985  MRN       [id number]  PCP        Selene Rae    ________________________________________________________________________    Subjective:  Patient denies headache,dyspnea, and leg pain. A little dizzy if gets up right now.  Discussed getting up slowly and asking for help today.  Ambulating and eating.  Voiding spontaneously.  Mood stable  Formula feeding  Lochia normal.  Pain controlled with ibuprofen Tylenol    Objective:  Patient Vitals for the past 24 hrs:   BP Temp Temp src Pulse Resp SpO2 Weight   21 0559 106/61 98.2  F (36.8  C) Oral 77 16 -- --   21 0156 113/56 -- -- -- -- -- --   21 0141 104/66 98.2  F (36.8  C) Oral 66 18 -- --   21 0127 102/61 -- -- -- -- -- --   21 0105 113/61 -- -- -- -- -- --   21 0050 119/64 -- -- -- -- -- --   21 0035 116/68 -- -- -- -- -- --   21 0020 110/64 -- -- -- -- -- --   21 0000 120/67 -- -- -- -- -- --   21 2330 120/70 98.9  F (37.2  C) Oral 76 18 98 % --   21 2105 123/75 98.1  F (36.7  C) -- -- 16 -- --   21 1900 118/72 98.3  F (36.8  C) Oral -- 16 -- --   21 1639 114/67 98.5  F (36.9  C) Oral 80 16 99 % 66 kg (145 lb 8.1 oz)     General: Alert, comfortable.  Heart: RRR, no murmur.  Lungs: CTA bilaterally.  Abdomen: non-distended. Uterine fundus firm, below umbilicus.  Ext: trace edema, no calf tenderness.    Labs:  Recent Results (from the past 24 hour(s))   Adult Type and Screen    Collection Time: 21  4:00 PM   Result Value Ref Range    ABO/RH(D) O POS     Antibody Screen Negative Negative    SPECIMEN EXPIRATION DATE [id number]    Extra Purple Top Tube    Collection Time: 21  4:01 PM   Result Value Ref Range    Hold Specimen [facility]    Hemoglobin A1c    Collection Time: 21  4:01 PM   Result Value Ref Range     Hemoglobin A1C 5.3 <=5.6 %   Glucose by meter    Collection Time: 09/23/21  7:06 PM   Result Value Ref Range    GLUCOSE BY METER POCT 107 (H) 70 - 99 mg/dL        Assessment:   Postpartum Day #1 s/p vaginal delivery.    Patient Active Problem List    Diagnosis Date Noted     Normal delivery 09/24/2021     Priority: Medium     Diet controlled gestational diabetes mellitus (GDM) in third trimester 08/27/2021     Priority: Medium     Alpha thalassemia minor trait 02/18/2021     Priority: Medium     Microcytosis 02/16/2021     Priority: Medium     Class 1 obesity due to excess calories without serious comorbidity with body mass index (BMI) of 32.0 to 32.9 in adult 02/12/2021     Priority: Medium     Supervision of high risk pregnancy in third trimester 09/30/2020     Priority: Medium     Female stress incontinence 08/06/2019     Priority: Medium     Acne vulgaris 01/11/2019     Priority: Medium        Plan:   - Continue current care.  - Fasting BS tomorrow AM  - Likely home tomorrow.    Completed by:   Virgie Alejo DO, M.D.  [facility]  9/24/2021 8:15 AM   used: Not needed.

## 2021-09-24 NOTE — DISCHARGE INSTRUCTIONS
Vaginal Delivery Discharge Instructions: Hmong  Ua zog:     Thov tsev neeg thiab phooj ywg pab thaum koj xav tau.    Tsis txhob  ib yam dab tsi nyob taina hauv koj qhov chaw mos txog thuam koj tus kws gomez mob pom zoo.    Bayron li so ob peb colon tiam sherif ntej kom koj lub cev thiaj muaj sij hawm rov qab zoo. Thaum txog lub sij hawm ntawd koj ua tau thierry yam raws li koj xav tias koj ua tau.    Tsis txhob tsav tsheb thaum koj noj tshuaj raws li koj tus kws gomez mob hais. Koj tsav tsheb los tau yog tias koj noj cov tshuaj uas koj yuav sherif kiab khw.    Hu koj tus kws gomez mob yog tias muaj ib yam nram qab no uas qhia tias koj muaj mob:    Koj cov ntshav ntxaum ib daim ntaub tas ua ntej dhau ib teev, los yog koj pom cov ntshav khov uas loj alexia li ib lub pob golf.    Los ntshav ntev tshaj 6 lub colon tiam.    Koj tawm kua hauv chaw mos uas tsw phem.     Ua npaws 100.4 ?F (38 ?C) rov pratik (uas ntsuas hauv qab koj tus nplaig), tsis hais ua daus no los sis tsis ua daus no    Mob heev, plab khov los sis mob ntawm koj lub plab mog.    Haj yam hnov mob, qhov chaw phais liab liab, o o los sis muaj kua nyob ntawm koj cov xov xaws tawv nqaij.    Yuav tsum tony zis ntau zaus los yog yuav tsum dim zis heev, los sis kub kub thaum koj tony zis.    Liab liab, o o los sis mob ntawm ib txoj leeg hauv koj txhais ceg.    Muaj teeb meem pub mis, los sis muaj ib qhov chaw liab los yog qhov chaw mob ntawm koj lub mis.    Hnov mob loj zuj zus los sis hnov mob tas li sherif qab tau txiav chaw mos me ntsis los sis koj chaw mos ntuag.     Xeev siab thiab ntuav.    Koj mob hauv siab thiab hnoos los yog nyuaj taina koj ua pa.    Muaj teeb meem ntawm chemo tu siab, chemo txhawj xeeb, los yog chemo nyuaj siab.   Yog tias koj ntshai tias koj txhob txwm yuav ua taina koj tus kheej los sis tus me nyuam mos liab mob, bayron li hu koj tus kws gomez mob kiag.     Koj muaj karen nug los yog chemo txhawj xeeb sherif qab koj rov qab mus tsev.  Ntxuav octavio kom huv:  Ntxuav koj octavio ua ntej koj kov  koj chaw mos thiab xov xaws tawv nqaij txhua lub sij hawm.  Qhov no yuav pab kom tsis txhob raug kab mob.  Yog tias koj txhais octavio huv lawm, leonidesj siv tau ib daim ntxaum cawv so octavio kom ntxuav koj txhais octavio. Nancy li tu koj taina octavio kom luv thiab huv.        Vaginal Delivery Discharge Instructions  Activity:     Ask family and friends for help when you need it.    Do not place anything in your vagina until your doctor approves.    Take it easy for the next few weeks to allow your body to recover. You may do any activities you feel up to at that point.    Do not drive while taking pain pills prescribed by your doctor. You may drive if taking over-the-counter pain pills.    Call your health care provider if you have any of these symptoms:    You soak a sanitary pad with blood within 1 hour, or you see blood clots larger than a golf ball.    Bleeding that lasts more than 6 weeks.    You have vaginal discharge that smells bad.     A fever of 100.4? F (38? C) or higher (temperature taken under your tongue), with or without chills     Severe, pain, cramping or tenderness in your lower belly area.    Increased pain, swelling, redness or fluid around your stitches.    A more frequent or urgent need to urinate (pee), or it burns when you pee.    Redness, swelling or pain around a vein in your leg.    Problems coping with sadness, anxiety, or depression.     Problems breastfeeding, or a red or painful area on your breast.    Pain that increases or does not go away from an episiotomy or perineal tear.    Nausea and vomiting.    Chest pain and cough or are gasping for air.    If you have any concerns about hurting yourself or the baby, call your doctor right away.     You have questions or concerns after you return home.    Keep your hands clean:  Always wash your hands before touching your perineal area and stitches.  This helps reduce your risk of infection.  If your hands aren t dirty, you may use an alcohol hand-rub to clean  your hands. Keep your nails clean and short.

## 2021-09-24 NOTE — PLAN OF CARE
Problem: Adult Inpatient Plan of Care  Goal: Plan of Care Review  Outcome: Improving     Problem: Adjustment to Role Transition (Postpartum Vaginal Delivery)  Goal: Successful Maternal Role Transition  Outcome: Improving     Problem: Bleeding (Postpartum Vaginal Delivery)  Goal: Hemostasis  Outcome: Improving     Problem: Infection (Postpartum Vaginal Delivery)  Goal: Absence of Infection Signs and Symptoms  Outcome: Improving     Problem: Pain (Postpartum Vaginal Delivery)  Goal: Acceptable Pain Control  Outcome: Improving     Problem: Urinary Retention (Postpartum Vaginal Delivery)  Goal: Effective Urinary Elimination  Outcome: Improving   BP 94/60   Pulse 78   Temp 98.3  F (36.8  C) (Oral)   Resp 20   Wt 66 kg (145 lb 8.1 oz)   LMP 12/20/2020 (Exact Date)   SpO2 100%   Breastfeeding Unknown   BMI 30.41 kg/m    Patient is telling me she has no pain. Lochia is scant and fundus is firm, midline and U.  She is formula feeding and is going well.

## 2021-09-24 NOTE — PROGRESS NOTES
1910: Spoke with Dr Rae and gave her an update on pt status. Pt SVE 3/70/-1. Patient varsha but pt states contractions are not painful at this time.

## 2021-09-24 NOTE — PLAN OF CARE
Problem: Change in Fetal Wellbeing (Labor)  Goal: Stable Fetal Wellbeing  Outcome: Improving     Problem: Delayed Labor Progression (Labor)  Goal: Effective Progression to Delivery  Outcome: Improving     Pt vital signs stable and afebrile. EFM category I throughout shift and pt varsha but denies pain with contractions. Encouraged patient to get out of bed and ambulate in order to help labor progress. Goal is .

## 2021-09-25 VITALS
WEIGHT: 145.5 LBS | DIASTOLIC BLOOD PRESSURE: 60 MMHG | OXYGEN SATURATION: 100 % | HEART RATE: 61 BPM | BODY MASS INDEX: 30.41 KG/M2 | RESPIRATION RATE: 18 BRPM | TEMPERATURE: 97.6 F | SYSTOLIC BLOOD PRESSURE: 98 MMHG

## 2021-09-25 PROCEDURE — 250N000013 HC RX MED GY IP 250 OP 250 PS 637: Performed by: FAMILY MEDICINE

## 2021-09-25 PROCEDURE — 99207 PR SUBSEQUENT HOSPITAL CARE FOR MOTHER, 15 MINUTES: CPT | Performed by: FAMILY MEDICINE

## 2021-09-25 RX ORDER — IBUPROFEN 800 MG/1
800 TABLET, FILM COATED ORAL EVERY 6 HOURS PRN
Qty: 60 TABLET | Refills: 1 | Status: SHIPPED | OUTPATIENT
Start: 2021-09-25 | End: 2023-12-14

## 2021-09-25 RX ORDER — DOCUSATE SODIUM 100 MG/1
100 CAPSULE, LIQUID FILLED ORAL DAILY
Qty: 60 CAPSULE | Refills: 1 | Status: SHIPPED | OUTPATIENT
Start: 2021-09-26 | End: 2023-12-14

## 2021-09-25 RX ADMIN — DOCUSATE SODIUM 100 MG: 100 CAPSULE, LIQUID FILLED ORAL at 09:20

## 2021-09-25 RX ADMIN — IBUPROFEN 800 MG: 800 TABLET, FILM COATED ORAL at 15:16

## 2021-09-25 RX ADMIN — IBUPROFEN 800 MG: 800 TABLET, FILM COATED ORAL at 00:28

## 2021-09-25 NOTE — PLAN OF CARE
Problem: Adult Inpatient Plan of Care  Goal: Plan of Care Review  Outcome: No Change  Flowsheets (Taken 9/25/2021 1450)  Plan of Care Reviewed With: patient   Patient ready for discharge home today after 3rd baby. She will board in room 18 after discharge tonight as baby requires phototherapy. Meds for home use sent to pts pharmacy. Instructions post vaginal delivery in Hmong language given to pt.

## 2021-09-25 NOTE — PLAN OF CARE
"  Problem: Adult Inpatient Plan of Care  Goal: Plan of Care Review  9/25/2021 1517 by Galina Ferguson, RN  Outcome: Adequate for Discharge  9/25/2021 1450 by Galina Ferguson, RN  Outcome: No Change  Flowsheets (Taken 9/25/2021 1450)  Plan of Care Reviewed With: patient   Discharge education, follow up appointment, safe use of meds and danger signals reviewed via \"David\" #[id number] on language line service via [employer]. Pt verbalized and signed understanding. She is now going to boarding status, her  will bring dinner and meds from home.  "

## 2021-09-25 NOTE — PLAN OF CARE
Problem: Pain (Postpartum Vaginal Delivery)  Goal: Acceptable Pain Control  9/25/2021 0227 by Mallory Ovalles RN  Outcome: Improving  9/25/2021 0226 by Mallory Ovalles RN  Outcome: Improving   Mom taking Motrin 800mg for back pain. Pain level 5/10 now 0/10. Pt aware [place] [facility]  she can have pain meds as needed. Mom independent with self and baby care  Mallory Ovalles, RN

## 2021-09-25 NOTE — DISCHARGE SUMMARY
Maternal Discharge Summary  [facility]aternity [facility]  Date of Service: 2021    Name      Shaila Savage         1985  MRN       [id number]  PCP        Selene Pastor at [facility], [phone].    ________________________________________________________________________    Assessment:  Shaila Savage is a 35 year old now  s/p vaginal, spontaneous  at 39w4d on 21.  Gestational DM-diet controlled: A1c 5.3. f/u PP visit.     Discharge Plan:   1. Discharge to Home. Condition at Discharge:  stable  2. Physical activity: Regular.  3. Diet:  Traditional ong postpartum diet.  4. Home care nurse: not indicated. Infant staying for phototherapy  5. Lactation clinic appointment: not indicated.  6. Contraception plan: undecided, will follow up at postpartum visit.  7. Follow up with Selene Pastor in 2 weeks and 6 weeks for routine postpartum care.  Future Appointments   Date Time Provider Department Center   2021  7:00 AM [facility] US 2 JNULTS [facility]   2021  7:45 AM [facility] L&D PROCEDURE JNLBDL [facility]   10/7/2021  7:45 AM [facility] L&D PROCEDURE [facility]DL [facility]   2021  8:00 AM [facility] L&D PROCEDURE [facility]BDL [facility]        ________________________________________________________________________    Admission Date:  2021  Discharge Date:  2021  Delivery Date:  21  Gestational Age at Delivery: 39w4d    Principal Diagnosis: Labor and delivery  Delivery type: Vaginal, Spontaneous     Principal Problem:    Normal delivery  Active Problems:    Diet controlled gestational diabetes mellitus (GDM) in third trimester      Subjective:  Patient denies headache, dizziness, dyspnea, and leg pain. Ambulating and eating. Voiding spontaneously. Lochia normal. Pain controlled.     Discharge Exam:  Patient Vitals for the past 24 hrs:   BP Temp Temp src Pulse Resp SpO2   21 0900 98/60 97.6  F (36.4  C) Oral 61 18 100 %   21 0010 119/77 99  F  (37.2  C) Oral 75 20 100 %   09/24/21 1745 94/60 98.3  F (36.8  C) Oral 78 20 100 %     General - alert, comfortable  Heart - RRR, no murmurs  Lungs - CTA bilaterally  Abdomen - fundus firm, nontender, below umbilicus  Extremities - trace edema  Admission on 09/23/2021   Component Date Value Ref Range Status     ABO/RH(D) 09/23/2021 O POS   Final     Antibody Screen 09/23/2021 Negative  Negative Final     SPECIMEN EXPIRATION DATE 09/23/2021 [id number]   Final     Hold Specimen 09/23/2021 [facility]   Final     Hemoglobin A1C 09/23/2021 5.3  <=5.6 % Final      Prediabetes: 5.7 to 6.4%        Diabetes:  >=6.5%     Patients with Hgb F >5%, total bilirubin >10.0 mg/dL, abnormal red cell turnover, severe renal or hepatic disease or malignancy should not have this A1C method used to diagnose or monitor diabetes.      GLUCOSE BY METER POCT 09/23/2021 107* 70 - 99 mg/dL Final      Discharge Medications:   See medication reconciliation.     Completed by:   Virgie Alejo DO  [facility]  9/25/2021 8:20 AM   used: Yadira

## 2021-09-29 ENCOUNTER — PATIENT OUTREACH (OUTPATIENT)
Dept: CARE COORDINATION | Facility: CLINIC | Age: 36
End: 2021-09-29

## 2021-09-29 ENCOUNTER — MEDICAL CORRESPONDENCE (OUTPATIENT)
Dept: HEALTH INFORMATION MANAGEMENT | Facility: CLINIC | Age: 36
End: 2021-09-29

## 2021-09-29 NOTE — PROGRESS NOTES
CCC SW attempted to reach Shaila by phone with an  to check in after her hospital stay. No answer, left voicemail and reminded her she is recommended to have a 2 week follow up with PCP.

## 2021-10-14 ENCOUNTER — PATIENT OUTREACH (OUTPATIENT)
Dept: CARE COORDINATION | Facility: CLINIC | Age: 36
End: 2021-10-14

## 2021-10-14 NOTE — PROGRESS NOTES
Clinic Care Coordination Contact  Community Health Worker Follow Up    Spoke to Patient (Shaila) with the help of an  (ID: [id number])      CHW Introduced of intent of call regarding follow up.     Patient reported that she is doing well and that is glad that we glad. CHW acknowledged.    CHW informed/ confirmed current status of [facility]. CHW stated that Patient is currently on Maintenance, and has completed all of her goals. Patient acknowledged. CHW inquired if Patient is in need of additional support/ resources from [facility]. Patient expressed that she is in need of assistance and further indicated that she spoke with someone that stated she ask for too many resources, and futher expressed if its ok that she need help at the moment. CHW acknowledged and informed Patient that [facility] Team is here to help Patient with any support/ assistance. Patient acknowledged and expressed the need of receiving assistance in regards to obtaining food stamps, and need help understanding a bill (hosptial or clinic bill) she received and futher expressed that the bill is $30 and she can pay it but just needs to know why she was charged. CHW acknowledged and completed an FRW screening with Patient.     Financial Resource Worker Screening  [facility] Benefits  Is patient requesting help applying for [facility] benefits?: Yes (Specifically Looking for Assistance for Obtaining Food [place].)  Have you recently applied for any [facility] benefits?: No  How many people in your household?: 5 (Patient, Patient's  and Three Children)  Do you buy/eat food together?: Yes (Patient and Patient's  Live Togther and Prepare and Buy Food Together.)  What is the monthly gross income for the household (wages, social security, workers comp, and pension)? 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    Care Coordination team will tell patient:   Thank you for answering all the questions, based on screening questions, our Financial Resource Worker will reach out to you with additional questions and next steps.    Care Gaps:   Health Maintenance Due   Topic Date Due     Pneumococcal Vaccine: Pediatrics (0 to 5 Years) and At-Risk Patients (6 to 64 Years) (1 of 4 - PCV13) Never done     COVID-19 Vaccine (1) Never done     INFLUENZA VACCINE (1) 09/01/2021       Postponed to to Next CHW Outreach     Goals: All Completed, Patient was on Maintenance and expressed need for additional resources and support from CCC.        Intervention and Education during outreach: CHW encouraged Patient to contact CHW and CCC Team if further assistance or support is needed.       CHW Plan: CHW outreach in one month.       Sudha Tubbs CHW  Clinic Care Coordination   [facility]   Phone: [phone]  [email or web address]

## 2021-10-17 ENCOUNTER — HEALTH MAINTENANCE LETTER (OUTPATIENT)
Age: 36
End: 2021-10-17

## 2021-10-18 ENCOUNTER — PATIENT OUTREACH (OUTPATIENT)
Dept: CARE COORDINATION | Facility: CLINIC | Age: 36
End: 2021-10-18

## 2021-10-18 NOTE — PROGRESS NOTES
Clinic Care Coordination Contact  Program: [facility]  County:  [facility] Case #:  County Worker:   Subscriber #: [id number]  Renewal:  Date Applied:     FRW Outreach:  10/18/2021: FRW tried calling patient and could not get an  on the line. FRW will try back later. FRW called back and spoke with patient, we went through the combined application screening. The  was disconnected. FRW tried back x3 with no answer. FRW left a vm with call back information. FRW will try back later. FRW called patient back later and left a vm with call back information. FRW will make outreach in 1 week.     SNAP/CASH Application Screenin. Have you had [facility] benefits before?   2. How many people in the household, do you eat/buy food together? 5, herself,  and 3 kids.   3. What is your monthly income (include all tax members)?  works $17, 40 hours  4. Do you have a bank account?   5. Do you have any utility bills (electricity, rent, mortgage, phone, insurance, medical bills, etc.)? Rent $1,270   6. Do you have social security cards and/or green cards?  and 4 kids are [place] citizens, she has LPR       Health Insurance:    Major Programs  This subscriber is eligible for FF: [place] [facility].  Elig Type M2: [place] group II  Eligibility Begin Date: 2020  Eligibility End Date: --/--/----  This subscriber is eligible for the following service types: Medical Care ,  Chiropractic ,  Dental Care ,  Hospital ,  Hospital - Inpatient ,  Hospital - Outpatient ,  Emergency Services ,  Pharmacy ,  Professional (Physician) Visit - Office ,  Vision (Optometry) ,  Mental Health ,  Urgent Care  Prepaid Health Plan  This subscriber receives FF01 - [place] delivered through [facility]. The phone numbers are: [phone] (metro) or [phone] (toll free).    Referral/Screening:    [facility] Benefits  Is patient requesting help applying for [facility] benefits?: Yes (Specifically Looking for  Assistance for Obtaining Food [place].)  Have you recently applied for any county benefits?: No  How many people in your household?: 5 (Patient, Patient's  and Three Children)  Do you buy/eat food together?: Yes (Patient and Patient's  Live Togther and Prepare and Buy Food Together.)  What is the monthly gross income for the household (wages, social security, workers comp, and pension)? 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     Care Coordination team will tell patient:   Thank you for answering all the questions, based on screening questions, our Financial Resource Worker will reach out to you with additional questions and next steps.

## 2021-10-20 ENCOUNTER — PATIENT OUTREACH (OUTPATIENT)
Dept: CARE COORDINATION | Facility: CLINIC | Age: 36
End: 2021-10-20

## 2021-10-20 NOTE — PROGRESS NOTES
Clinic Care Coordination Contact    Situation: Patient chart reviewed by care coordinator.    Background: SW completed chart review    Assessment: CHW in contact with patient, completed FRW referral     Plan/Recommendations: Standard outreach

## 2021-10-25 ENCOUNTER — PATIENT OUTREACH (OUTPATIENT)
Dept: CARE COORDINATION | Facility: CLINIC | Age: 36
End: 2021-10-25

## 2021-10-25 NOTE — PROGRESS NOTES
Clinic Care Coordination Contact  Program: [facility]:  [facility] Case #:  [facility] Worker:   Subscriber #: [id number]  Renewal:  Date Applied:     FRW Outreach:  10/25/2021: FRW called patient and she states she spoke to her  and they do not want to apply at this time. Patient will notify someone when they are ready to apply. FRW will resolve the FRW episode and remove patient from panel.   10/18/2021: FRW tried calling patient and could not get an  on the line. FRW will try back later. FRW called back and spoke with patient, we went through the combined application screening. The  was disconnected. FRW tried back x3 with no answer. FRW left a vm with call back information. FRW will try back later. FRW called patient back later and left a vm with call back information. FRW will make outreach in 1 week.     SNAP/CASH Application Screenin. Have you had county benefits before?   2. How many people in the household, do you eat/buy food together? 5, herself,  and 3 kids.   3. What is your monthly income (include all tax members)?  works $17, 40 hours  4. Do you have a bank account?   5. Do you have any utility bills (electricity, rent, mortgage, phone, insurance, medical bills, etc.)? Rent $1,270   6. Do you have social security cards and/or green cards?  and 4 kids are [place] citizens, she has LPR       Health Insurance:    Major Programs  This subscriber is eligible for FF: [facility].  [place] Type M2: [facility] group [facility]  Eligibility Begin Date: 2020  Eligibility End Date: --/--/----  This subscriber is eligible for the following service types: Medical Care ,  Chiropractic ,  Dental Care ,  Hospital ,  Hospital - Inpatient ,  Hospital - Outpatient ,  Emergency Services ,  Pharmacy ,  Professional (Physician) Visit - Office ,  Vision (Optometry) ,  Mental Health ,  Urgent Care  Prepaid Health Plan  This subscriber receives FF01 - [place] delivered  through [facility]. The phone numbers are: [phone] (metro) or [phone] (toll free).    Referral/Screening:    County Benefits  Is patient requesting help applying for [facility] benefits?: Yes (Specifically Looking for Assistance for Obtaining Food [place].)  Have you recently applied for any [facility] benefits?: No  How many people in your household?: 5 (Patient, Patient's  and Three Children)  Do you buy/eat food together?: Yes (Patient and Patient's  Live Togther and Prepare and Buy Food Together.)  What is the monthly gross income for the household (wages, social security, workers comp, and pension)? 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     Care Coordination team will tell patient:   Thank you for answering all the questions, based on screening questions, our Financial Resource Worker will reach out to you with additional questions and next steps.

## 2021-11-10 ENCOUNTER — PATIENT OUTREACH (OUTPATIENT)
Dept: NURSING | Facility: CLINIC | Age: 36
End: 2021-11-10
Payer: COMMERCIAL

## 2021-11-10 NOTE — PROGRESS NOTES
Clinic Care Coordination Contact  Community Health Worker Follow Up    Care Gaps:   Health Maintenance Due   Topic Date Due     Pneumococcal Vaccine: Pediatrics (0 to 5 Years) and At-Risk Patients (6 to 64 Years) (1 of 4 - PCV13) Never done     COVID-19 Vaccine (1) Never done     INFLUENZA VACCINE (1) 09/01/2021     Addressed with patient. Pt confirmed that she will discuss this with PCP at next office visit appt on 11/22/2021.    Goals:   Goals Addressed as of 11/16/2021 at 11:09 AM                    11/10/21       Financial Wellbeing (pt-stated)   70%    Added 10/20/21 by Rey Malik, [facility]      Goal Statement: I want to understand my medical bill within 1 month.    Date Goal set: 10/20/21  Barriers: language   Strengths:   Date to Achieve By: 11/30/2021  Patient expressed understanding of goal: Yes  Action steps:   1. I have been working with [facility] FRW about [facility] benefit and a medical bill that is $34.00. No longer receiving the same bill.   2. I will wait another 1-2 month and see if same bill will send my way again.  3. I will update status with CCC team at next CHW follow up.     (Updated: 11-)             Other (pt-stated)       Added 11/10/21 by Sandra Fields MA      Goal Statement: I need help with solving the medical bill that is over $4,000.00 within the next 1-2 months.      Date goal set: 11-  Measure of Success:   Barriers: language   Strengths: self and spouse  Date to Achieve By: 1-  Patient expressed understanding of goal: yes    Action steps to achieve this goal:  1. I will drop off the bill statement received recently to [facility] for my CHW to review and will go from there.   2. I will update status with CCC team.              Other (pt-stated)   50%    Added 11/10/21 by Sandra Fields MA      Goal Statement: I need help with the 2021 thanksgiving basket registration within the next 3-10 business days.     Date goal set: 11-  Measure of Success:   Barriers: language  and COVID-19  Strengths: self/spouse  Date to Achieve By: 12-  Patient expressed understanding of goal: yes    Action steps to achieve this goal:  1. I will show the e-mail and text message/QR [facility] on 11- at  time between 9:00AM-12:00PM.   2. I will connect [facility] at phone number provided via e-mail message with any questions.     Note/comment:   Registration completed on 11-. Pt confirmed email message and text message received. (MX)          Discussion:   CCC CHW was able to connect with patient today. Informed pt about the [place] online registration. Pt request CHW help with this and confirmed her e-mail address as [email or web address] and cell phone number is [phone] (Note: see below for details). CHW reminded pt for postpartum appt 11/22/2021. Pt set new goals.   CHW suggested patient to:      Pt reported:   -Have my baby in September 2021. I and baby are doing good. Will attend postpartum appt with PCP on 11/22/2021.   -Needs help with medical bills. Received new medical bill that is over $4,000.00. Will drop off this bill to [facility] sometime this week for CCC team to review.   -Doing well. No other questions or concerns at this time.     Completed thanksgiving basket online registration confirmation info:   CHW was able to assisted and completed the online registration for patient per pt request. Pt registered for 11- and  time between 9:00AM-12:00PM. Pt is aware to  her basket at address provide in email or text message that send to her today. Pt confirmed e-mail and text messages sent to her already during conversation.     CHW Next Follow-up: goals follow up     Outreach frequency: monthly     CHW Plan:   -Waiting for pt to drop off medical bill to [facility] for [facility] team to review.  -Next CHW outreach: 12-

## 2021-11-18 ENCOUNTER — PATIENT OUTREACH (OUTPATIENT)
Dept: NURSING | Facility: CLINIC | Age: 36
End: 2021-11-18
Payer: COMMERCIAL

## 2021-11-18 NOTE — PROGRESS NOTES
Clinic Care Coordination Contact    Follow Up Progress Note      Assessment: [facility] SW contacted Shaila with an  to discuss the medical bill she has. It is for the delivery of her  Fabián Barrow. Fabián needs to be added to her MA case.    SARATH and Shaila contacted [facility], they transferred us to her , Selene. Left voicemail for Selene to support with adding Fabián.     SW will check in next week.     Care Gaps:    Health Maintenance Due   Topic Date Due     Pneumococcal Vaccine: Pediatrics (0 to 5 Years) and At-Risk Patients (6 to 64 Years) (1 of 4 - PCV13) Never done     COVID-19 Vaccine (1) Never done     INFLUENZA VACCINE (1) 2021       Deferred addressing care gaps due to SW follow up to discuss goal.      Goals addressed this encounter:   Goals Addressed                    This Visit's Progress       Other (pt-stated)         Goal Statement: I need help with solving the medical bill that is over $4,000.00 within the next 1-2 months.      Date goal set: 11-  Measure of Success:   Barriers: language   Strengths: self and spouse  Date to Achieve By: 2022  Patient expressed understanding of goal: yes    Action steps to achieve this goal:  1. I will wait for Selene from [facility] to contact me about adding Fabián to my case  2. I will update SARATH next week                 Intervention/Education provided during outreach: See above     Outreach Frequency: monthly    Plan:   SARATH will outreach next week

## 2021-11-26 ENCOUNTER — PATIENT OUTREACH (OUTPATIENT)
Dept: NURSING | Facility: CLINIC | Age: 36
End: 2021-11-26
Payer: COMMERCIAL

## 2021-11-26 NOTE — PROGRESS NOTES
Clinic Care Coordination Contact  Care Team Conversations    SW contacted Shaila with an  to check if she had heard from , Selene. She doesn't believe she did. SARATH will check in next week [place] to the county being [place].

## 2021-12-02 ENCOUNTER — PATIENT OUTREACH (OUTPATIENT)
Dept: NURSING | Facility: CLINIC | Age: 36
End: 2021-12-02
Payer: COMMERCIAL

## 2021-12-02 ENCOUNTER — PATIENT OUTREACH (OUTPATIENT)
Dept: CARE COORDINATION | Facility: CLINIC | Age: 36
End: 2021-12-02

## 2021-12-02 NOTE — TELEPHONE ENCOUNTER
Clinic Care Coordination Contact  Program: Adding [place]/correcting child gender  County: [facility] Case #:  County Worker: [phone] (Keyshawn)  Mnsure #:   Subscriber #:   Renewal:  Date Applied:     FRW Outreach:   21: FRW received a VM that Nena from the [facility] called patient and sent form for patient to complete about correcting gender and adding . FRW called patient, patient received the form from the [employer] [facility] yesterday. Patient is having children complete form and send back to [employer] [facility] for her. FRW will call patient next week for a status update.   21: FRW called patient to discuss referral. FRW and patient called [facility] to add  to case and correct gender for additional child. FRW received number for case workers and left VM to call FRW or patient directly. FRW will follow up in 1 week.     Health Insurance:      Referral/Screening:  Financial Resource Worker Screening     [facility] Benefits  Is patient requesting help applying for [facility] benefits?: No  Have you recently applied for any [facility] benefits?: No  How many people in your household?: 5  Do you buy/eat food together?: Yes     [place]:  [place] No  Is this an insurance renewal?: No  Is this a new insurance application request?: Yes  Have you recently applied for insurance?: No  How many people in your household? : 5  Do you file taxes?: Yes  How many dependents do you claim?: 4     Any other information for the FRW?: Patient needs help adding , Fabián Barrow to her case. SW attempted to support with this by informing , but haven't heard anything back. Also, patient needs to verify other child's coverage Andrew Barrow because gender may be incorrect. Thank you!!                     Goals Addressed:

## 2021-12-02 NOTE — PROGRESS NOTES
Clinic Care Coordination Contact    Follow Up Progress Note      Assessment: [facility] SW contacted Shaila with an . She reported she hasn't heard from the [facility] yet about adding Fabián to her case. She is also wondering if Andrew has insurance or not, she saw that his gender was incorrect on the insurance.    SW completed FRW referral to support with this.     Care Gaps:    Health Maintenance Due   Topic Date Due     Pneumococcal Vaccine: Pediatrics (0 to 5 Years) and At-Risk Patients (6 to 64 Years) (1 of 4 - PCV13) Never done     COVID-19 Vaccine (1) Never done     INFLUENZA VACCINE (1) 2021       Deferred addressing care gaps due to SW follow up to discuss goal.      Goals addressed this encounter:   Goals Addressed                    This Visit's Progress       Other (pt-stated)         Goal Statement: I need help with solving the medical bill that is over $4,000.00 within the next 1-2 months.      Date goal set: 11-  Measure of Success:   Barriers: language   Strengths: self and spouse  Date to Achieve By: 2022  Patient expressed understanding of goal: yes    Action steps to achieve this goal:  1. I will work with FRW                Intervention/Education provided during outreach: See above     Outreach Frequency: monthly    Plan:   Standard outreach     Financial Resource Worker Screening    [facility] Benefits  Is patient requesting help applying for [facility] benefits?: No  Have you recently applied for any [facility] benefits?: No  How many people in your household?: 5  Do you buy/eat food together?: Yes    Ins[place]:  [place] No  Is this an insurance renewal?: No  Is this a new insurance application request?: Yes  Have you recently applied for insurance?: No  How many people in your household? : 5  Do you file taxes?: Yes  How many dependents do you claim?: 4    Any other information for the FRW?: Patient needs help adding , Fabián Barrow to her case.  SW attempted to support with this by informing , but haven't heard anything back. Also, patient needs to verify other child's coverage Andrew Pushpa because gender may be incorrect. Thank you!!

## 2021-12-08 ENCOUNTER — PRENATAL OFFICE VISIT (OUTPATIENT)
Dept: FAMILY MEDICINE | Facility: CLINIC | Age: 36
End: 2021-12-08
Payer: COMMERCIAL

## 2021-12-08 ENCOUNTER — MEDICAL CORRESPONDENCE (OUTPATIENT)
Dept: HEALTH INFORMATION MANAGEMENT | Facility: CLINIC | Age: 36
End: 2021-12-08

## 2021-12-08 VITALS
BODY MASS INDEX: 25.5 KG/M2 | HEART RATE: 69 BPM | RESPIRATION RATE: 16 BRPM | SYSTOLIC BLOOD PRESSURE: 109 MMHG | DIASTOLIC BLOOD PRESSURE: 73 MMHG | WEIGHT: 122 LBS

## 2021-12-08 DIAGNOSIS — O24.410 DIET CONTROLLED GESTATIONAL DIABETES MELLITUS (GDM) IN THIRD TRIMESTER: Primary | ICD-10-CM

## 2021-12-08 LAB
HBA1C MFR BLD: 5.4 % (ref 0–5.6)
HGB BLD-MCNC: 12.8 G/DL (ref 11.7–15.7)

## 2021-12-08 PROCEDURE — 36415 COLL VENOUS BLD VENIPUNCTURE: CPT | Performed by: FAMILY MEDICINE

## 2021-12-08 PROCEDURE — 0001A PR COVID VAC PFIZER DIL RECON 30 MCG/0.3 ML IM: CPT | Performed by: FAMILY MEDICINE

## 2021-12-08 PROCEDURE — 91300 PR COVID VAC PFIZER DIL RECON 30 MCG/0.3 ML IM: CPT | Performed by: FAMILY MEDICINE

## 2021-12-08 PROCEDURE — 99207 PR POST PARTUM EXAM: CPT | Performed by: FAMILY MEDICINE

## 2021-12-08 PROCEDURE — 83036 HEMOGLOBIN GLYCOSYLATED A1C: CPT | Performed by: FAMILY MEDICINE

## 2021-12-08 PROCEDURE — 85018 HEMOGLOBIN: CPT | Performed by: FAMILY MEDICINE

## 2021-12-09 ENCOUNTER — TELEPHONE (OUTPATIENT)
Dept: FAMILY MEDICINE | Facility: CLINIC | Age: 36
End: 2021-12-09
Payer: COMMERCIAL

## 2021-12-09 NOTE — TELEPHONE ENCOUNTER
----- Message from Beau Mancia MD sent at 12/9/2021 11:08 AM CST -----  Please let her know no signs of diabetes.

## 2021-12-15 ENCOUNTER — PATIENT OUTREACH (OUTPATIENT)
Dept: CARE COORDINATION | Facility: CLINIC | Age: 36
End: 2021-12-15
Payer: COMMERCIAL

## 2021-12-15 ENCOUNTER — APPOINTMENT (OUTPATIENT)
Dept: INTERPRETER SERVICES | Facility: CLINIC | Age: 36
End: 2021-12-15
Payer: COMMERCIAL

## 2021-12-15 NOTE — TELEPHONE ENCOUNTER
Clinic Care Coordination Contact  Program: Adding [place]/correcting child gender  County: [facility] Case #:  County Worker: [phone] (Nena and Selene)  Mnsure #:   Subscriber #:   Renewal:  Date Applied:     FRW Outreach:   12/15/21: FRW called patient. Patient has forms complete and dropping off at [facility]. FRW will follow up with patient in 2-3 weeks.   21: FRW received a VM that Nena from the [facility] called patient and sent form for patient to complete about correcting gender and adding . FRW called patient, patient received the form from the [facility] yesterday. Patient is having children complete form and send back to [facility] for her. FRW will call patient next week for a status update.   21: FRW called patient to discuss referral. FRW and patient called [facility] to add  to case and correct gender for additional child. FRW received number for case workers and left VM to call FRW or patient directly. FRW will follow up in 1 week.     Health Insurance:      Referral/Screening:  Financial Resource Worker Screening     [facility] Benefits  Is patient requesting help applying for [facility] benefits?: No  Have you recently applied for any [facility] benefits?: No  How many people in your household?: 5  Do you buy/eat food together?: Yes     [place]:  [place] No  Is this an insurance renewal?: No  Is this a new insurance application request?: Yes  Have you recently applied for insurance?: No  How many people in your household? : 5  Do you file taxes?: Yes  How many dependents do you claim?: 4     Any other information for the FRW?: Patient needs help adding , Fabián Barrow to her case. SW attempted to support with this by informing , but haven't heard anything back. Also, patient needs to verify other child's coverage Andrew Barrow because gender may be incorrect. Thank you!!                     Goals Addressed:

## 2021-12-15 NOTE — PROGRESS NOTES
Clinic Care Coordination Contact:    Patient is due for CCC CHW follow up.   CCC FRW already contacted pt today per pt chart reviewed.  Deferred CHW outreach to 2-3 weeks.     CHW Next Follow-up: goals follow up     Outreach frequency: monthly     CHW Plan: 1-

## 2021-12-17 ENCOUNTER — ALLIED HEALTH/NURSE VISIT (OUTPATIENT)
Dept: FAMILY MEDICINE | Facility: CLINIC | Age: 36
End: 2021-12-17
Payer: COMMERCIAL

## 2021-12-17 DIAGNOSIS — Z23 NEED FOR VACCINATION: Primary | ICD-10-CM

## 2021-12-17 PROCEDURE — 90471 IMMUNIZATION ADMIN: CPT

## 2021-12-17 PROCEDURE — 90686 IIV4 VACC NO PRSV 0.5 ML IM: CPT

## 2021-12-17 PROCEDURE — 99207 PR NO CHARGE NURSE ONLY: CPT

## 2021-12-23 ENCOUNTER — PATIENT OUTREACH (OUTPATIENT)
Dept: CARE COORDINATION | Facility: CLINIC | Age: 36
End: 2021-12-23
Payer: COMMERCIAL

## 2021-12-23 NOTE — PROGRESS NOTES
Clinic Care Coordination Contact    Situation: Patient chart reviewed by care coordinator.    Background: SW completed chart review    Assessment: Patient working with FRW    Plan/Recommendations: Standard outreach

## 2021-12-30 ENCOUNTER — ALLIED HEALTH/NURSE VISIT (OUTPATIENT)
Dept: FAMILY MEDICINE | Facility: CLINIC | Age: 36
End: 2021-12-30
Payer: COMMERCIAL

## 2021-12-30 DIAGNOSIS — Z23 ENCOUNTER FOR IMMUNIZATION: Primary | ICD-10-CM

## 2021-12-30 PROCEDURE — 99207 PR NO CHARGE NURSE ONLY: CPT

## 2021-12-30 PROCEDURE — 91300 PR COVID VAC PFIZER DIL RECON 30 MCG/0.3 ML IM: CPT

## 2021-12-30 PROCEDURE — 0002A PR COVID VAC PFIZER DIL RECON 30 MCG/0.3 ML IM: CPT

## 2022-01-01 NOTE — TELEPHONE ENCOUNTER
I was called on abnormal OB U/S results this morning.    Intrauterine gestational sac. Fetal pole is present with a crown-rump length correspond to 7 weeks 5 days gestation. No cardiac motion is seen and clinical dates are 10 weeks 4 days findings are consistent with a nonviable pregnancy.     I spoke with patient and her  at radiology, with a phone .  Reviewed U/S results, discussed non-viable pregnancy.  Condolences expressed.  She has not had any pain or bleeding/spotting.  We discussed close monitoring versus OBGYN referral.  She would like OBGYN referral.  Referral made today, I requested she be seen within 1 week.  I discussed warning signs to look for over the next few days/weekend, such as significant heavy bleeding, syncope, etc. other concerns.  All of her questions were answered.  Blood type O Pos.  
yes

## 2022-01-04 ENCOUNTER — PATIENT OUTREACH (OUTPATIENT)
Dept: CARE COORDINATION | Facility: CLINIC | Age: 37
End: 2022-01-04
Payer: COMMERCIAL

## 2022-01-04 NOTE — TELEPHONE ENCOUNTER
Clinic Care Coordination Contact  Program: Adding [place]/correcting child gender  County: [facility] Case #:  [facility] Worker: [phone] (Nena and Selene)  Mnsure #:   Subscriber #:   Renewal:  Date Applied:     FRW Outreach:   22: FRW called patient to check on status of  insurance and correction of gender of other child. Patient explained she sent in forms but hasn't heard back from [facility]. FRW and patient will connect in 2 weeks.   12/15/21: FRW called patient. [facility]W will follow up with patient in 2-3 weeks.   21: FRW received a VM that Nena from the [facility] called patient and sent form for patient to complete about correcting gender and adding . FRW called patient, [facility]. Patient is having children complete [facility] and [facility]. FRW will call patient next week for a status update.   21: FRW called patient to discuss referral. FRW and patient called [facility] to add  to case and correct gender for additional child. FRW received number for case workers and left VM to call FRW or patient directly. FRW will follow up in 1 week.     Health Insurance:      Referral/Screening:  Financial Resource Worker Screening     [facility] Benefits  Is patient requesting help [facility] benefits?: No  Have you recently applied [facility] any [facility] benefits?: No  How many people in your household?: 5  Do you buy/eat food together?: Yes     Insurance:  [place]?: No  Is this an insurance renewal?: No  Is this a new insurance application request?: Yes  Have you recently applied for insurance?: No  How many people in your household? : 5  Do you file taxes?: Yes  How many dependents do you claim?: 4     Any other information for the FRW?: Patient needs help adding , Fabián Barrow to her case. SW attempted to support with this by  informing , but haven't heard anything back. Also, patient needs to verify other child's coverage Andrew Pushpa because gender may be incorrect. Thank you!!                     Goals Addressed:

## 2022-01-05 ENCOUNTER — PATIENT OUTREACH (OUTPATIENT)
Dept: CARE COORDINATION | Facility: CLINIC | Age: 37
End: 2022-01-05
Payer: COMMERCIAL

## 2022-01-05 NOTE — PROGRESS NOTES
"Clinic Care Coordination Contact:    CHW plan to connect with patient today for CCC follow up.  Per pt chart reviewed; CCC FRW spoke with patient on 1-. \"FRW and patient will connect in 2 weeks\" per appt notes reviewed. Patient is working with FRW at this time. Defer CHW outreach to 2-3 weeks.     CHW Plan: 1-      "

## 2022-01-18 ENCOUNTER — PATIENT OUTREACH (OUTPATIENT)
Dept: CARE COORDINATION | Facility: CLINIC | Age: 37
End: 2022-01-18
Payer: COMMERCIAL

## 2022-01-18 ENCOUNTER — APPOINTMENT (OUTPATIENT)
Dept: INTERPRETER SERVICES | Facility: CLINIC | Age: 37
End: 2022-01-18
Payer: COMMERCIAL

## 2022-01-18 NOTE — TELEPHONE ENCOUNTER
Clinic Care Coordination Contact  Program: Adding [place]/correcting child gender  County: [facility] Case #:  [facility] Worker: [phone] (Nena and Selene)  Mnsure #:   Subscriber #:   Renewal:  Date Applied:     FRW Outreach:   **ADD [facility] BENEFITS ON REFERRAL AT NEXT OUTREACH**  22: FRW called patient to get status of insurance. FRW left VM and will make another outreach in 1-2 weeks.   22: FRW called patient to check on status of  insurance and correction of gender of other child. Patient explained she sent in forms but hasn't heard back from [facility]. FRW and patient will connect in 2 weeks.   12/15/21: FRW called patient. Patient has forms complete and dropping off at [facility]. FRW will follow up with patient in 2-3 weeks.   21: FRW received a VM that Nena from the [facility] called patient and sent form for patient to complete about correcting gender and adding . FRW called patient, patient received the form from the [facility] yesterday. Patient is having children complete form and send back to [facility] for her. FRW will call patient next week for a status update.   21: FRW called patient to discuss referral. FRW and patient called [facility] to add  to case and correct gender for additional child. FRW received number for case workers and left  to call FRW or patient directly. FRW will follow up in 1 week.     Health Insurance:      Referral/Screening:  Financial Resource Worker Screening     [facility] Benefits  Is patient requesting help applying for [facility] benefits?: No  Have you recently applied for any [facility] benefits?: No  How many people in your household?: 5  Do you buy/eat food together?: Yes     Insurance:  [place]?: No  Is this an insurance renewal?: No  Is this a new insurance application request?: Yes  Have you recently applied for insurance?: No  How many people in your household? : 5  Do you file taxes?: Yes  How  many dependents do you claim?: 4     Any other information for the FRW?: Patient needs help adding , Fabián Barrow to her case. SW attempted to support with this by informing , but haven't heard anything back. Also, patient needs to verify other child's coverage Andrew Barrow because gender may be incorrect. Thank you!!                     Goals Addressed:

## 2022-01-26 ENCOUNTER — PATIENT OUTREACH (OUTPATIENT)
Dept: CARE COORDINATION | Facility: CLINIC | Age: 37
End: 2022-01-26
Payer: COMMERCIAL

## 2022-01-26 NOTE — PROGRESS NOTES
Clinic Care Coordination Contact  [facility]/Voicemail    Reason: CCC CHW Follow Up Called     Clinical Data: Care Coordinator Outreach:  Outreach attempted x 1:  Left message on patient's voicemail with call back information and requested return call.  Plan: Care Coordinator will try to reach patient again in 10 business days.

## 2022-02-03 NOTE — PROGRESS NOTES
"Clinic Care Coordination Contact:    Message received from [facility] SW:   \"Daniel Flores,   It looks like you tried to reach Shaila Savage on 1/26. When you talk with her can you schedule her with me to talk about Fabián's social security card? Thank you!\"    CHW: at next CHW outreach follow up, offer follow up appt with [facility] SARATH.    "

## 2022-02-08 ENCOUNTER — PATIENT OUTREACH (OUTPATIENT)
Dept: CARE COORDINATION | Facility: CLINIC | Age: 37
End: 2022-02-08
Payer: COMMERCIAL

## 2022-02-08 NOTE — PROGRESS NOTES
Clinic Care Coordination Contact    Follow Up Progress Note      Assessment: [facility] SARATH contacted Shaila by phone with an .    She needs help getting Fabián a social security card. SW completed application and it is at clinic for her to sign when she comes in on . She will bring his birth certificate and SW will mail on Monday when in clinic next.     [place]  is incorrect with the [facility]. They put his  as 2021 and it should be 2021. SARATH will message FRW about this for support.     Care Gaps:    Health Maintenance Due   Topic Date Due     Pneumococcal Vaccine: Pediatrics (0 to 5 Years) and At-Risk Patients (6 to 64 Years) (1 of 4 - PCV13) Never done     PHQ-2  2022       Deferred addressing care gaps due to SW follow up to discuss goal.      Goals addressed this encounter:   Goals Addressed                    This Visit's Progress       Financial Wellbeing (pt-stated)         Goal Statement: I want to connect to SNAP and/or other financial supports through the [facility] within one month  Date Goal set: 22  Barriers: Language  Strengths:   Date to Achieve By: 3/30/2022  Patient expressed understanding of goal: Yes  Action steps to achieve this goal:  1. I will work with FRW             COMPLETED: Other (pt-stated)         My balance is now $0         Problem Solving (pt-stated)         Goal Statement: I want to correct my son, Annalisa,  with the [facility] within one month  Date Goal set: 22  Barriers: Language  Strengths:   Date to Achieve By: 3/30/2022  Patient expressed understanding of goal: Yes  Action steps to achieve this goal:  1. I will work with FRW           Problem Solving (pt-stated)         Goal Statement: I want to have a Social Security Card for my son, Fabián within 1-2 months  Date Goal set: 22  Barriers: Language  Strengths:   Date to Achieve By: 2022  Patient expressed understanding of goal: Yes  Action steps to achieve this goal:  1. I will sign  the SSA application on 2/9 and drop off Florien's birth certificate for SW  2. [facility] SW will mail this application to [facility]   3. I will update CCC team              Intervention/Education provided during outreach: See above     Outreach Frequency: monthly    Plan:   SW will mail SSA application on Monday

## 2022-02-09 ENCOUNTER — PATIENT OUTREACH (OUTPATIENT)
Dept: CARE COORDINATION | Facility: CLINIC | Age: 37
End: 2022-02-09
Payer: COMMERCIAL

## 2022-02-09 NOTE — PROGRESS NOTES
Clinic Care Coordination Contact:    Patient is due for [facility] CHW follow up. CHW plan to connect with patient today.  Per pt chart reviewed; [facility] SW contacted patient on 2- already.  Defer CHW outreach to 3-4 weeks.     Plan: CHW outreach to patient in the next 3-4 weeks.

## 2022-02-11 ENCOUNTER — PATIENT OUTREACH (OUTPATIENT)
Dept: CARE COORDINATION | Facility: CLINIC | Age: 37
End: 2022-02-11
Payer: COMMERCIAL

## 2022-02-11 NOTE — TELEPHONE ENCOUNTER
Clinic Care Coordination Contact  Program: Adding [place]/correcting child gender/County Benefits   County: [facility] Case #:  County Worker: [phone] (Nena and Selene)  Mnsure #:   Subscriber #:   Renewal:  Date Applied:     FRW Outreach:   22: FRW called pt to help correct  for  and set up appointment for [facility] benefits.Fabián Barrow Birthday is 2019. Appointment for [facility] benefit application set for . FRW left VM for [facility] workers with correct  for Fabián.  **ADD [facility] BENEFITS ON REFERRAL AT NEXT OUTREACH**  22: FRW called patient to get status of insurance. FRW left VM and will make another outreach in 1-2 weeks.   22: FRW called patient to check on status of  insurance and correction of gender of other child. Patient explained she sent in forms but hasn't heard back from [facility]. FRW and patient will connect in 2 weeks.   12/15/21: FRW called patient. Patient has forms complete and dropping off at [facility]. FRW will follow up with patient in 2-3 weeks.   21: FRW received a VM that Nena from the [facility] called patient and sent form for patient to complete about correcting gender and adding . FRW called patient, patient received the form from the [facility] yesterday. Patient is having children complete form and send back to [facility] for her. FRW will call patient next week for a status update.   21: FRW called patient to discuss referral. FRW and patient called [facility] to add  to case and correct gender for additional child. FRW received number for case workers and left VM to call FRW or patient directly. FRW will follow up in 1 week.     Health Insurance:      Referral/Screening:  Financial Resource Worker Screening     [facility] Benefits  Is patient requesting help applying for [facility] benefits?: No  Have you recently applied for any [facility] benefits?: No  How many people in your household?: 5  Do you buy/eat food together?:  Yes     Insurance:  [place]?: No  Is this an insurance renewal?: No  Is this a new insurance application request?: Yes  Have you recently applied for insurance?: No  How many people in your household? : 5  Do you file taxes?: Yes  How many dependents do you claim?: 4     Any other information for the FRW?: Patient needs help adding , Fabián Barrow to her case. SW attempted to support with this by informing , but haven't heard anything back. Also, patient needs to verify other child's coverage Andrew Barrow because gender may be incorrect. Thank you!!                     Goals Addressed:

## 2022-02-14 ENCOUNTER — PATIENT OUTREACH (OUTPATIENT)
Dept: CARE COORDINATION | Facility: CLINIC | Age: 37
End: 2022-02-14
Payer: COMMERCIAL

## 2022-02-14 NOTE — PROGRESS NOTES
Clinic Care Coordination Contact  Care Team Conversations    CCC SW mailed SSA application with medical record and birth certificate from clinic on todays date.

## 2022-02-22 ENCOUNTER — PATIENT OUTREACH (OUTPATIENT)
Dept: CARE COORDINATION | Facility: CLINIC | Age: 37
End: 2022-02-22

## 2022-02-22 ENCOUNTER — APPOINTMENT (OUTPATIENT)
Dept: CARE COORDINATION | Facility: CLINIC | Age: 37
End: 2022-02-22
Payer: COMMERCIAL

## 2022-02-22 NOTE — TELEPHONE ENCOUNTER
Clinic Care Coordination Contact  Program: Adding [place]/correcting child gender/County Benefits   County: [facility] Case #:  County Worker: [phone] (Nena and Selene)  Mnsure #:   Subscriber #:   Renewal:  Date Applied:     FRW Outreach:    3/4/22: Call pt at 1 pm on 22: FRW called pt. Pt hasn't received the application yet. FRW will check back with pt on Friday.   22: FRW called pt and sent out the [facility] combined application for pt. FRW will call pt next week to make sure she received application.   22: FRW called pt to help correct  for  and set up appointment for [facility] benefits.Fabián Barrow Birthday is 2019. Appointment for [facility] benefit application set for . FRW left VM for [facility] workers with correct  for Fabián.  22: FRW called patient to get status of insurance. FRW left VM and will make another outreach in 1-2 weeks.   22: FRW called patient to check on status of  insurance and correction of gender of other child. Patient explained she sent in forms but hasn't heard back from [facility]. FRW and patient will connect in 2 weeks.   12/15/21: FRW called patient. Patient has forms complete and dropping off at [facility]. FRW will follow up with patient in 2-3 weeks.   21: FRW received a VM that Nena from the [facility] called patient and sent form for patient to complete about correcting gender and adding . FRW called patient, patient received the form from the [facility] yesterday. Patient is having children complete form and send back to [facility] for her. FRW will call patient next week for a status update.   21: FRW called patient to discuss referral. FRW and patient called [facility] to add  to case and correct gender for additional child. FRW received number for case workers and left VM to call FRW or patient directly. FRW will follow up in 1 week.     Health Insurance:      Referral/Screening:  Financial  Resource Worker Screening     [facility] Benefits  Is patient requesting help applying for [facility] benefits?: No  Have you recently applied for any [facility] benefits?: No  How many people in your household?: 5  Do you buy/eat food together?: Yes     Insurance:  [place]?: No  Is this an insurance renewal?: No  Is this a new insurance application request?: Yes  Have you recently applied for insurance?: No  How many people in your household? : 5  Do you file taxes?: Yes  How many dependents do you claim?: 4     Any other information for the FRW?: Patient needs help adding , Fabián Barrow to her case. SW attempted to support with this by informing , but haven't heard anything back. Also, patient needs to verify other child's coverage Andrew Barrow because gender may be incorrect. Thank you!!                     Goals Addressed:

## 2022-03-02 ENCOUNTER — PATIENT OUTREACH (OUTPATIENT)
Dept: NURSING | Facility: CLINIC | Age: 37
End: 2022-03-02
Payer: COMMERCIAL

## 2022-03-02 NOTE — PROGRESS NOTES
Clinic Care Coordination Contact  Community Health Worker Follow Up    Care Gaps:   Health Maintenance Due   Topic Date Due     Pneumococcal Vaccine: Pediatrics (0 to 5 Years) and At-Risk Patients (6 to 64 Years) (1 of 4 - PCV13) Never done     PHQ-2  2022     Not address due to CHW following up on goals at this time.    Goals:   Goals Addressed as of 3/2/2022 at 10:45 AM                    Today       Financial Wellbeing (pt-stated)   20%    Added 22 by Rey Malik [facility]      Goal Statement: I want to connect to SNAP and/or other financial supports through the [facility] within one month.    Date Goal set: 22  Barriers: Language  Strengths:   Date to Achieve By: 3/30/2022  Patient expressed understanding of goal: Yes  Action steps to achieve this goal:  1. I will continue to work with FRW.  2. I will update status with [facility] team at next outreach follow up.    (Updated: 3-)             Problem Solving (pt-stated)   50%    [facility]      Goal Statement: I want to have a Social Security Card for my son, Fabián within 1-2 months.    Date Goal set: 22  Barriers: Language  Strengths:   Date to Achieve By: 2022  Patient expressed understanding of goal: Yes  Action steps to achieve this goal:  1. I will wait to hear from the [facility] office for the next step.     Note/comment:   -CCC mailed patient medical record and birth certificate to [facility] office on 2022. Please see [facility] SW notes encounter dated 2022 for details if need.     (Updated: 3-)              Discussion:   [facility] CHW was able to connect with patient today. Updated above goals. Patient shared info below. Pt follow up Fabián birth certificate dropped it off to [facility] for [facility] on 2022. CHW relayed [facility] SW notes encounter 2022 with patient and suggested patient to wait to hear from [facility] office regarding to her baby Fabián (: 2019) social security card and continue to work with  "CCC FRW for \"adding /correcting child gender/County Benefits\" per FRW notes reviewed encounter 2022. Pt is informed to connect with CCC team for any additional resources need.        Pt shared:   -Received child Fabián birth certificate and health care insurance card from the [facility].   -Waiting for Melissa social security card. Hearing no updates.   -Doing good. No other questions or concerns at this time.     CHW Next Follow-up: Goals follow up. Address care gaps.     Outreach frequency: monthly     CHW Plan: 2022    "

## 2022-03-08 ENCOUNTER — PATIENT OUTREACH (OUTPATIENT)
Dept: CARE COORDINATION | Facility: CLINIC | Age: 37
End: 2022-03-08
Payer: COMMERCIAL

## 2022-03-08 NOTE — LETTER
[facility]  Patient Centered Plan of Care  About Me:        Patient Name:  Shaila Savage    YOB: 1985  Age:         36 year old   Wiliam MRN:    [id number] Telephone Information:  Home Phone [phone]   Mobile [phone]       Address:  [address] [place] [address]  [place] [address] Email address:  [email or web address]      Emergency Contact(s)    Name Relationship Lgl Grd Work Phone Home Phone Mobile Phone   BJ HARLEY Spouse    [phone]           Primary language:  Hmong     needed? Yes   [facility]:  [phone] op. 1  Other communication barriers:No data recorded  Preferred Method of Communication:     Current living arrangement: No data recorded  Mobility Status/ Medical Equipment: No data recorded      Health Maintenance  Health Maintenance Reviewed: No data recorded    My Access Plan  Medical Emergency 911   Primary Clinic Line   - [phone]   24 Hour Appointment Line [phone] or  [phone] ([phone]) (toll-free)   24 Hour Nurse Line [phone] (toll-free)   Preferred Urgent Care No data recorded   Preferred Hospital No data recorded   [id number] [place], [place] - [address] AT [facility] [address]     Behavioral Health Crisis Line The National Suicide Prevention Lifeline at 1-430.656.6354 or 911             My Care Team Members  Patient Care Team       Relationship Specialty Notifications Start End    Selene Rae MD PCP - General Family Medicine  6/30/21     Phone: [phone] Fax: [phone]         [address] [place] [address]    Sandra Fields MA Community Health Worker Primary Care - CC Admissions 2/26/21     Rey Malik LICSW Lead Care Coordinator Primary Care - CC Admissions 2/26/21     Selene Rae MD Assigned PCP   6/16/21     Phone: [phone] Fax: [phone]         [address] [place] [address]    Claire Laura Financial Resource Worker   12/2/21              My Care Plans  Self Management and Treatment Plan  Goals and (Comments)  Goals        General     Financial Wellbeing (pt-stated)      Notes - Note edited  3/2/2022 10:45 AM by Sandra Fields MA     Goal Statement: I want to connect to SNAP and/or other financial supports through the [facility] within one month.    Date Goal set: 22  Barriers: Language  Strengths:   Date to Achieve By: 3/30/2022  Patient expressed understanding of goal: Yes  Action steps to achieve this goal:  1. I will continue to work with FRW.  2. I will update status with [facility] team at next outreach follow up.    (Updated: 3-)           Problem Solving (pt-stated)      Notes - Note created  2022  9:41 AM by Rey Malik [facility]     Goal Statement: I want to correct my son, Annalisa,  with the [facility] within one month  Date Goal set: 22  Barriers: Language  Strengths:   Date to Achieve By: 3/30/2022  Patient expressed understanding of goal: Yes  Action steps to achieve this goal:  1. I will work with FRW         Problem Solving (pt-stated)      Notes - Note edited  3/2/2022 10:43 AM by Sandra Fields MA     Goal Statement: I want to have a Social Security Card for my son, Fabián within 1-2 months.    Date Goal set: 22  Barriers: Language  Strengths:   Date to Achieve By: 2022  Patient expressed understanding of goal: Yes  Action steps to achieve this goal:  1. I will wait to hear from the [facility] office for the next step.     Note/comment:   -CCC mailed patient medical record and birth certificate to [facility] office on 2022. Please see [facility] SW notes encounter dated 2022 for details if need.     (Updated: 3-)               Action Plans on File:                       Advance Care Plans/Directives Type:   No data recorded    My Medical and Care Information  Problem List   Patient Active Problem List   Diagnosis     Acne vulgaris     Female stress incontinence     Supervision of high risk pregnancy in third  trimester     Class 1 obesity due to excess calories without serious comorbidity with body mass index (BMI) of 32.0 to 32.9 in adult     Microcytosis     Alpha thalassemia minor trait     Diet controlled gestational diabetes mellitus (GDM) in third trimester     Normal delivery      Current Medications and Allergies:  See printed Medication Report.    Care Coordination Start Date: 2/26/2021   Frequency of Care Coordination: monthly     Form Last Updated: 03/08/2022

## 2022-03-10 ENCOUNTER — PATIENT OUTREACH (OUTPATIENT)
Dept: CARE COORDINATION | Facility: CLINIC | Age: 37
End: 2022-03-10
Payer: COMMERCIAL

## 2022-03-10 NOTE — TELEPHONE ENCOUNTER
Clinic Care Coordination Contact  Program: Adding [place]/correcting child gender/County Benefits   County: [facility] Case #:  County Worker: [phone] (Nena and Seleen)  Mnsure #:   Subscriber #:   Renewal:  Date Applied:     FRW Outreach:    3/10/22: FRW called pt to make sure she has combined application. Patient does not. FRW confirmed her address. FRW will connect with pt the week of the  as FRW will be gone next week.   3/4/22: FRW called pt and re-sent application.    22: FRW called pt. Pt hasn't received the application yet. FRW will check back with pt on Friday.   22: FRW called pt and sent out the [facility] combined application for pt. FRW will call pt next week to make sure she received application.   22: FRW called pt to help correct  for  and set up appointment for [facility] benefits.Fabián Barrow Birthday is 2019. Appointment for [facility] benefit application set for . FRW left VM for [facility] workers with correct  for Fabián.  22: FRW called patient to get status of insurance. FRW left VM and will make another outreach in 1-2 weeks.   22: FRW called patient to check on status of  insurance and correction of gender of other child. Patient explained she sent in forms but hasn't heard back from [facility]. FRW and patient will connect in 2 weeks.   12/15/21: FRW called patient. Patient has forms complete and dropping off at [facility]. FRW will follow up with patient in 2-3 weeks.   21: FRW received a VM that Nena from the [facility] called patient and sent form for patient to complete about correcting gender and adding . FRW called patient, patient received the form from the [facility] yesterday. Patient is having children complete form and send back to [facility] for her. FRW will call patient next week for a status update.   21: FRW called patient to discuss referral. FRW and patient called [facility] to add  to case and correct  gender for additional child. FRW received number for case workers and left VM to call FRW or patient directly. FRW will follow up in 1 week.     Health Insurance:      Referral/Screening:  Financial Resource Worker Screening     County Benefits  Is patient requesting help applying for county benefits?: No  Have you recently applied for any county benefits?: No  How many people in your household?: 5  Do you buy/eat food together?: Yes     [place]:  [place] No  Is this an insurance renewal?: No  Is this a new insurance application request?: Yes  Have you recently applied for insurance?: No  How many people in your household? : 5  Do you file taxes?: Yes  How many dependents do you claim?: 4     Any other information for the FRW?: Patient needs help adding , Fabián Barrow to her case. SW attempted to support with this by informing , but haven't heard anything back. Also, patient needs to verify other child's coverage Andrew Barrow because gender may be incorrect. Thank you!!                     Goals Addressed:

## 2022-03-17 ENCOUNTER — PATIENT OUTREACH (OUTPATIENT)
Dept: CARE COORDINATION | Facility: CLINIC | Age: 37
End: 2022-03-17
Payer: COMMERCIAL

## 2022-03-17 NOTE — PROGRESS NOTES
Clinic Care Coordination Contact    Follow Up Progress Note      Assessment: CCC SW received message patient is trying to reach . SW contacted Shaila with an .    She is wondering what the status of Fabián's social security card is, she hasn't received anything yet. She wanted us to call. SW explained we can't call the [facility] office because they took their phone number down from the website. They have been having phone troubles. SW explained there have been some people reporting success with going to the office recently. She will try to do this.     Care Gaps:    Health Maintenance Due   Topic Date Due     Pneumococcal Vaccine: Pediatrics (0 to 5 Years) and At-Risk Patients (6 to 64 Years) (1 of 4 - PCV13) Never done     PHQ-2 (once per calendar year)  01/01/2022       Deferred addressing care gaps due to SW follow up to discuss goal.      Goals addressed this encounter:   Goals Addressed                    This Visit's Progress       Problem Solving (pt-stated)         Goal Statement: I want to have a Social Security Card for my son, Fabián within 1-2 months.    Date Goal set: 02/08/22  Barriers: Language  Strengths:   Date to Achieve By: 4/30/2022  Patient expressed understanding of goal: Yes  Action steps to achieve this goal:  1. I will wait to hear from the [facility] office for the next step.   2. [facility]    Note/comment:   -CCC mailed patient medical record and birth certificate to [facility] office on 2/14/2022. Please see [facility] SW notes encounter dated 2/14/2022 for details if need.     (Updated: 3-)              Intervention/Education provided during outreach: See above     Outreach Frequency: monthly    Plan:   Standard outreach

## 2022-03-23 ENCOUNTER — PATIENT OUTREACH (OUTPATIENT)
Dept: CARE COORDINATION | Facility: CLINIC | Age: 37
End: 2022-03-23
Payer: COMMERCIAL

## 2022-03-23 NOTE — TELEPHONE ENCOUNTER
Clinic Care Coordination Contact  Program: Adding [place]/correcting child gender/County Benefits   County: [facility] Case #:  [facility] Worker: [phone] (Nena and Selene)  Mnsure #:   Subscriber #:   Renewal:  Date Applied:     FRW Outreach:    3/23/22: FRW called pt. Pt received the application and sending to [facility] this week. FRW and pt will connect in 3-4 weeks to see what [facility] has sent back to pt.   3/10/22: FRW called pt to make sure she has combined application. Patient does not. FRW confirmed her address. FRW will connect with pt the week of the  as FRW will be gone next week.   3/4/22: FRW called pt and re-sent application.    22: FRW called pt. Pt hasn't received the application yet. FRW will check back with pt on Friday.   22: FRW called pt and sent out the [facility] combined application for pt. FRW will call pt next week to make sure she received application.   22: FRW called pt to help correct  for  and set up appointment for [facility] benefits.Fabián Barrow Birthday is 2019. Appointment for [facility] benefit application set for . FRW left VM for [facility] workers with correct  for Fabián.  22: FRW called patient to get status of insurance. FRW left VM and will make another outreach in 1-2 weeks.   22: FRW called patient to check on status of  insurance and correction of gender of other child. Patient explained she sent in forms but hasn't heard back from [facility]. FRW and patient will connect in 2 weeks.   12/15/21: FRW called patient. Patient has forms complete and dropping off at [facility]. FRW will follow up with patient in 2-3 weeks.   21: FRW received a VM that Nena from the [facility] called patient and sent form for patient to complete about correcting gender and adding . FRW called patient, patient received the form from the [facility] yesterday. Patient is having children complete form and send back to [facility] for her. FRW will call  patient next week for a status update.   21: FRW called patient to discuss referral. FRW and patient called [facility] to add  to case and correct gender for additional child. FRW received number for case workers and left  to call FRW or patient directly. FRW will follow up in 1 week.     Health Insurance:      Referral/Screening:  Financial Resource Worker Screening     [facility] Benefits  Is patient requesting help applying for [facility] benefits?: No  Have you recently applied for any [facility] benefits?: No  How many people in your household?: 5  Do you buy/eat food together?: Yes     [place]:  [place] No  Is this an insurance renewal?: No  Is this a new insurance application request?: Yes  Have you recently applied for insurance?: No  How many people in your household? : 5  Do you file taxes?: Yes  How many dependents do you claim?: 4     Any other information for the FRW?: Patient needs help adding , Fabián Barrow to her case. SW attempted to support with this by informing , but haven't heard anything back. Also, patient needs to verify other child's coverage Andrew Barrow because gender may be incorrect. Thank you!!                     Goals Addressed:

## 2022-04-14 ENCOUNTER — PATIENT OUTREACH (OUTPATIENT)
Dept: NURSING | Facility: CLINIC | Age: 37
End: 2022-04-14
Payer: COMMERCIAL

## 2022-04-14 NOTE — PROGRESS NOTES
Clinic Care Coordination Contact  [facility]/Voicemail    Reason: CCC CHW Follow Up Called     Clinical Data: Care Coordinator Outreach:  Outreach attempted x 1: CHW attempted to connect with the patient at both home and mobile phone numbers today for follow up and was unable to reach patient at this time due to no answer, no voicemail available and or not available.   Plan: Care Coordinator will try to reach patient again in the next 2 weeks.

## 2022-04-19 ENCOUNTER — PATIENT OUTREACH (OUTPATIENT)
Dept: CARE COORDINATION | Facility: CLINIC | Age: 37
End: 2022-04-19
Payer: COMMERCIAL

## 2022-04-26 ENCOUNTER — APPOINTMENT (OUTPATIENT)
Dept: INTERPRETER SERVICES | Facility: CLINIC | Age: 37
End: 2022-04-26
Payer: COMMERCIAL

## 2022-04-26 ENCOUNTER — PATIENT OUTREACH (OUTPATIENT)
Dept: CARE COORDINATION | Facility: CLINIC | Age: 37
End: 2022-04-26
Payer: COMMERCIAL

## 2022-04-26 NOTE — TELEPHONE ENCOUNTER
Clinic Care Coordination Contact  Program: Adding [place]/correcting child gender/County Benefits   County: [facility] Case #:  [facility] Worker: [phone] (Nena and Selene)  Mnsure #:   Subscriber #:   Renewal:  Date Applied:     FRW Outreach:    22: FRW called pt and left VM. FRW will make outreach in 1-2 weeks.   3/23/22: FRW called pt. Pt received the application and sending to [facility] this week. FRW and pt will connect in 3-4 weeks to see what [facility] has sent back to pt.   3/10/22: FRW called pt to make sure she has combined application. Patient does not. FRW confirmed her address. FRW will connect with pt the week of the  as FRW will be gone next week.   3/4/22: FRW called pt and re-sent application.    22: FRW called pt. Pt hasn't received the application yet. FRW will check back with pt on Friday.   22: FRW called pt and sent out the [facility] combined application for pt. FRW will call pt next week to make sure she received application.   22: FRW called pt to help correct  for  and set up appointment for [facility] benefits.Fabián Barrow Birthday is 2019. Appointment for [facility] benefit application set for . FRW left VM for [facility] workers with correct  for Fabián.  22: FRW called patient to get status of insurance. FRW left VM and will make another outreach in 1-2 weeks.   22: FRW called patient to check on status of  insurance and correction of gender of other child. Patient explained she sent in forms but hasn't heard back from [facility]. FRW and patient will connect in 2 weeks.   12/15/21: FRW called patient. Patient has forms complete and dropping off at [facility]. FRW will follow up with patient in 2-3 weeks.   21: FRW received a VM that Nena from the [facility] called patient and sent form for patient to complete about correcting gender and adding . FRW called patient, patient received the form from the [facility] yesterday. Patient is  having children complete form and send back to [facility] for her. FRW will call patient next week for a status update.   21: FRW called patient to discuss referral. FRW and patient called [facility] to add  to case and correct gender for additional child. FRW received number for case workers and left  to call FRW or patient directly. FRW will follow up in 1 week.     Health Insurance:      Referral/Screening:  Financial Resource Worker Screening     [facility] Benefits  Is patient requesting help applying for [facility] benefits?: No  Have you recently applied for any [facility] benefits?: No  How many people in your household?: 5  Do you buy/eat food together?: Yes     [place]:  [place] No  Is this an insurance renewal?: No  Is this a new insurance application request?: Yes  Have you recently applied for insurance?: No  How many people in your household? : 5  Do you file taxes?: Yes  How many dependents do you claim?: 4     Any other information for the FRW?: Patient needs help adding , Fabián Barrow to her case. SW attempted to support with this by informing , but haven't heard anything back. Also, patient needs to verify other child's coverage Andrew Barrow because gender may be incorrect. Thank you!!                     Goals Addressed:

## 2022-04-28 ENCOUNTER — PATIENT OUTREACH (OUTPATIENT)
Dept: NURSING | Facility: CLINIC | Age: 37
End: 2022-04-28
Payer: COMMERCIAL

## 2022-04-28 NOTE — PROGRESS NOTES
Clinic Care Coordination Contact  Community Health Worker Follow Up    Care Gaps:   Health Maintenance Due   Topic Date Due     Pneumococcal Vaccine: Pediatrics (0 to 5 Years) and At-Risk Patients (6 to 64 Years) (1 - PCV) Never done     PHQ-2 (once per calendar year)  2022     Patient is aware to discuss care gaps details with her PCP at next office visit. Pt is not interested to schedule appt at this time.    Goals:    Goals Addressed as of 2022 at 2:09 PM                    4/28/22    3/2/22       Financial Wellbeing (pt-stated)   20%  20%    Added 22 by Rey Malik LICSW      Goal Statement: I want to connect to SNAP and/or other financial supports through the [facility] within one month.    Date Goal set: 22  Barriers: Language  Strengths:   Date to Achieve By: 3/30/2022  Patient expressed understanding of goal: Yes    Action steps to achieve this goal:  1. I will continue to work with FRW.    (Updated: 2022)             Problem Solving (pt-stated)   10%      Added 22 by Rey Malik LICSW      Goal Statement: I want to correct my son, Annalisa,  with the [facility] within one month.    Date Goal set: 22  Barriers: Language  Strengths:   Date to Achieve By: 3/30/2022  Patient expressed understanding of goal: Yes    Action steps to achieve this goal:  1. I will continue to work with FRW.    (Updated: 2022)             Problem Solving (pt-stated)   50%  50%    Added 22 by Rey Malik LICSW      Goal Statement: I want to have a Social Security Card for my son, Fabián within 1-2 months.    Date Goal set: 22  Barriers: Language  Strengths:   Date to Achieve By: 2022  Patient expressed understanding of goal: Yes  Action steps to achieve this goal:  1. I will wait to hear from the [facility] office for the next step.   2. I will go to the [facility] office.    Note/comment:   -CCC mailed patient medical record and birth certificate to [facility] office on  "2022. Please see CCC SW notes encounter dated 2022 for details if need.     (Updated: 2022)              Discussion:   [facility] CHW was able to connect with patient today follow up current goals. Updated above goals. Pt is aware CCC FRW tried to connect with her on 2022 regarding to \"Program: Adding /correcting child gender/County Benefits.\" CHW encouraged pt to check her voice message and returning FRW called. Pt is aware of FRW plan \"FRW will make outreach in 1-2 weeks\" per FRW notes encounter dated 2022.      Pt request CHW to send a message to FRW to connect with her sooner than 1-2 weeks. Pt request is granted. Pt is aware to wait to hear from FRW, follow FRW instructions and work with the county as recommendation. Pt confirmed that she is doing good, her baby is doing good as well and no other questions or concerns at this time. Pt agreed with CHW plan below.     CHW Next Follow-up: goals follow up     Outreach frequency: monthly     CHW Plan:  -CHW next outreach plan: 4-6 weeks.   -Route message to [facility] FRW to connect with patient per pt request on 2022.    "

## 2022-05-13 ENCOUNTER — PATIENT OUTREACH (OUTPATIENT)
Dept: CARE COORDINATION | Facility: CLINIC | Age: 37
End: 2022-05-13
Payer: COMMERCIAL

## 2022-05-13 NOTE — TELEPHONE ENCOUNTER
Clinic Care Coordination Contact  Program: Adding [place]/correcting child gender/County Benefits   County: [facility] Case #:  [facility] Worker: [phone] (Nena and Selene)  Mnsure #:   Subscriber #:   Renewal:  Date Applied:     FRW Outreach:    22:  FRW called pt and left VM. FRW will make outreach in 1-2 weeks.  22: FRW called pt and left VM. FRW will make outreach in 1-2 weeks.   3/23/22: FRW called pt. Pt received the application and sending to [facility] this week. FRW and pt will connect in 3-4 weeks to see what [facility] has sent back to pt.   3/10/22: FRW called pt to make sure she has combined application. Patient does not. FRW confirmed her address. FRW will connect with pt the week of the  as FRW will be gone next week.   3/4/22: FRW called pt and re-sent application.    22: FRW called pt. Pt hasn't received the application yet. FRW will check back with pt on Friday.   22: FRW called pt and sent out the [facility] combined application for pt. FRW will call pt next week to make sure she received application.   22: FRW called pt to help correct  for  and set up appointment for [facility] benefits.Fabián Barrow Birthday is 2019. Appointment for [facility] benefit application set for . FRW left VM for [facility] workers with correct  for Fabián.  22: FRW called patient to get status of insurance. FRW left VM and will make another outreach in 1-2 weeks.   22: FRW called patient to check on status of  insurance and correction of gender of other child. Patient explained she sent in forms but hasn't heard back from [facility]. FRW and patient will connect in 2 weeks.   12/15/21: FRW called patient. Patient has forms complete and dropping off at [facility]. FRW will follow up with patient in 2-3 weeks.   21: FRW received a VM that Nena from the [facility] called patient and sent form for patient to complete about correcting gender and adding . FRW called  patient, patient received the form from the [facility] yesterday. Patient is having children complete form and send back to [facility] for her. FRW will call patient next week for a status update.   21: FRW called patient to discuss referral. FRW and patient called [facility] to add  to case and correct gender for additional child. FRW received number for case workers and left  to call FRW or patient directly. FRW will follow up in 1 week.     Health Insurance:      Referral/Screening:  Financial Resource Worker Screening     [facility] Benefits  Is patient requesting help applying for [facility] benefits?: No  Have you recently applied for any [facility] benefits?: No  How many people in your household?: 5  Do you buy/eat food together?: Yes     [place]:  [place] No  Is this an insurance renewal?: No  Is this a new insurance application request?: Yes  Have you recently applied for insurance?: No  How many people in your household? : 5  Do you file taxes?: Yes  How many dependents do you claim?: 4     Any other information for the FRW?: Patient needs help adding , Fabián Barrow to her case. SW attempted to support with this by informing , but haven't heard anything back. Also, patient needs to verify other child's coverage Andrew Barrow because gender may be incorrect. Thank you!!                     Goals Addressed:

## 2022-05-23 ENCOUNTER — APPOINTMENT (OUTPATIENT)
Dept: INTERPRETER SERVICES | Facility: CLINIC | Age: 37
End: 2022-05-23
Payer: COMMERCIAL

## 2022-05-23 ENCOUNTER — PATIENT OUTREACH (OUTPATIENT)
Dept: CARE COORDINATION | Facility: CLINIC | Age: 37
End: 2022-05-23
Payer: COMMERCIAL

## 2022-05-23 NOTE — TELEPHONE ENCOUNTER
Clinic Care Coordination Contact  Program: Adding [place]/correcting child gender/County Benefits   County: [facility] Case #:  [facility] Worker: [phone] (Keyshawn)  Mnsure #:   Subscriber #:   Renewal:  Date Applied:     FRW Outreach:    22: FRW called pt.  has MA HP and older child's gender has been corrected. Pt has decided not to pursue [facility] benefits. Routing to CC team.  22:  FRW called pt and left VM. FRW will make outreach in 1-2 weeks.  22: FRW called pt and left VM. FRW will make outreach in 1-2 weeks.   3/23/22: FRW called pt. Pt received the application and sending to [facility] this week. FRW and pt will connect in 3-4 weeks to see what [facility] has sent back to pt.   3/10/22: FRW called pt to make sure she has combined application. Patient does not. FRW confirmed her address. FRW will connect with pt the week of the  as FRW will be gone next week.   3/4/22: FRW called pt and re-sent application.    22: FRW called pt. Pt hasn't received the application yet. FRW will check back with pt on Friday.   22: FRW called pt and sent out the [facility] combined application for pt. FRW will call pt next week to make sure she received application.   22: FRW called pt to help correct  for  and set up appointment for [facility] benefits.Fabián Barrow Birthday is 2019. Appointment for [facility] benefit application set for . FRW left VM for [facility] workers with correct  for Fabián.  22: FRW called patient to get status of insurance. FRW left VM and will make another outreach in 1-2 weeks.   22: FRW called patient to check on status of  insurance and correction of gender of other child. Patient explained she sent in forms but hasn't heard back from [facility]. FRW and patient will connect in 2 weeks.   12/15/21: FRW called patient. Patient has forms complete and dropping off at [facility]. FRW will follow up with patient in 2-3 weeks.    21: FRW received a VM that Nena from the [facility] called patient and sent form for patient to complete about correcting gender and adding . FRW called patient, patient received the form from the [facility] yesterday. Patient is having children complete form and send back to [facility] for her. FRW will call patient next week for a status update.   21: FRW called patient to discuss referral. FRW and patient called [facility] to add  to case and correct gender for additional child. FRW received number for case workers and left  to call FRW or patient directly. FRW will follow up in 1 week.     Health Insurance:      Referral/Screening:  Financial Resource Worker Screening     [facility] Benefits  Is patient requesting help applying for [facility] benefits?: No  Have you recently applied for any [facility] benefits?: No  How many people in your household?: 5  Do you buy/eat food together?: Yes     Insurance:  [place] for [place] insurance?: No  Is this an insurance renewal?: No  Is this a new insurance application request?: Yes  Have you recently applied for insurance?: No  How many people in your household? : 5  Do you file taxes?: Yes  How many dependents do you claim?: 4     Any other information for the FRW?: Patient needs help adding , Fabián Barrow to her case. SW attempted to support with this by informing , but haven't heard anything back. Also, patient needs to verify other child's coverage Andrew Barrow because gender may be incorrect. Thank you!!                     Goals Addressed:

## 2022-05-29 ENCOUNTER — HEALTH MAINTENANCE LETTER (OUTPATIENT)
Age: 37
End: 2022-05-29

## 2022-05-31 ENCOUNTER — PATIENT OUTREACH (OUTPATIENT)
Dept: CARE COORDINATION | Facility: CLINIC | Age: 37
End: 2022-05-31
Payer: COMMERCIAL

## 2022-05-31 NOTE — PROGRESS NOTES
Care Coordination Lead Chart Review     Situation: Patient chart reviewed by care coordinator.?     Background: Initial assessment and enrollment to Care Coordination was 2/26/2021.?? Patient centered goals were developed with participation from patient.?    Assessment: Per chart review, patient outreach completed by CC CHW on 4/28/2022.?Patient parents were able to get the names updated with the county and decided on 5/23/2022 when speaking with FRW, they no longer want to [facility]. I have updated goals today.  Patient is actively working to accomplish goal(s).? Patient's goal(s) remain(s) appropriate at this time.? Patient 2/26/2022 due for updated Plan of Care.? Annual assessment will be due  2/26/2022.      Goals        Patient Stated       Problem Solving (pt-stated)       Goal Statement: I want to have a Social Security Card for my son, Fabián within 1-2 months.    Date Goal set: 02/08/22  Barriers: Language  Strengths:   Date to Achieve By: 4/30/2022  Patient expressed understanding of goal: Yes  Action steps to achieve this goal:  1. I will wait to hear from the [facility] office for the next step.   2. [facility].    Note/comment:   -CCC mailed patient medical record and birth certificate to [facility] office on 2/14/2022. Please see [facility] SW notes encounter dated 2/14/2022 for details if need.     (Updated: 4-)            ??     Plan/Recommendations:   The patient will continue working with Care Coordination to achieve above goal(s).?   CHW will involve Lead CC as needed or if patient is ready to move to graduation  CHW to follow up on Social Security card to see if they have an update from [facility] office or received their card at next outreach   Lead CC will continue to monitor CHW s monthly outreaches and progress to goal(s) every  month

## 2022-05-31 NOTE — PROGRESS NOTES
"Clinic Care Coordination Contact:    Patient is due for CCC CHW follow up.   Per pt chart reviewed; CCC Lead CHW Xochilt completed chart review for patient per encounter dated 5/31/2022 reviewed. \"Plan of Care.? Annual assessment will be due  2/26/2022\" per Xochilt's notes reviewed.     Defer CHW outreach to 1-2 weeks.     CHW Plan: connect with patient 1-2 weeks regarding to current goal(s) follow up and offer annual assessment appt.   "

## 2022-06-10 ENCOUNTER — PATIENT OUTREACH (OUTPATIENT)
Dept: NURSING | Facility: CLINIC | Age: 37
End: 2022-06-10
Payer: COMMERCIAL

## 2022-06-10 NOTE — PROGRESS NOTES
"Clinic Care Coordination Contact  Community Health Worker Follow Up    Care Gaps:   Health Maintenance Due   Topic Date Due     Pneumococcal Vaccine: Pediatrics (0 to 5 Years) and At-Risk Patients (6 to 64 Years) (1 - PCV) Never done     PHQ-2 (once per calendar year)  01/01/2022     COVID-19 Vaccine (3 - Booster for Pfizer series) 05/30/2022     Not able to address due to patient's baby is crying. Defer to next outreach.     Discussion:   [facility] CHW was able to connect with patient today. Not able to follow up the patient current goal or discuss care gaps at this time with patient due to the patient baby is crying. CHW explained annual assessment (reassessment with CCC). Patient agreed. CHW was able to support with scheduling pt for a reassessment appt on 7- at 10:00AM with CCC RN via phone visit. Pt shared that she and the baby are doing well and no other questions or concerns at this time.  CHW suggested patient to connect with CCC team for any additional resources need and attend the reassessment appt with CCC RN. Pt confirmed.     Per pt chart reviewed;   [facility] Lead CHW Xochilt completed chart review for patient per encounter dated 5/31/2022 reviewed. \"Plan of Care.? Annual assessment will be due  2/26/2022\" per Xochilt's notes reviewed.     CHW Next Follow-up: goal follow up     Outreach frequency: monthly     CHW Plan:   -Pt connect PCP office with any questions regarding to follow up appt.   -Patient attend reassessment appt on 7- at 10:00AM with CCC RN via phone visit.  -Next CHW outreach plan: 7-.    "

## 2022-07-05 ENCOUNTER — PATIENT OUTREACH (OUTPATIENT)
Dept: NURSING | Facility: CLINIC | Age: 37
End: 2022-07-05

## 2022-07-05 NOTE — PROGRESS NOTES
Clinic Care Coordination Contact    Follow Up Progress Note      Assessment:  RN CC reached out and spoke with patient   Patient notes that she has worked with FRW and CC team to support goals    Reviewed needs. Mom would like to work with CCC to support children needs.  RN CC will enroll child and follow up with mom if additional support needed for her directly        Plan: Patient will schedule and attend recommended follow up visits with speciality providers and primary care provider.  CCC will be available in the future if need arise

## 2022-07-13 ENCOUNTER — PATIENT OUTREACH (OUTPATIENT)
Dept: CARE COORDINATION | Facility: CLINIC | Age: 37
End: 2022-07-13

## 2022-07-13 NOTE — PROGRESS NOTES
Clinic Care Coordination Contact    Situation: Patient chart reviewed by care coordinator.    Background: CC SW completed chart review    Assessment: Pt working with CC RN and CHW on goals. Current goal appropriate for pt and CCC actively supporting pt on progress    Plan/Recommendations: CCC to maintain monthly standard outreach    MOSES Snider  [facility] Care Coordination

## 2022-07-13 NOTE — LETTER
[facility]  Patient Centered Plan of Care  About Me:        Patient Name:  Shaila Savage    YOB: 1985  Age:         36 year old   Wiliam MRN:    [id number] Telephone Information:  Home Phone [phone]   Mobile [phone]       Address:  [address] [place] [address]  [place] [address] Email address:  [email or web address]      Emergency Contact(s)    Name Relationship Lgl Grd Work Phone Home Phone Mobile Phone   BJ HARLEY Spouse    [phone]           Primary language:  Hmong     needed? Yes   [employer]:  [phone] op. 1  Other communication barriers:No data recorded  Preferred Method of Communication:     Current living arrangement: No data recorded  Mobility Status/ Medical Equipment: No data recorded      Health Maintenance  Health Maintenance Reviewed:             Never   Done Pneumococcal Vaccine: Pediatrics (0 to 5 Years) and At-Risk Patients (6 to 64 Years) (1 - PCV)       JAN 1 2022 PHQ-2 (once per calendar year) (Yearly, January to December)  Last completed: Dec 8, 2021     MAY 30   2022 COVID-19 Vaccine (3 - Booster for Pfizer series)  Last completed: Dec 30, 2021         My Access Plan  Medical Emergency 911   Primary Clinic Line   - [phone]   24 Hour Appointment Line [phone] or  [phone]) (toll-free)   24 Hour Nurse Line [phone] (toll-free)   Preferred Urgent Care No data recorded   Preferred Hospital No data recorded   Preferred Pharmacy [id number] [address] [place] [address] AT [facility] & [place]     Behavioral Health Crisis Line The National Suicide Prevention Lifeline at 1-947.958.1682 or 911             My Care Team Members  Patient Care Team       Relationship Specialty Notifications Start End    Selene Rae MD PCP - General Family Medicine  6/30/21     Phone: [phone] Fax: [phone]         [address] [place] [address]    Sandra Fields MA Community Health Worker  Primary Care - CC Admissions 2/26/21     Selene Rae MD Assigned PCP   6/16/21     Phone: [phone] Fax: [phone]         [address] [facility] [address]    Agatha Campos LSW Lead Care Coordinator Primary Care - CC Admissions 6/20/22             My Care Plans  Self Management and Treatment Plan  Goals and (Comments)   Goals        General     Problem Solving (pt-stated)      Notes - Note edited  4/29/2022  2:08 PM by Sandra Fields MA     Goal Statement: I want to have a Social Security Card for my son, Fabián within 1-2 months.    Date Goal set: 02/08/22  Barriers: Language  Strengths:   Date to Achieve By: 4/30/2022  Patient expressed understanding of goal: Yes  Action steps to achieve this goal:  1. I will wait to hear from the [facility] office for the next step.   2. I will go to the [facility] office.    Note/comment:   -CCC mailed patient medical record and birth certificate to [facility] office on 2/14/2022. Please see [facility] SW notes encounter dated 2/14/2022 for details if need.     (Updated: 4-)                 Action Plans on File:                       Advance Care Plans/Directives Type:   No data recorded    My Medical and Care Information  Problem List   Patient Active Problem List   Diagnosis     Acne vulgaris     Female stress incontinence     Supervision of high risk pregnancy in third trimester     Class 1 obesity due to excess calories without serious comorbidity with body mass index (BMI) of 32.0 to 32.9 in adult     Microcytosis     Alpha thalassemia minor trait     Diet controlled gestational diabetes mellitus (GDM) in third trimester     Normal delivery      Current Medications and Allergies:  See printed Medication Report.    Care Coordination Start Date: No linked episodes   Frequency of Care Coordination: monthly     Form Last Updated: 07/13/2022

## 2022-07-25 ENCOUNTER — PATIENT OUTREACH (OUTPATIENT)
Dept: CARE COORDINATION | Facility: CLINIC | Age: 37
End: 2022-07-25

## 2022-07-25 NOTE — PROGRESS NOTES
Clinic Care Coordination Contact:  Community Health Worker Follow Up    Care Gaps:   Health Maintenance Due   Topic Date Due     Pneumococcal Vaccine: Pediatrics (0 to 5 Years) and At-Risk Patients (6 to 64 Years) (1 - PCV) Never done     PHQ-2 (once per calendar year)  01/01/2022     COVID-19 Vaccine (3 - Booster for Pfizer series) 05/30/2022     Suggested patient to discuss care gaps details with PCP/PCP provider team at next office visit.     Goal accomplished today:   Goal Statement: I want to have a Social Security Card for my son, Fabián within 1-2 months.  Date Goal set: 02/08/22  Date to Achieve By: 4/30/2022  Note/comment:   -CCC mailed patient medical record and birth certificate to [facility] office on 2/14/2022. Please see [facility] SW notes encounter dated 2/14/2022 for details if need.   Date goal completed: 7-    Patient Outreach Discussion:   [facility] CHW was able to connect with patient today follow up current goal. Pt met and completed current goal. Patient confirmed no new goal or urgent need and other concerns at this time. CHW explained [facility] maintenance. Pt is aware next outreach follow up in 2 months if appropriated and message send to [facility] lead care coordinator requesting chart review. Pt agreed. CHW suggested patient to connect the [facility] Office or Social Security Card office in the future with any questions regarding to social security card concerns, continue follow up with PCP as recommendation and CHW/[facility] office with any additional resources need or goals. Pt confirmed.     Pt shared:   -Received my james Lockwood social security card by mail 1-2 months ago. Met goal.   -James Lockwood and I are both doing well. No other questions or concerns at this time.     CHW Next Follow-up: Verify new goal and urgent need. Offer scheduling follow up appt with PCP to discuss care gaps details if not yet completed.      Outreach frequency: monthly     CHW Plan:   -Next CHW outreach plan: 2 months  -Move patient to  CCC maintenance: message route to [facility] care coordinator SW request chart patient chart review.

## 2022-08-31 ENCOUNTER — PATIENT OUTREACH (OUTPATIENT)
Dept: CARE COORDINATION | Facility: CLINIC | Age: 37
End: 2022-08-31

## 2022-08-31 NOTE — PROGRESS NOTES
Clinic Care Coordination - Chart Review Only    Situation/Background: Patient chart reviewed by care coordinator related to [facility] conversion.    Assessment: Patient continues to be followed by Clinic Care Coordination.    Plan: Patient's chart updated to align with [facility] program for ongoing patient management.    Jody Elizabeth RN Care Coordinator  [facility] [place]  Email: [email or web address]  Phone: [phone]

## 2022-09-21 ENCOUNTER — PATIENT OUTREACH (OUTPATIENT)
Dept: CARE COORDINATION | Facility: CLINIC | Age: 37
End: 2022-09-21

## 2022-09-21 NOTE — PROGRESS NOTES
Care Coordination Clinician Chart Review     Situation: Patient chart reviewed by care coordinator.?     Background: Initial assessment and enrollment to Care Coordination was 7/13/2022.?? Care plan(s) with patient-centered goal(s) were developed with participation from patient.? Lead CC handed patient off to CHW for continued outreach every 30 days.??     Assessment: Per chart review, patient outreach completed by CC CHW on 7/25/2022.? Patient is actively working to accomplish goal(s).? Patient's goal(s) remain(s) appropriate at this time.? Patient is not due for updated Plan of Care.? Annual assessment will be due 7/13/2023.         Plan/Recommendations: The patient will continue working with Care Coordination to achieve above goal(s).? CHW will involve Lead CC as needed or if patient is ready to move to maintenance.? Lead CC will continue to monitor CHW s monthly outreaches and progress to goal(s) every 6 weeks.    Plan of Care updated and sent to patient: MOSES Rosas  [facility]

## 2022-09-23 ENCOUNTER — ALLIED HEALTH/NURSE VISIT (OUTPATIENT)
Dept: FAMILY MEDICINE | Facility: CLINIC | Age: 37
End: 2022-09-23
Payer: COMMERCIAL

## 2022-09-23 DIAGNOSIS — Z23 ENCOUNTER FOR IMMUNIZATION: Primary | ICD-10-CM

## 2022-09-23 PROCEDURE — 0124A COVID-19,PF,PFIZER BOOSTER BIVALENT: CPT

## 2022-09-23 PROCEDURE — 99207 PR NO CHARGE NURSE ONLY: CPT

## 2022-09-23 PROCEDURE — 91312 COVID-19,PF,PFIZER BOOSTER BIVALENT: CPT

## 2022-09-26 ENCOUNTER — PATIENT OUTREACH (OUTPATIENT)
Dept: CARE COORDINATION | Facility: CLINIC | Age: 37
End: 2022-09-26

## 2022-09-26 NOTE — PROGRESS NOTES
Clinic Care Coordination Contact  Community Health Worker Follow Up    Today's date: 9-    Care Gaps:   Health Maintenance Due   Topic Date Due     Pneumococcal Vaccine: Pediatrics (0 to 5 Years) and At-Risk Patients (6 to 64 Years) (1 - PCV) Never done     PHQ-2 (once per calendar year)  01/01/2022     INFLUENZA VACCINE (1) 09/01/2022     Patient agreed to flu shot/vaccine appt. CHW send message to [facility] Scheduling/Patient registration pool to connect patient to schedule appt per pt request.    Care Plan:   Care Plan: General     Problem: HP GENERAL PROBLEM     Goal: General Goal - I would like to get my flu shot/vaccine by the next 1 month.     Start Date: 9/26/2022    This Visit's Progress: 10%    Note:     Measure of Success:   Barriers: language  Strengths: Work with [facility] Scheduling patient registration team regarding to schedule appt.   Patient expressed understanding of goal: yes    Action steps to achieve this goal:  1. I will wait to hear from [facility] Patient Scheduling/Registration team to connect with me to schedule an appt with [facility] nurse.   2. I will update status with [facility] team at next outreach follow up.     Note/comment:   -9/26/2022: Message route to [facility] Scheduling/pt registration pool to connect with patient per pt request. (CHW Sandra).                         Discussion:   [facility] CHW was able to connect with patient today. Patient have no new goal and completed all goals per CHW notes reviewed encounter dated 7/25/2022.     CHW verified new goal and most or urgent need. Suggested pt to schedule an appt with PCP to discuss care gaps. Pt agreed to nurse only appt to get a flu shot appt for herself and family members. CHW explained flu shot appt scheduling and message route to [facility] Scheduling pool to connect with pt for further assistance. Patient agreed. Pt set a new goal.     Pt stated:  -Request flu shot appt for me and rest of my family.   -Everyone in my family is doing  well.   -I'm doing good. No other questions or concerns at this time.     CHW Next Follow-up: goal follow up     Outreach frequency: remain monthly at this time. At next CHW outreach plan- Plan to discuss flu shot goal completion and moving patient towards CCC graduation.     CHW Plan:   -Next CHW outreach plan: 10-. Goal follow up; discuss goal completion. Review pt enrollment status.  -CCC maintenance: route message to [facility] SW/lead care coordinator request chart review regarding moving pt toward CCC maintenance per CHW encounter 7/25/2022.     -Flu shot appt: CHW route message to [facility] Scheduling pt registration pool to connect with patient for further assistance per pt request.

## 2022-10-02 ENCOUNTER — HEALTH MAINTENANCE LETTER (OUTPATIENT)
Age: 37
End: 2022-10-02

## 2022-11-11 ENCOUNTER — PATIENT OUTREACH (OUTPATIENT)
Dept: CARE COORDINATION | Facility: CLINIC | Age: 37
End: 2022-11-11

## 2022-11-11 NOTE — LETTER
[facility]  Patient Centered Plan of Care  About Me:        Patient Name:  Shaila Savage    YOB: 1985  Age:         37 year old   Wiliam MRN:    [id number] Telephone Information:  Home Phone [phone]   Mobile [phone]       Address:  [address] [place] [address]  [place] [address] Email address:  [email or web address]      Emergency Contact(s)    Name Relationship Lgl Grd Work Phone Home Phone Mobile Phone   BJ HARLEY Spouse    [phone]           Primary language:  Hmong     needed? Yes   [employer]:  [phone] op. 1  Other communication barriers:No data recorded  Preferred Method of Communication:     Current living arrangement: No data recorded  Mobility Status/ Medical Equipment: No data recorded      Health Maintenance  Health Maintenance Reviewed: Due/overdue  Overdue          Never   Done Pneumococcal Vaccine: Pediatrics (0 to 5 Years) and At-Risk Patients (6 to 64 Years) (1 - PCV)     OCT 10   2017 HEPATITIS B IMMUNIZATION (2 of 3 - 3-dose series)  Last completed: Sep 12, 2017     POOL 1   2022 PHQ-2 (once per calendar year) (Yearly, January to December)  Last completed: Dec 8, 2021     SEP 1   2022 INFLUENZA VACCINE (1)  Last completed: Dec 17, 2021        Due Soon          DEC 8   2022 YEARLY PREVENTIVE VISIT (Yearly)  Last completed: Dec 8, 2021         My Access Plan  Medical Emergency 911   Primary Clinic Line   - [phone]   24 Hour Appointment Line [phone] or  [phone]) (toll-free)   24 Hour Nurse Line [phone] (toll-free)   Preferred Urgent Care No data recorded   Preferred Hospital No data recorded   Preferred Pharmacy [id number] - [place], [place] - [address] AT [facility] & [place]     Behavioral Health Crisis Line The National Suicide Prevention Lifeline at 1-484.469.3035 or Text/Call 618             My Care Team Members  Patient Care Team       Relationship Specialty Notifications  Start End    Selene Rae MD PCP - General Family Medicine  6/30/21     Phone: [phone] Fax: [phone]         [address] [facility] [address]    Sandra Fields MA Community Health Worker Primary Care - CC Admissions 2/26/21     Selene Rae MD Assigned PCP   6/16/21     Phone: [phone] Fax: [phone]         [address] [facility] [address]    Agatha Campos LSW Lead Care Coordinator Primary Care - CC Admissions 6/20/22             My Care Plans  Self Management and Treatment Plan  Care Plan  Care Plan: General     Problem: HP GENERAL PROBLEM     Goal: General Goal - I would like to get my flu shot/vaccine by the next 1 month.     Start Date: 9/26/2022    This Visit's Progress: 10%    Note:     Measure of Success:   Barriers: language  Strengths: Work with [facility] Scheduling patient registration team regarding to schedule appt.   Patient expressed understanding of goal: yes    Action steps to achieve this goal:  1. I will wait to hear from [facility] Patient Scheduling/Registration team to connect with me to schedule an appt with [facility] nurse.   2. I will update status with CCC team at next outreach follow up.     Note/comment:   -9/26/2022: Message route to [facility] Scheduling/pt registration pool to connect with patient per pt request. (CHW Sandra).                            Action Plans on File:                       Advance Care Plans/Directives Type:   No data recorded    My Medical and Care Information  Problem List   Patient Active Problem List   Diagnosis     Acne vulgaris     Female stress incontinence     Supervision of high risk pregnancy in third trimester     Class 1 obesity due to excess calories without serious comorbidity with body mass index (BMI) of 32.0 to 32.9 in adult     Microcytosis     Alpha thalassemia minor trait     Diet controlled gestational diabetes mellitus (GDM) in third trimester     Normal delivery      Current Medications and Allergies:  See printed  Medication Report.    Care Coordination Start Date: 7/13/2022   Frequency of Care Coordination: monthly     Form Last Updated: 11/11/2022

## 2022-11-11 NOTE — PROGRESS NOTES
Care Coordination Clinician Chart Review     Situation: Patient chart reviewed by care coordinator.?     Background: Initial assessment and enrollment to Care Coordination was 7/13/2022 .?? Care plan(s) with patient-centered goal(s) were developed with participation from patient.? Lead CC handed patient off to CHW for continued outreach every 30 days.??     Assessment: Per chart review, patient outreach completed by CC CHW on 9/26/2022.? Patient is actively working to accomplish goal(s).? Patient's goal(s) remain(s) appropriate at this time.? Patient is due for updated Plan of Care.? Annual assessment will be due 7/13/2023.     Care Plan: General     Problem: HP GENERAL PROBLEM     Goal: General Goal - I would like to get my flu shot/vaccine by the next 1 month.     Start Date: 9/26/2022    This Visit's Progress: 10%    Note:     Measure of Success:   Barriers: language  Strengths: Work with [facility] Scheduling patient registration team regarding to schedule appt.   Patient expressed understanding of goal: yes    Action steps to achieve this goal:  1. I will wait to hear from [facility] Patient Scheduling/Registration team to connect with me to schedule an appt with [facility] nurse.   2. I will update status with CCC team at next outreach follow up.     Note/comment:   -9/26/2022: Message route to [facility] Scheduling/pt registration pool to connect with patient per pt request. (CHW Sandra).                           Plan/Recommendations: The patient will continue working with Care Coordination to achieve above goal(s).? CHW will involve Lead CC as needed or if patient is ready to move to maintenance.? Lead CC will continue to monitor CHW s monthly outreaches and progress to goal(s) every 6 weeks.    Plan of Care updated and sent to patient: Yes,     MOSES Snider [facility]

## 2022-11-16 ENCOUNTER — PATIENT OUTREACH (OUTPATIENT)
Dept: CARE COORDINATION | Facility: CLINIC | Age: 37
End: 2022-11-16

## 2022-11-16 NOTE — PROGRESS NOTES
Clinic Care Coordination Contact:  Community Health Worker Follow Up    Today's date: 11/16/2022    Reason: [facility] CHW Follow Up Called    Care Gaps:   Health Maintenance Due   Topic Date Due     Pneumococcal Vaccine: Pediatrics (0 to 5 Years) and At-Risk Patients (6 to 64 Years) (1 - PCV) Never done     HEPATITIS B IMMUNIZATION (2 of 3 - 3-dose series) 10/10/2017     PHQ-2 (once per calendar year)  01/01/2022     INFLUENZA VACCINE (1) 09/01/2022     YEARLY PREVENTIVE VISIT  12/08/2022     Patient is aware of due care gaps. Pt agreed to discuss with [facility] scheduling at next office visit to schedule follow up appt with PCP to discuss details per pt.    Care Plan:   Care Plan: General     Problem: HP GENERAL PROBLEM     Goal: General Goal - I would like to get my flu shot/vaccine by the next 1 month.     Start Date: 9/26/2022    This Visit's Progress: 50% Recent Progress: 10%    Note:     Measure of Success:   Barriers: language  Strengths: Work with [facility] Scheduling patient registration team regarding to schedule appt.   Patient expressed understanding of goal: yes    Action steps to achieve this goal:  1. I will wait to hear from [facility] Patient Scheduling/Registration team to connect with me to schedule an appt with [facility] nurse. (Hearing nothing per pt on 11/16/2022)  2. My  Tiffany and I will attend our flu shot appt on 11/18/2022 at 10:10AM and 10:20AM with [facility].   3. I will follow instructions and will connect [facility] with any questions.       Note/comment:   -9/26/2022: Message route to C Scheduling/pt registration pool to connect with patient per pt request. (CHW Sandra).                         Patient Outreach Discussion:   CHW was able to connect with patient today with pt's spouse Tiffany present in the called. Follow up current goal. Pt stated that she hasn't hear from the [facility] scheduling team to assist with flu shot appt. CHW offer to assist this. Pt agreed. Encouraged pt to contact  PCP/RSC office and [facility] office for any additional resources need. Pt shared that she is doing well and no other questions or concerns.     Coordinate care:   CHW and patient and with pt's spouse in the called conference call to [facility] main phone number [phone] and spoke with Lisa at the scheduling line. CHW explain reason of called and introduced self. CHW provides pt MRN# and pt's spouse provide his  info with pt assisted.      Patient shared per  verifying pt medical insurance status:  -MEMBR#: [id number] (NOTE/FYI: PMI per )  -BIN#: [id number]  -Group #: [id number]  -Request flu shot appt for me and spouse.     Patient flu shot appt scheduled 2022 at 10:10AM and spouse Tiffany flu shot appt scheduled 22 10:20AM with [facility]. Pt confirmed no other questions. Thank .    CHW Next Follow-up: Goal follow up. Discuss goal completion. Offer follow up appt with PCP to discuss care gaps.      Outreach frequency: monthly     CHW Plan: 1 month.

## 2022-11-18 ENCOUNTER — IMMUNIZATION (OUTPATIENT)
Dept: FAMILY MEDICINE | Facility: CLINIC | Age: 37
End: 2022-11-18
Payer: COMMERCIAL

## 2022-11-18 PROCEDURE — 90471 IMMUNIZATION ADMIN: CPT

## 2022-11-18 PROCEDURE — 90682 RIV4 VACC RECOMBINANT DNA IM: CPT

## 2022-12-21 ENCOUNTER — PATIENT OUTREACH (OUTPATIENT)
Dept: CARE COORDINATION | Facility: CLINIC | Age: 37
End: 2022-12-21

## 2022-12-28 ENCOUNTER — PATIENT OUTREACH (OUTPATIENT)
Dept: CARE COORDINATION | Facility: CLINIC | Age: 37
End: 2022-12-28

## 2022-12-28 NOTE — PROGRESS NOTES
"Clinic Care Coordination Contact:    Message received from Dr. Rae via staff message communication through this pt chart.   Per message;  \"Mom brings in bill today for [facility]     Her baby is Fabián Barrow but the bill is for Fabián Savage   $90 infant home care visit on 9/27/21     Can you please help mom get this corrected and submitted to insurance for coverage.\"    CHW Plan:   Connect with patient on 1-       "

## 2023-01-03 ENCOUNTER — PATIENT OUTREACH (OUTPATIENT)
Dept: CARE COORDINATION | Facility: CLINIC | Age: 38
End: 2023-01-03

## 2023-01-03 NOTE — PROGRESS NOTES
Clinic Care Coordination Contact  Program: [facility]  County:  [facility] Case #:  [facility] Worker:   Nery #:   Subscriber #:   Renewal:  Date Applied:     FRW Outreach:   1/3/2023: Outreach attempted x 1. Left message on voicemail with call back information and requested return call.  Plan: FRW will call again within one week.   1/3/2023: FRW emailing Care Team what the FRW referral is for.     Health Insurance:      Referral/Screening:

## 2023-01-04 ENCOUNTER — PATIENT OUTREACH (OUTPATIENT)
Dept: CARE COORDINATION | Facility: CLINIC | Age: 38
End: 2023-01-04

## 2023-01-04 NOTE — PROGRESS NOTES
Clinic Care Coordination Contact  Community Health Worker Follow Up    Reason(s):   -CCC CHW Follow Up Called  -Message received from Dr. Rae encounter dated 12/28/2022   -Verify FRW referral per FRW notes encounter dated 1-    Care Gaps:   Health Maintenance Due   Topic Date Due     Pneumococcal Vaccine: Pediatrics (0 to 5 Years) and At-Risk Patients (6 to 64 Years) (1 - PCV) Never done     HEPATITIS B IMMUNIZATION (2 of 3 - 19+ 3-dose series) 10/10/2017     PHQ-2 (once per calendar year)  01/01/2023     YEARLY PREVENTIVE VISIT  12/08/2022     Patient is aware of due care gaps. Pt prefer to discuss this with PCP at next office visit per pt.    Care Plan:   Care Plan: General     Problem: HP GENERAL PROBLEM     Goal: General Goal - I would like to get my flu shot/vaccine by the next 1 month.     Start Date: 9/26/2022    This Visit's Progress: 50% Recent Progress: 10%    Note:     Measure of Success:   Barriers: language  Strengths: Work with [facility] Scheduling patient registration team regarding to schedule appt.   Patient expressed understanding of goal: yes    Action steps to achieve this goal:  1. I will wait to hear from [facility] Patient Scheduling/Registration team to connect with me to schedule an appt with [facility] nurse. (Hearing nothing per pt on 11/16/2022)  2. My  Tiffany and I will attend our flu shot appt on 11/18/2022 at 10:10AM and 10:20AM with [facility].   3. I will follow instructions and will connect [facility] with any questions.       Note/comment:   -9/26/2022: Message route to [facility] Scheduling/pt registration pool to connect with patient per pt request. (CHW Sandra).                     Problem: HP GENERAL PROBLEM     Goal: General Goal - I would like to review SNAP/food stamp eligiblity with [facility] FRW and need help submitted the application to the [facility] by the next 1-2 months.     Start Date: 1/4/2023    This Visit's Progress: 10%    Note:     Measure of Success:   Barriers:  "language   Strengths: Work with [facility] FRW/[facility] and the [facility] for the next step.   Patient expressed understanding of goal: yes    Action steps to achieve this goal:  1. I will wait to hear from [facility] FRW for the next step.   2. I will check voicemail that left for me.   3. I will review food stamp eligibility with FRW.   4. I will start the SNAP application and submit to the [facility] if eligible.     Note/comment:   -CHW relayed FRW notes encounter dated 1/03/2023 with patient per pt chart reviewed. (Dated: 1-)                        Per CCC CHW notes encounter dated 12- reviewed;   Message received from Dr. Rae via staff message communication through this pt chart.   Per message;  \"Mom brings in bill today for [facility]     Her baby is Fabián Barrow but the bill is for Fabián Savage   $90 infant home care visit on 9/27/21     Can you please help mom get this corrected and submitted to insurance for coverage.\"    Patient chart reviewed;   -FRW Claire attempted to connect patient on 1- to discuss \"Program: SNAP\" and left a voice message for pt; \"Plan: FRW will call again within one week. 1/3/2023: FRW emailing Care Team what the FRW referral is for\" per FRW notes reviewed.     Patient outreach discussion:   CHW was able to connect with pateint today. CHW relayed [facility] FRW Claire notes encounter dated 1- with pt. CHW confirmed FRW referral is for with pt. Pt confirmed food stamp and set a new goal.    CHW verified message received from Dr. Rae above with patient.   Pt shared; gave Dr. Rae the original medical bill for my baby Fabián. Bill indicate Fabián last name as Hussein. Fabián Which should be Pushpa. Will connect [facility] again if receive the same medical bill this month.     Pt stated that her  Tiffany Barrow received a medical bill for flu shot. CHW explained and educated pt how to assist her  to connect with the billing office. Pt's spouse isn't with pt at this time. Pt is aware her "  is over age of 18, not enroll in [facility] service and suggested pt's spouse to connect the billing office due to patient privacy and may connect CCC for any further suggestion if need.      Pt confirmed that she is doing well. No other questions or concerns.     CHW Next Follow-up: goals follow up     Outreach frequency: monthly     CHW Plan:   -CHW next outreach plan: 1 month.   -Message routed to update CCC FRW Claire in regards to FRW referral (FRW encounter dated 1-).

## 2023-01-04 NOTE — Clinical Note
ELINA Gaffney spoke with pt today.  Verified FRW referral per your notes encounter dated 1-.  Pt confirmed need help with food stamp.  Pt is aware of your plan to connect with her again.  Thank you.    ELINA Flores

## 2023-01-06 ENCOUNTER — PATIENT OUTREACH (OUTPATIENT)
Dept: CARE COORDINATION | Facility: CLINIC | Age: 38
End: 2023-01-06

## 2023-01-06 NOTE — LETTER
[facility]  Patient Centered Plan of Care  About Me:        Patient Name:  Shaila Savage    YOB: 1985  Age:         37 year old   Wiliam MRN:    [id number] Telephone Information:  Home Phone [phone]   Mobile [phone]       Address:  [address] [place] [address]  [place] [address] Email address:  [email or web address]      Emergency Contact(s)    Name Relationship Lgl Grd Work Phone Home Phone Mobile Phone   BJ HARLEY Spouse    [phone]           Primary language:  Hmong     needed? Yes   [employer]:  [phone] op. 1  Other communication barriers:No data recorded  Preferred Method of Communication:     Current living arrangement: No data recorded  Mobility Status/ Medical Equipment: No data recorded    Health Maintenance  Health Maintenance Reviewed: Due/Overdue   Health Maintenance Due   Topic Date Due     Pneumococcal Vaccine: Pediatrics (0 to 5 Years) and At-Risk Patients (6 to 64 Years) (1 - PCV) Never done     HEPATITIS B IMMUNIZATION (2 of 3 - 19+ 3-dose series) 10/10/2017     YEARLY PREVENTIVE VISIT  12/08/2022     PHQ-2 (once per calendar year)  01/01/2023     My Access Plan  Medical Emergency 911   Primary Clinic Line   - [phone]   24 Hour Appointment Line [phone] or  [phone] ([phone]) (toll-free)   24 Hour Nurse Line [phone] (toll-free)   Preferred Urgent Care No data recorded   Preferred Hospital No data recorded   Preferred Pharmacy [id number] [address] AT [facility] [address]     Behavioral Health Crisis Line The National Suicide Prevention Lifeline at 1-275.781.6466 or Text/Call 608     My Care Team Members  Patient Care Team       Relationship Specialty Notifications Start End    Selene Rae MD PCP - General Family Medicine  6/30/21     Phone: [phone] Fax: [phone]         [address] [place] [address]    Sandra Fields, MA Community Health Worker Primary Care  - CC Admissions 2/26/21     Selene Rae MD Assigned PCP   6/16/21     Phone: [phone] Fax: [phone]         [address] [facility] [address]    Selene Santos LSW Lead Care Coordinator Primary Care - CC Admissions 1/6/23     Phone: [phone] [address] [facility] [address]            My Care Plans  Self Management and Treatment Plan  Care Plan  Care Plan: General     Problem: HP GENERAL PROBLEM     Goal: General Goal - I would like to get my flu shot/vaccine by the next 1 month.     Start Date: 9/26/2022    This Visit's Progress: 50% Recent Progress: 10%    Note:     Measure of Success:   Barriers: language  Strengths: Work with [facility] Scheduling patient registration team regarding to schedule appt.   Patient expressed understanding of goal: yes    Action steps to achieve this goal:  1. I will wait to hear from [facility] Patient Scheduling/Registration team to connect with me to schedule an appt with [facility] nurse. (Hearing nothing per pt on 11/16/2022)  2. My  Tiffany and I will attend our flu shot appt on 11/18/2022 at 10:10AM and 10:20AM with [facility].   3. I will follow instructions and will connect [facility] with any questions.       Note/comment:   -9/26/2022: Message route to [facility] Scheduling/pt registration pool to connect with patient per pt request. (CHSABI Flores).                     Problem: HP GENERAL PROBLEM     Goal: General Goal - I would like to review SNAP/food stamp eligiblity with [facility] FRW and need help submitted the application to the [facility] by the next 1-2 months.     Start Date: 1/4/2023    This Visit's Progress: 10%    Note:     Measure of Success:   Barriers: language   Strengths: Work with [facility] FRW/[facility] and the [facility] for the next step.   Patient expressed understanding of goal: yes    Action steps to achieve this goal:  1. I will wait to hear from [facility] FRW for the next step.   2. I will check voicemail that left for me.   3. I will review food stamp eligibility  with FRW.   4. I will start the SNAP application and submit to the [facility] if eligible.     Note/comment:   -CHW relayed FRW notes encounter dated 1/03/2023 with patient per pt chart reviewed. (Dated: 1-)                           Action Plans on File:                       Advance Care Plans/Directives Type:   No data recorded    Honoring Choices    Advance Care Planning and Health Care Directives  When it comes to decisions about your health care, it s important that your voice is heard. You may not always be able to speak for yourself.    We encourage you to have discussions with your loved ones, cultural or spiritual leaders and health care providers about your goals, values, beliefs and choices.    We are a part of [employer] [place] , supporting and promoting the benefits of advance care planning conversations.    Our goals are to:    Help you make informed decisions about your healthcare choices and ensure that those choices are honored    Offer advance care planning discussions with trained staff    Ensure your choices are clearly defined, documented and available in your medical record    Translate your choices into medical orders as needed    Why is Advance Care Planning important?    Know what your health care choices are and decide what is right for you    An unexpected illness or injury could leave you unable to participate in important treatment decisions    When choices are left to others to decide that responsibility can be difficult and stressful    By discussing and outlining your choices, your voice is heard in the care you want to receive    How can I learn more?  We offer free classes at multiple locations, days and times. Our trained facilitators will provide information and guide you through a Health Care Directive document. They can also review, notarize and add your document to your medical record.    Call [employer] at [phone] or toll free at [phone]  for assistance.      My Medical and Care Information  Problem List   Patient Active Problem List   Diagnosis     Acne vulgaris     Female stress incontinence     Supervision of high risk pregnancy in third trimester     Class 1 obesity due to excess calories without serious comorbidity with body mass index (BMI) of 32.0 to 32.9 in adult     Microcytosis     Alpha thalassemia minor trait     Diet controlled gestational diabetes mellitus (GDM) in third trimester     Normal delivery      Current Medications and Allergies:   Current Outpatient Medications   Medication Instructions     alcohol swab prep pads Use to swab area of injection/leonardo as directed.     blood glucose (NO BRAND SPECIFIED) test strip Use to test blood sugar 4 times daily or as directed. To accompany: Blood Glucose Monitor Brands: per insurance.     blood glucose calibration (NO BRAND SPECIFIED) solution To accompany: Blood Glucose Monitor Brands: per insurance.     blood glucose monitoring (SOFTCLIX) lancets USE WITH LANCETING DEVICE     Blood Glucose Monitoring Suppl (ACCU-CHEK GUIDE) w/Device KIT USE TO TEST BLOOD SUGAR FOUR TIMES DAILY OR AS DIRECTED.     docusate sodium (COLACE) 100 mg, Oral, DAILY     ferrous gluconate (FERGON) 324 mg, Oral, DAILY WITH BREAKFAST     ibuprofen (ADVIL/MOTRIN) 800 mg, Oral, EVERY 6 HOURS PRN     Prenatal Vit-Fe Fumarate-FA (PRENATAL MULTIVITAMIN W/IRON) 27-0.8 MG tablet 1 tablet, Oral, DAILY     Care Coordination Start Date: 7/13/2022   Frequency of Care Coordination: monthly     Form Last Updated: 02/10/2023

## 2023-01-06 NOTE — PROGRESS NOTES
Clinic Care Coordination Contact    Care Coordination Clinician Chart Review    Situation: Patient chart reviewed by care coordinator.       Background: Care Coordination initial assessment and enrollment to Care Coordination was 12/14/18. Patient centered goals were developed with participation from patient. SW/RN CC handed patient off to CHW for continued outreach every 30 days.        Assessment: Per chart review, patient outreach completed by CC CHW on 1/4/23. Patient is actively working to accomplish goals. Patient's goals remain appropriate and relevant at this time.       Patient is not due for updated Plan of Care.      Annual assessment will be due 7/13/23 per past CC notes.        Care Plan: General     Problem: HP GENERAL PROBLEM     Goal: General Goal - I would like to get my flu shot/vaccine by the next 1 month.     Start Date: 9/26/2022    This Visit's Progress: 50% Recent Progress: 10%    Note:     Measure of Success:   Barriers: language  Strengths: Work with [facility] Scheduling patient registration team regarding to schedule appt.   Patient expressed understanding of goal: yes    Action steps to achieve this goal:  1. I will wait to hear from [facility] Patient Scheduling/Registration team to connect with me to schedule an appt with [facility] nurse. (Hearing nothing per pt on 11/16/2022)  2. My  Tiffany and I will attend our flu shot appt on 11/18/2022 at 10:10AM and 10:20AM with [facility].   3. I will follow instructions and will connect [facility] with any questions.       Note/comment:   -9/26/2022: Message route to [facility] Scheduling/pt registration pool to connect with patient per pt request. (W Sandra).                     Problem: HP GENERAL PROBLEM     Goal: General Goal - I would like to review SNAP/food stamp eligiblity with [facility] FRW and need help submitted the application to the [facility] by the next 1-2 months.     Start Date: 1/4/2023    This Visit's Progress: 10%    Note:     Measure of  Success:   Barriers: language   Strengths: Work with [facility] FRW/CCC and the [facility] for the next step.   Patient expressed understanding of goal: yes    Action steps to achieve this goal:  1. I will wait to hear from [facility] FRW for the next step.   2. I will check voicemail that left for me.   3. I will review food stamp eligibility with FRW.   4. I will start the SNAP application and submit to the [facility] if eligible.     Note/comment:   -CHW relayed FRW notes encounter dated 1/03/2023 with patient per pt chart reviewed. (Dated: 1-)                              Plan/Recommendations: The patient will continue working with Care Coordination to achieve goals as above.      CHW will involve SW CC as needed or if patient is ready to move to maintenance. CHW to outreach every 30 days.     SW CC will continue to monitor progress to goals and CHW outreaches every 6 weeks. New lead SW CC added to care team.     Plan of Care updated and mailed to patient: No    MOSES Mast   Social Work Primary Care Clinic Care Coordinator   [facility]

## 2023-01-09 ENCOUNTER — PATIENT OUTREACH (OUTPATIENT)
Dept: CARE COORDINATION | Facility: CLINIC | Age: 38
End: 2023-01-09

## 2023-01-09 NOTE — PROGRESS NOTES
Clinic Care Coordination Contact  Program: [facility]  County:  [facility] Case #:  [facility] Worker:   Nery #:   Subscriber #:   Renewal:  Date Applied:     FRW Outreach:   1/9/2023: Outreach attempted x 2. Left message on voicemail indicating last outreach attempt.   Plan: FRW closed the FRW program and remove patient from panel. Patient can be referred back to FRW if needed.      1/3/2023: Outreach attempted x 1. Left message on voicemail with call back information and requested return call.  Plan: FRW will call again within one week.   1/3/2023: FRW emailing Care Team what the FRW referral is for.     Health Insurance:      Referral/Screening:

## 2023-02-10 NOTE — PROGRESS NOTES
90 day letter sent.     Selene Santos [facility]   Social Work Primary Care Clinic Care Coordinator   [facility]  [phone]  [email or web address]

## 2023-02-11 ENCOUNTER — HEALTH MAINTENANCE LETTER (OUTPATIENT)
Age: 38
End: 2023-02-11

## 2023-02-15 ENCOUNTER — PATIENT OUTREACH (OUTPATIENT)
Dept: CARE COORDINATION | Facility: CLINIC | Age: 38
End: 2023-02-15
Payer: COMMERCIAL

## 2023-02-15 NOTE — PROGRESS NOTES
Clinic Care Coordination Contact  Community Health Worker Follow Up    Today's date: 2/15/2023    Reason: [facility] CHW Follow Up Called    Care Gaps:   Health Maintenance Due   Topic Date Due     Pneumococcal Vaccine: Pediatrics (0 to 5 Years) and At-Risk Patients (6 to 64 Years) (1 - PCV) Never done     HEPATITIS B IMMUNIZATION (2 of 3 - 19+ 3-dose series) 10/10/2017     YEARLY PREVENTIVE VISIT  12/08/2022     PHQ-2 (once per calendar year)  01/01/2023     Unable to informed patient of due care gaps at this time due to pt time. Defer to next [facility] outreach follow up.    Care Plan:   Care Plan: General     Problem: HP GENERAL PROBLEM     Goal: General Goal - I would like to get my flu shot/vaccine by the next 1 month.     Start Date: 9/26/2022    This Visit's Progress: 50% Recent Progress: 10%    Note:     Measure of Success:   Barriers: language  Strengths: Work with [facility] Scheduling patient registration team regarding to schedule appt.   Patient expressed understanding of goal: yes    Action steps to achieve this goal:  1. I will wait to hear from [facility] Patient Scheduling/Registration team to connect with me to schedule an appt with [facility] nurse. (Hearing nothing per pt on 11/16/2022)  2. My  Tiffany and I will attend our flu shot appt on 11/18/2022 at 10:10AM and 10:20AM with [facility].   3. I will follow instructions and will connect [facility] with any questions.       Note/comment:   -9/26/2022: Message route to [facility] Scheduling/pt registration pool to connect with patient per pt request. (CHW Sandra).                     Problem: HP GENERAL PROBLEM     Goal: General Goal - I would like to review SNAP/food stamp eligiblity with [facility] FRW and need help submitted the application to the [facility] by the next 1-2 months.     Start Date: 1/4/2023    This Visit's Progress: 20% Recent Progress: 10%    Note:     General Goal- I would [facility] and need help submitted the  "application to the county by the next 1-2 months.  Measure of Success:   Barriers: language   Strengths: Work with [facility] FRW/[facility] and the [facility] for the next step.   Patient expressed understanding of goal: yes  Action steps to achieve this goal:  1. I will wait to hear from [facility] FRW for the next step. Still need help with apply SNAP. (Updated: 2/15/2023)  2. I will try to check voicemail that left for me. (Updated: 2/15/2023)  3. I will review food stamp eligibility with FRW. (Updated: 2/15/2023)  4. I will start the SNAP application and submit to the [facility] if eligible. (Updated: 2/15/2023)    Note/comment:   -CHW relayed FRW notes encounter dated 1/03/2023 with patient per pt chart reviewed. (Dated: 1/04/2023)  -Patient is aware [facility] FRW attempted to connect with her on 1/09/2023 per pt chart reviewed notes (FRW note encounter dated 1/09/2023). (Dated: 2/15/2023).                        Patient chart reviewed;   Per [facility] FRW notes reviewed encounter dated 1-;   Program: SNAP\"  FRW Outreach:   1/9/2023: Outreach attempted x 2. Left message on voicemail indicating last outreach attempt.   Plan: FRW closed the FRW program and remove patient from panel. Patient can be referred back to FRW if needed.\"      Patient Outreach Discussion:   CHW was able to connect with patient today. Patient is aware [facility] FRW attempted to connect with patient regarding to SNAP program. CHW encouraged pt to check her voicemail and return FRW message. Pt shared info below and request [facility] help with apply food stamp program. Updated above goal. CHW completed [facility] FRW screening referral questionnaires with pt. Encouraged pt follow FRW instructions and connect [facility] office with any other questions. Pt confirmed.      Patient shared:   -Still need help with apply food stamp. Do not check voice messages left for me.   -Spouse currently employed full-time, hourly pay rate: $17.00 and work 40 hours a week.  -Doing well. No other questions or concerns. "     Plan:   -Attach [facility] FRW referral/screenining to this notes documentation.

## 2023-02-15 NOTE — PROGRESS NOTES
Clinic Care Coordination Contact    Today's date: 2/15/2023    Patient outreach discussion continues:   CHW completed the FRW referral/screening below with patient. Pt is aware [facility] FRW will connect with patient for further assistance to apply food stamp/SNAP. CHW encouraged pt to check her voice message and returning FRW call in the future. Pt confirmed and thank for today's called.     Financial Resource Worker (FRW) Referral/Screening:  County Benefits:  Is patient requesting help applying for [facility] benefits?: Yes  Have you recently applied for any [facility] benefits?: No  How many people in your household?: 5 (Adult: 2. Children: 3 per pt shared on 2/15/2023)  Do you buy/eat food together?: Yes  What is the monthly gross income for the household (wages, social security, workers comp, and pension)? : 2720 (Patient shared: spouse employed full-time, hourly pay rate: $17.00 and work 40 hours a week.)  Insurance:  Is this an insurance renewal?: No  Is this a new insurance application request?: No  Any other information for the FRW?: Patient request help with apply food stamp/SNAP. Re-refer patient to [facility] FRW to connect with patient. Thanks.  Care Coordination team will tell patient:   Thank you for answering all the questions, based on screening questions, our Financial Resource Worker will reach out to you with additional questions and next steps.    CHW Plan:   -CHW next outreach follow up: 1 month.  -[facility] FRW connect with patient to apply/discuss SNAP/food stamp program.

## 2023-02-20 ENCOUNTER — PATIENT OUTREACH (OUTPATIENT)
Dept: CARE COORDINATION | Facility: CLINIC | Age: 38
End: 2023-02-20
Payer: COMMERCIAL

## 2023-02-21 ENCOUNTER — PATIENT OUTREACH (OUTPATIENT)
Dept: CARE COORDINATION | Facility: CLINIC | Age: 38
End: 2023-02-21
Payer: COMMERCIAL

## 2023-02-21 NOTE — PROGRESS NOTES
Clinic Care Coordination Contact  Program: SNAP  County: [facility] Case #:  [facility] Worker:   Nery #:   Subscriber #:   Renewal:  Date Applied:     FRW Outreach:   2/21/23  FRW called patient and left a vm with call back information. FRW will make outreach next week      Health Insurance:      Referral/Screening:    FRW Screening    Row Name 02/15/23 1439       [facility] Benefits   Is patient requesting help applying for [facility] benefits? Yes       Have you recently applied for any [facility] benefits? No       How many people in your household? 5  Adult: 2. Children: 3 per pt shared on 2/15/2023       Do you buy/eat food together? Yes       What is the monthly gross income for the household (wages, social security, workers comp, and pension)?  2720  Patient shared: spouse employed full-time, hourly pay rate: $17.00 and work 40 hours a week.       Insurance:   Is this an insurance renewal? No       Is this a new insurance application request? No       OTHER   Is this a eileen care application? No       Any other information for the FRW? Patient request help with apply food stamp/SNAP. Re-refer patient to [facility] FRW to connect with patient. Thanks.

## 2023-02-23 ENCOUNTER — PATIENT OUTREACH (OUTPATIENT)
Dept: CARE COORDINATION | Facility: CLINIC | Age: 38
End: 2023-02-23
Payer: COMMERCIAL

## 2023-02-23 NOTE — PROGRESS NOTES
Clinic Care Coordination Contact  Care Coordination Clinician Chart Review    Situation: Patient chart reviewed by Care Coordinator.       Background: Care Coordination Program started: 7/13/2022. Initial assessment completed 12/14/18 and patient-centered care plan(s) were developed with participation from patient. Lead CC handed patient off to CHW for continued outreaches.       Assessment: Per chart review, patient outreach completed by [facility] CHW on 2/15/23. Patient is actively working to accomplish goal(s). Patient's goal(s) appropriate and relevant at this time.     Patient is not due for updated Plan of Care.      Assessments will be completed annually or as needed/with change of patient status. Due 7/13/23.        Care Plan: General     Problem: HP GENERAL PROBLEM     Goal: General Goal - I would like to get my flu shot/vaccine by the next 1 month.     Start Date: 9/26/2022    This Visit's Progress: 50% Recent Progress: 10%    Note:     Measure of Success:   Barriers: language  Strengths: Work with [facility] Scheduling patient registration team regarding to schedule appt.   Patient expressed understanding of goal: yes    Action steps to achieve this goal:  1. I will wait to hear from [facility] Patient Scheduling/Registration team to connect with me to schedule an appt with [facility] nurse. (Hearing nothing per pt on 11/16/2022)  2. My  Tiffany and I will attend our flu shot appt on 11/18/2022 at 10:10AM and 10:20AM with [facility].   3. I will follow instructions and will connect [facility] with any questions.       Note/comment:   -9/26/2022: Message route to [facility] Scheduling/pt registration pool to connect with patient per pt request. (CHW Sandra).                     Problem: HP GENERAL PROBLEM     Goal: General Goal - I would like to review SNAP/food stamp eligiblity with [facility] FRW and need help submitted the application to the [facility] by the next 1-2 months.     Start Date: 1/4/2023    This Visit's Progress:  20% Recent Progress: 10%    Note:     General Goal- I would like to review SNAP/food stamp eligiblity with [facility] FRW and need help submitted the application to the [facility] by the next 1-2 months.  Measure of Success:   Barriers: language   Strengths: Work with [facility] FRW/[facility] and the [facility] for the next step.   Patient expressed understanding of goal: yes  Action steps to achieve this goal:  1. I will wait to hear from [facility] FRW for the next step. Still need help with apply SNAP. (Updated: 2/15/2023)  2. I will try to check voicemail that left for me. (Updated: 2/15/2023)  3. I will review food stamp eligibility with FRW. (Updated: 2/15/2023)  4. I will start the SNAP application and submit to the [facility] if eligible. (Updated: 2/15/2023)    Note/comment:   -CHW relayed FRW notes encounter dated 1/03/2023 with patient per pt chart reviewed. (Dated: 1/04/2023)  -Patient is aware [facility] FRW attempted to connect with her on 1/09/2023 per pt chart reviewed notes (FRW note encounter dated 1/09/2023). (Dated: 2/15/2023).                             Plan/Recommendations: The patient will continue working with Care Coordination to achieve goal(s) as above.     CHW will continue outreaches at minimum every 30 days and will involve Lead CC as needed or if patient is ready to move to Maintenance.     Lead CC will continue to monitor CHW outreaches and patient's progress to goal(s) every 6 weeks.     Plan of Care updated and sent to patient: MOSES Solis   Social Work [facility] Care Coordinator   [facility]

## 2023-02-27 ENCOUNTER — PATIENT OUTREACH (OUTPATIENT)
Dept: CARE COORDINATION | Facility: CLINIC | Age: 38
End: 2023-02-27
Payer: COMMERCIAL

## 2023-02-27 NOTE — PROGRESS NOTES
Clinic Care Coordination Contact  Program: [facility]  County: [facility] Case #:  [facility] Worker:   Nery #:   Subscriber #:   Renewal:  Date Applied:     FRW Outreach:   2/27/2023  FRW called patient attempt x2 and left a vm with call back information. FRW will make outreach next week    2/21/23  FRW called patient and left a vm with call back information. FRW will make outreach next week      Health Insurance:      Referral/Screening:    FRW Screening    Row Name 02/15/23 1432       [facility] Benefits   Is patient requesting help applying for [facility] benefits? Yes       Have you recently applied for any [facility] benefits? No       How many people in your household? 5  Adult: 2. Children: 3 per pt shared on 2/15/2023       Do you buy/eat food together? Yes       What is the monthly gross income for the household (wages, social security, workers comp, and pension)?  2720  Patient shared: spouse employed full-time, hourly pay rate: $17.00 and work 40 hours a week.       Insurance:   Is this an insurance renewal? No       Is this a new insurance application request? No       OTHER   Is this a eileen care application? No       Any other information for the FRW? Patient request help with apply food stamp/SNAP. Re-refer patient to [facility] FRW to connect with patient. Thanks.

## 2023-03-09 ENCOUNTER — PATIENT OUTREACH (OUTPATIENT)
Dept: CARE COORDINATION | Facility: CLINIC | Age: 38
End: 2023-03-09
Payer: COMMERCIAL

## 2023-03-09 NOTE — PROGRESS NOTES
Clinic Care Coordination Contact  Program: [facility]  County: [facility] Case #:  [facility] Worker:   Cristiure #:   Subscriber #:   Renewal:  Date Applied:     FRW Outreach:   3/9/23 FRW called patient and left a final vm with call back information as attempt x3 with no answer or return phone calls. FRW closed the FRW program and remove patient from panel. Patient can be referred back to FRW if needed.      2/27/2023  FRW called patient attempt x2 and left a vm with call back information. FRW will make outreach next week  2/21/23  FRW called patient and left a vm with call back information. FRW will make outreach next week      Health Insurance:      Referral/Screening:    FRW Screening    Row Name 02/15/23 1432       [facility] Benefits   Is patient requesting help applying for [facility] benefits? Yes       Have you recently applied for any [facility] benefits? No       How many people in your household? 5  Adult: 2. Children: 3 per pt shared on 2/15/2023       Do you buy/eat food together? Yes       What is the monthly gross income for the household (wages, social security, workers comp, and pension)?  2720  Patient shared: spouse employed full-time, hourly pay rate: $17.00 and work 40 hours a week.       Insurance:   Is this an insurance renewal? No       Is this a new insurance application request? No       OTHER   Is this a eileen care application? No       Any other information for the FRW? Patient request help with apply food stamp/SNAP. Re-refer patient to [facility] FRW to connect with patient. Thanks.

## 2023-03-10 ENCOUNTER — PATIENT OUTREACH (OUTPATIENT)
Dept: CARE COORDINATION | Facility: CLINIC | Age: 38
End: 2023-03-10
Payer: COMMERCIAL

## 2023-03-10 DIAGNOSIS — Z59.9 FINANCIAL DIFFICULTIES: Primary | ICD-10-CM

## 2023-03-10 SDOH — ECONOMIC STABILITY - INCOME SECURITY: PROBLEM RELATED TO HOUSING AND ECONOMIC CIRCUMSTANCES, UNSPECIFIED: Z59.9

## 2023-03-10 NOTE — PROGRESS NOTES
Clinic Care Coordination Contact  Community Health Worker Follow Up    Today's date: 3/10/2023    Care Gaps:   Health Maintenance Due   Topic Date Due     YEARLY PREVENTIVE VISIT  Never done     Pneumococcal Vaccine: Pediatrics (0 to 5 Years) and At-Risk Patients (6 to 64 Years) (1 - PCV) Never done     HEPATITIS B IMMUNIZATION (2 of 3 - 19+ 3-dose series) 10/10/2017     PHQ-2 (once per calendar year)  01/01/2023     Not able to discuss today due to patient outreach discussion reason. Defer to next month.     Care Plan:   Care Plan: General     Problem: HP GENERAL PROBLEM     Goal: General Goal - I would like to get my flu shot/vaccine by the next 1 month.     Start Date: 9/26/2022    This Visit's Progress: 50% Recent Progress: 10%    Note:     Measure of Success:   Barriers: language  Strengths: Work with [facility] Scheduling patient registration team regarding to schedule appt.   Patient expressed understanding of goal: yes    Action steps to achieve this goal:  1. I will wait to hear from [facility] Patient Scheduling/Registration team to connect with me to schedule an appt with [facility] nurse. (Hearing nothing per pt on 11/16/2022)  2. My  Tiffany and I will attend our flu shot appt on 11/18/2022 at 10:10AM and 10:20AM with [facility].   3. I will follow instructions and will connect [facility] with any questions.       Note/comment:   -9/26/2022: Message route to [facility] Scheduling/pt registration pool to connect with patient per pt request. (ELINA Flores).                     Problem: HP GENERAL PROBLEM     Goal: General Goal - I would like to [facility] and need help submitted the application to the [facility] by the next 1-2 months.     Start Date: 1/4/2023    This Visit's Progress: 20% Recent Progress: 10%    Note:     General Goal- I would like to review SNAP[facility]food [facility] and need help submitted the application to the [facility] by the next 1-2  months.  Measure of Success:   Barriers: language   Strengths: Work with [facility] FRW/[facility] and the [facility] for the next step.   Patient expressed understanding of goal: yes  Action steps to achieve this goal:  1. I will wait to hear from [facility] FRW for the next step. Still need help with apply SNAP. (Updated: 2/15/2023)  2. I will try to check voicemail that left for me. (Updated: 2/15/2023)  3. I will review food stamp eligibility with FRW. (Updated: 2/15/2023)  4. I will start the SNAP application and submit to the [facility] if eligible. (Updated: 2/15/2023)    Note/comment:   -CHW relayed FRW notes encounter dated 1/03/2023 with patient per pt chart reviewed. (Dated: 1/04/2023)  -Patient is aware [facility] FRW attempted to connect with her on 1/09/2023 per pt chart reviewed notes (FRW note encounter dated 1/09/2023). (Dated: 2/15/2023).                    Problem: HP GENERAL PROBLEM     Goal: General Goal -   I need help with connected to my health care insurance company to request copy of my family medical insurance cards send to me via mail by the next 1 month.     Start Date: 3/10/2023    This Visit's Progress: 10%    Note:     Goal: I need help with connected to my health care insurance company to request copy of my family medical insurance cards send to me via mail by the next 1 month.     Measure of Success: n/a  Barriers: Language  Strengths: Working with my CHW and [facility] team.  Patient expressed understanding of goal: yes    Action steps to achieve this goal:  1. I will wait to hear from my CHW next week, 3/13/2023.                           Patient Outreach Discussion:    Patient's child/son Fabián refer to [facility] service today. CHW was able to connect with patient to discuss enrollment into [facility] service for pt's son. Note documentation through Fabián chart.    Pt stated that she forgot where she placed her family medical insurance card, can't remember whether or not received the second time request. Pt request CHW to help with  this. Pt is aware CHW unable to assist pt request today due to CHW schedule and will connect with patient on Monday, 3/13/2023. Patient agreed, add new goal above, confirmed that she is home all day Monday, 3/13/2023, available at anytime and no other questions at this time.      CHW Plan: connect with patient on 3/13/2023 per patient request.

## 2023-03-13 ENCOUNTER — PATIENT OUTREACH (OUTPATIENT)
Dept: CARE COORDINATION | Facility: CLINIC | Age: 38
End: 2023-03-13
Payer: COMMERCIAL

## 2023-03-13 NOTE — PROGRESS NOTES
Clinic Care Coordination Contact:  Community Health Worker Follow Up    Today's date: 3/13/2023    Reason: [facility] CHW Follow Up Called- (connect with [employer] to request pt and family member medical insurance cards info per pt request on 3/10/2023)    Care Gaps:   Health Maintenance Due   Topic Date Due     YEARLY PREVENTIVE VISIT  Never done     Pneumococcal Vaccine: Pediatrics (0 to 5 Years) and At-Risk Patients (6 to 64 Years) (1 - PCV) Never done     HEPATITIS B IMMUNIZATION (2 of 3 - 19+ 3-dose series) 10/10/2017     PHQ-2 (once per calendar year)  01/01/2023     Not able to discuss this today due to time of coordinate care and pt time.     Care Plan:   Care Plan: General     Problem: HP GENERAL PROBLEM     Goal: General Goal - I would like to get my flu shot/vaccine by the next 1 month.     Start Date: 9/26/2022    This Visit's Progress: 50% Recent Progress: 10%    Note:     Measure of Success:   Barriers: language  Strengths: Work with [facility] Scheduling patient registration team regarding to schedule appt.   Patient expressed understanding of goal: yes    Action steps to achieve this goal:  1. I will wait to hear from [facility] Patient Scheduling/Registration team to connect with me to schedule an appt with [facility] nurse. (Hearing nothing per pt on 11/16/2022)  2. My  Tiffany and I will attend our flu shot appt on 11/18/2022 at 10:10AM and 10:20AM with [facility].   3. I will follow instructions and will connect [facility] with any questions.       Note/comment:   -9/26/2022: Message route to C Scheduling/pt registration pool to connect with patient per pt request. (CHW Sandra).                     Problem: HP GENERAL PROBLEM     Goal: General Goal - I would like to review SNAP/food stamp eligiblity with [facility] FRW and need help submitted the application to the [facility] by the next 1-2 months.     Start Date: 1/4/2023    This Visit's Progress: 20% Recent Progress: 10%    Note:     General Goal- I  would like to review SNAP/food stamp eligiblity with [facility] FRW and need help submitted the application to the [facility] by the next 1-2 months.  Measure of Success:   Barriers: language   Strengths: Work with [facility] FRW/[facility] and the [facility] for the next step.   Patient expressed understanding of goal: yes  Action steps to achieve this goal:  1. I will wait to hear from [facility] FRW for the next step. Still need help with apply SNAP. (Updated: 2/15/2023)  2. I will try to check voicemail that left for me. (Updated: 2/15/2023)  3. I will review food stamp eligibility with FRW. (Updated: 2/15/2023)  4. I will start the SNAP application and submit to the [facility] if eligible. (Updated: 2/15/2023)    Note/comment:   -CHW relayed FRW notes encounter dated 1/03/2023 with patient per pt chart reviewed. (Dated: 1/04/2023)  -Patient is aware [facility] FRW attempted to connect with her on 1/09/2023 per pt chart reviewed notes (FRW note encounter dated 1/09/2023). (Dated: 2/15/2023).                    Problem: HP GENERAL PROBLEM     Goal: General Goal -   I need help with connected to my health care insurance company to request copy of my family medical insurance cards send to me via mail by the next 1 month.     Start Date: 3/10/2023    This Visit's Progress: On track Recent Progress: 10%    Note:     Goal: I need help with connected to my health care insurance company to request copy of my family medical insurance cards send to me via mail by the next 1 month.     Measure of Success: n/a  Barriers: Language  Strengths: Working with my CHW and [facility] team.  Patient expressed understanding of goal: yes    Action steps to achieve this goal:  1. I will wait to hear from my CHW next week, 3/13/2023. (Completed; 3/13/2023-CHW MX)  2. I will wait to receive me and my two child (Jolie and Andrew) medical insurance cards from [employer] via mail by the next 7-10 business days. (Updated: 3/13/2023)  3. I will updates status with my CHW at next outreach  "follow up. (Updated: 3/13/2023)    Note/comment:   -3/13/2023: CHW with pt conference called to [employer] at [phone] and requested pt and two child medical insurance cards send to pt via mail per pt request. Patient [employer] ID#: [id number].                      Patient Outreach Discussion:   CHW as able to connect with patient today regards to reason above per pt request on 3/10/2023. [facility]. Verified pt and family member current medical insurance status. Pt confirmed \"[employer]\" for pt and two child Jolie and Andrew.      10:13AM:   CHW and pt conference [facility] member services line at [phone] with pt's spouse present. Pt's spouse assist pt with two child  info. Spoke with representative \"Anselmo\" per CHW verified. CHW explain reason of called and that is in the line. Verified pt and two child address. CHW was able to assist patient request- copy of medical insurance card for pt and two child (Jolie Pushpa and Andrew Pushpa). Pt is aware that pt will received all three medical insurance cards via mail by the next \"7-10 business days\" per Anselmo. Pt confirmed no questions. End called with Anselmo.     CHW updated above goal with pt due to pt time. Pt is aware to connect with [facility] for any additional resources need and PCP office with any other questions. Pt confirmed.     CHW Next Follow-up: goals      Outreach frequency: monthly     CHW Plan:   -Patient wait to received pt and two child medical insurance cards from [employer].  -CHW next outreach follow up plan: 1 month.        "

## 2023-04-13 ENCOUNTER — PATIENT OUTREACH (OUTPATIENT)
Dept: CARE COORDINATION | Facility: CLINIC | Age: 38
End: 2023-04-13
Payer: COMMERCIAL

## 2023-04-13 NOTE — PROGRESS NOTES
Clinic Care Coordination Contact  Community Health Worker Follow Up    Today's date: 4/13/2023    Reason: [facility] CHW Follow Up Called (follow up current goals)    Care Gaps:   Health Maintenance Due   Topic Date Due     YEARLY PREVENTIVE VISIT  Never done     HEPATITIS B IMMUNIZATION (2 of 3 - 19+ 3-dose series) 10/10/2017     PHQ-2 (once per calendar year)  01/01/2023     Unable to informed pt's spouse Tiffany of due care gaps today due to patient isn't with him at this time and Tiffany is currently at work.    Care Plan:   Care Plan: General     Problem: HP GENERAL PROBLEM     Goal: General Goal - I would like to get my flu shot/vaccine by the next 1 month.     Start Date: 9/26/2022    This Visit's Progress: 50% Recent Progress: 10%    Note:     Measure of Success:   Barriers: language  Strengths: Work with [facility] Scheduling patient registration team regarding to schedule appt.   Patient expressed understanding of goal: yes    Action steps to achieve this goal:  1. I will wait to hear from [facility] Patient Scheduling/Registration team to connect with me to schedule an appt with [facility] nurse. (Hearing nothing per pt on 11/16/2022)  2. My  Tiffany and I will attend our flu shot appt on 11/18/2022 at 10:10AM and 10:20AM with [facility].   3. I will follow instructions and will connect [facility] with any questions.       Note/comment:   -9/26/2022: Message route to [facility] Scheduling/pt registration pool to connect with patient per pt request. (CHW Sandra).                     Problem: HP GENERAL PROBLEM     Goal: General Goal - I would like to review SNAP/food stamp eligiblity with [facility] FRW and need help submitted the application to the [facility] by the next 1-2 months.     Start Date: 1/4/2023    This Visit's Progress: 20% Recent Progress: 10%    Note:     General Goal- I would [facility] and need help submitted the application to the [facility] by the next 1-2 months.  Measure of  Success:   Barriers: language   Strengths: Work with [facility] FRW/[facility] and the [facility] for the next step.   Patient expressed understanding of goal: yes  Action steps to achieve this goal:  1. I will wait to hear from [facility] FRW for the next step. Still need help with apply SNAP. (Updated: 2/15/2023)  2. I will try to check voicemail that left for me. (Updated: 2/15/2023)  3. I will review food stamp eligibility with FRW. (Updated: 2/15/2023)  4. I will start the SNAP application and submit to the [facility] if eligible. (Updated: 2/15/2023)    Note/comment:   -CHW relayed FRW notes encounter dated 1/03/2023 with patient per pt chart reviewed. (Dated: 1/04/2023)  -Patient is aware [facility] FRW attempted to connect with her on 1/09/2023 per pt chart reviewed notes (FRW note encounter dated 1/09/2023). (Dated: 2/15/2023).                    Problem: HP GENERAL PROBLEM     Goal: General Goal -   I need help with connected to my health care insurance company to request copy of my family medical insurance cards send to me via mail by the next 1 month.  Completed 4/13/2023    Start Date: 3/10/2023    This Visit's Progress: 100% Recent Progress: On track    Note:     Goal: I need help with connected to my health care insurance company to request copy of my family medical insurance cards send to me via mail by the next 1 month.     Measure of Success: n/a  Barriers: Language  Strengths: Working with my CHW and [facility] team.  Patient expressed understanding of goal: yes    Action steps to achieve this Goal:  1. I will wait to hear from my CHW next week, 3/13/2023. (Completed; 3/13/2023-CHW MX)  2. I will wait to receive me and my two child (Jolie and Andrew) medical insurance cards from [employer] via mail by the next 7-10 business days. (Completed; 4/13/2023)  3. I will updates status with my CHW at next outreach follow up. (Completed; 4/13/2023)    Note/comment:   -3/13/2023: CHW with pt conference called to [employer] at [phone]  [phone] and requested pt and two child medical insurance cards send to pt via mail per pt request. Patient [employer] ID#: [id number].    Personal action steps:   1. I had received all the medical insurance card from [facility]. Met goal. Thank you [facility].  2. My  and I will connect with [employer] in the future with any questions.                         Patient Outreach Discussion:   Attempted #1: CHW attempted to connect with patient and no answer nor voicemail available. CHW was not able to reach patient at this time.     Attempted #2:   [facility] CHW attempted to connect with patient through pt's spouse [employer] phone and spoke with [employer]. Asked to speak with patient. Consent to communication is file. CHW follow-up medical insurance card goal with patient's spouse. Tiffany is informed to have pt connect with CHW/CCC for suggestion (\"suggested\") below. Tiffany confirmed. Was not able to follow up all goals with Tiffany today due to his time.    Pt's spouse Tiffany shared:  -I currently at work and \"my wife\" (fyi: patient) is at home. My wife has received all medical insurance cards. Thank you [facility] service. Wife is doing well and no other questions at this time.     CHW suggested patient and spouse for the following:  -Pt connect CCC/CHW with any additional resources need.  -Connect with pt PCP office for scheduling appt and any other questions.  -Connect [employer] in the future regards to medical insurance card questions.    CHW Next Follow-up: Goals follow up. Discuss care gaps (if any)    Outreach frequency: monthly      CHW Plan:   -Next CHW outreach plan: 1 month    "

## 2023-04-17 ENCOUNTER — PATIENT OUTREACH (OUTPATIENT)
Dept: CARE COORDINATION | Facility: CLINIC | Age: 38
End: 2023-04-17
Payer: COMMERCIAL

## 2023-04-17 NOTE — PROGRESS NOTES
Clinic Care Coordination Contact  Care Coordination Clinician Chart Review    Situation: Patient chart reviewed by Care Coordinator.       Background: Care Coordination Program started: 7/13/2022. Initial assessment completed 12/14/18 and patient-centered care plan(s) were developed with participation from patient. Lead CC handed patient off to CHW for continued outreaches.       Assessment: Per chart review, patient outreach completed by [facility] CHW on 4/13/23. Patient is actively working to accomplish goal(s). Patient's goal(s) appropriate and relevant at this time.     Patient is not due for updated Plan of Care.      Assessments will be completed annually or as needed/with change of patient status. Due 7/13/23.        Care Plan: General     Problem: HP GENERAL PROBLEM     Goal: General Goal - I would like to get my flu shot/vaccine by the next 1 month.     Start Date: 9/26/2022    This Visit's Progress: 50% Recent Progress: 10%    Note:     Measure of Success:   Barriers: language  Strengths: Work with [facility] Scheduling patient registration team regarding to schedule appt.   Patient expressed understanding of goal: yes    Action steps to achieve this goal:  1. I will wait to hear from [facility] Patient Scheduling/Registration team to connect with me to schedule an appt with [facility] nurse. (Hearing nothing per pt on 11/16/2022)  2. My  Tiffany and I will attend our flu shot appt on 11/18/2022 at 10:10AM and 10:20AM with [facility].   3. I will follow instructions and will connect [facility] with any questions.       Note/comment:   -9/26/2022: Message route to [facility] Scheduling/pt registration pool to connect with patient per pt request. (CHW Sandra).                     Problem: HP GENERAL PROBLEM     Goal: General Goal - I would like to review SNAP/food stamp eligiblity with [facility] FRW and need help submitted the [facility] the [facility] by the next 1-2 months.     Start Date: 1/4/2023    This Visit's Progress:  20% Recent Progress: 10%    Note:     General Goal- I would like to review SNAP/food stamp eligiblity with [facility] FRW and need help submitted the application to the [facility] by the next 1-2 months.  Measure of Success:   Barriers: language   Strengths: Work with [facility] FRW/[facility] and the [facility] for the next step.   Patient expressed understanding of goal: yes  Action steps to achieve this goal:  1. I will wait to hear from [facility] FRW for the next step. Still need help with apply SNAP. (Updated: 2/15/2023)  2. I will try to check voicemail that left for me. (Updated: 2/15/2023)  3. I will review food stamp eligibility with FRW. (Updated: 2/15/2023)  4. I will start the SNAP application and submit to the [facility] if eligible. (Updated: 2/15/2023)    Note/comment:   -CHW relayed FRW notes encounter dated 1/03/2023 with patient per pt chart reviewed. (Dated: 1/04/2023)  -Patient is aware [facility] FRW attempted to connect with her on 1/09/2023 per pt chart reviewed notes (FRW note encounter dated 1/09/2023). (Dated: 2/15/2023).                    Problem: HP GENERAL PROBLEM                    Plan/Recommendations: The patient will continue working with Care Coordination to achieve goal(s) as above.     CHW will continue outreaches at minimum every 30 days and will involve Lead CC as needed or if patient is ready to move to Maintenance.     Lead CC will continue to monitor CHW outreaches and patient's progress to goal(s) every 6 weeks.     Plan of Care updated and sent to patient: No    Selene [facility]   Social Work Primary [facility] Care Coordinator   [phone]  [email or web address]

## 2023-04-17 NOTE — LETTER
[facility]  Patient Centered Plan of Care  About Me:        Patient Name:  Shaila Savage    YOB: 1985  Age:         37 year old   Alverda MRN:    [id number] Telephone Information:  Home Phone Not on file.   Mobile [phone]       Address:  [address] [place] [address] Email address:  [email or web address]      Emergency Contact(s)    Name Relationship Lgl Grd Work Phone Home Phone Mobile Phone   BJ HARLEY Spouse    [phone]           Primary language:  Hmong     needed? Yes   Al[facility] Language Services:  [phone] op. 1  Other communication barriers:Language barrier  Preferred Method of Communication:     Current living arrangement: No data recorded  Mobility Status/ Medical Equipment: No data recorded    Health Maintenance  Health Maintenance Reviewed: Due/Overdue   Health Maintenance Due   Topic Date Due    YEARLY PREVENTIVE VISIT  Never done    HEPATITIS B IMMUNIZATION (2 of 3 - 19+ 3-dose series) 10/10/2017    PHQ-2 (once per calendar year)  01/01/2023     My Access Plan  Medical Emergency 911   Primary Clinic Line [facility] [phone]   24 Hour Appointment Line [phone] or  [address] [place] [address] [phone]) (toll-free)   24 Hour Nurse Line [phone] (toll-free)   Preferred Urgent Care No data recorded   Preferred Hospital No data recorded   Preferred Pharmacy [id number] [address] AT [facility] & [address]     Behavioral Health Crisis Line The National Suicide Prevention Lifeline at 1-790.182.6933 or Text/Call 148       My Care Team Members  Patient Care Team         Relationship Specialty Notifications Start End    Selene Rae MD PCP - General Family Medicine  6/30/21     Phone: [phone] Fax: [phone] [address] [facility] [address]    Sandra Fields MA Community Health Worker Primary Care - CC Admissions 2/26/21     Selene Rae MD Assigned PCP   6/16/21      Phone: [phone] Fax: [phone]         [address] [facility] [address]    Selene Carmona LSW Lead Care Coordinator Primary Care -  Admissions 1/6/23     Phone: [phone] [address] [facility] [place] [address]              My Care Plans  Self Management and Treatment Plan  Care Plan  Care Plan: General       Problem: HP GENERAL PROBLEM       Goal: General Goal - I would like to get my flu shot/vaccine by the next 1 month.       Start Date: 9/26/2022    This Visit's Progress: 50% Recent Progress: 10%    Note:     Measure of Success:   Barriers: language  Strengths: Work with [facility] Scheduling patient registration team regarding to schedule appt.   Patient expressed understanding of goal: yes    Action steps to achieve this goal:  1. I will wait to hear from [facility] Patient Scheduling/Registration team to connect with me to schedule an appt with [facility] nurse. (Hearing nothing per pt on 11/16/2022)  2. My  Tiffany and I will attend our flu shot appt on 11/18/2022 at 10:10AM and 10:20AM with [facility].   3. I will follow instructions and will connect [facility] with any questions.       Note/comment:   -9/26/2022: Message route to [facility] Scheduling/pt registration pool to connect with patient per pt request. (ELINA Flores).                           Problem: HP GENERAL PROBLEM       Goal: General Goal - I would like to review SNAP/food stamp eligiblity with [facility] FRW and need help submitted the application to the [facility] by the next 1-2 months.       Start Date: 1/4/2023    This Visit's Progress: 20% Recent Progress: 10%    Note:     General Goal- I would like to review SNAP/food stamp eligiblity with [facility] FRW and need help submitted the application to the [facility] by the next 1-2 months.  Measure of Success:   Barriers: language   Strengths: Work with [facility] FRW/[facility] and the [facility] for the next step.   Patient expressed understanding of goal: yes  Action steps to achieve this goal:  1. I will wait to hear  from [facility] FRW for the next step. Still need help with apply SNAP. (Updated: 2/15/2023)  2. I will try to check voicemail that left for me. (Updated: 2/15/2023)  3. I will review food stamp eligibility with FRW. (Updated: 2/15/2023)  4. I will start the SNAP application and submit to the county if eligible. (Updated: 2/15/2023)    Note/comment:   -CHW relayed FRW notes encounter dated 1/03/2023 with patient per pt chart reviewed. (Dated: 1/04/2023)  -Patient is aware [facility] FRW attempted to connect with her on 1/09/2023 per pt chart reviewed notes (FRW note encounter dated 1/09/2023). (Dated: 2/15/2023).                          Problem: HP GENERAL PROBLEM                      Action Plans on File:                       Advance Care Plans/Directives Type:   No data recorded    Honoring Choices    Advance Care Planning and Health Care Directives  When it comes to decisions about your health care, it s important that your voice is heard. You may not always be able to speak for yourself.    We encourage you to have discussions with your loved ones, cultural or spiritual leaders and health care providers about your goals, values, beliefs and choices.    We are a part of [employer] [place] , supporting and promoting the benefits of advance care planning conversations.    Our goals are to:  Help you make informed decisions about your healthcare choices and ensure that those choices are honored  Offer advance care planning discussions with trained staff  Ensure your choices are clearly defined, documented and available in your medical record  Translate your choices into medical orders as needed    Why is Advance Care Planning important?  Know what your health care choices are and decide what is right for you  An unexpected illness or injury could leave you unable to participate in important treatment decisions  When choices are left to others to decide that responsibility can be difficult and stressful  By discussing  and outlining your choices, your voice is heard in the care you want to receive    How can I learn more?  We offer free classes at multiple locations, days and times. Our trained facilitators will provide information and guide you through a Health Care Directive document. They can also review, notarize and add your document to your medical record.    Call [employer] at [phone] or toll free at [phone] for assistance.      My Medical and Care Information  Problem List   Patient Active Problem List   Diagnosis    Acne vulgaris    Female stress incontinence    Supervision of high risk pregnancy in third trimester    Class 1 obesity due to excess calories without serious comorbidity with body mass index (BMI) of 32.0 to 32.9 in adult    Microcytosis    Alpha thalassemia minor trait    Diet controlled gestational diabetes mellitus (GDM) in third trimester    Normal delivery      Current Medications and Allergies:    Current Outpatient Medications   Medication Instructions    alcohol swab prep pads Use to swab area of injection/leonardo as directed.    blood glucose (NO BRAND SPECIFIED) test strip Use to test blood sugar 4 times daily or as directed. To accompany: Blood Glucose Monitor Brands: per insurance.    blood glucose calibration (NO BRAND SPECIFIED) solution To accompany: Blood Glucose Monitor Brands: per insurance.    blood glucose monitoring (SOFTCLIX) lancets USE WITH LANCETING DEVICE    Blood Glucose Monitoring Suppl (ACCU-CHEK GUIDE) w/Device KIT USE TO TEST BLOOD SUGAR FOUR TIMES DAILY OR AS DIRECTED.    docusate sodium (COLACE) 100 mg, Oral, DAILY    ferrous gluconate (FERGON) 324 mg, Oral, DAILY WITH BREAKFAST    ibuprofen (ADVIL/MOTRIN) 800 mg, Oral, EVERY 6 HOURS PRN    Prenatal Vit-Fe Fumarate-FA (PRENATAL MULTIVITAMIN W/IRON) 27-0.8 MG tablet 1 tablet, Oral, DAILY     Care Coordination Start Date: 7/13/2022   Frequency of Care Coordination: monthly       Form Last Updated:  05/10/2023

## 2023-05-10 NOTE — PROGRESS NOTES
90 day letter sent.     Selene Carmona [facility]   Social Work Primary Care Clinic Care Coordinator   [facility]  [phone]  [email or web address]

## 2023-05-30 ENCOUNTER — PATIENT OUTREACH (OUTPATIENT)
Dept: CARE COORDINATION | Facility: CLINIC | Age: 38
End: 2023-05-30
Payer: COMMERCIAL

## 2023-05-30 NOTE — PROGRESS NOTES
Clinic Care Coordination Contact:    Mail check:   Per CCC mailbox check; CHW received a medical bill statement dropped off in clinic by patient.     Per medical bill statement reviewed;   -From: [facility]; [address] [address]. [place] [address].   -To: Shaila Savage. Address: [address] [place] [address]. [place] [address].  -Statement date: 4/27/2023.   -Statement Description: Invoice [id number]; Date: 9/27/2023. Description: Maternal/Child- infant visit.  -Note: Fabián Savage. For any questions or concerns regarding your bill or to make a payment, please call the [facility]illing Center at [phone].   -Pay this amount: $90.00  -Account No: [id number].    Plan:   Review medical bill with patient.

## 2023-05-30 NOTE — PROGRESS NOTES
Clinic Care Coordination Contact  Community Health Worker Follow Up    Reason:   -CCC CHW Follow Up Called (Follow up current goal(s))  -Review medical bill statement with patient    Care Gaps:   Health Maintenance Due   Topic Date Due     YEARLY PREVENTIVE VISIT  Never done     HEPATITIS B IMMUNIZATION (2 of 3 - 19+ 3-dose series) 10/10/2017     PHQ-2 (once per calendar year)  01/01/2023     Pt is aware of due care gaps. Pt confirmed that she prefer to discuss this with PCP at next office visit. Pt will connect with PCP office to schedule appt per pt.    Care Plan:   Care Plan: General     Problem: HP GENERAL PROBLEM     Goal: General Goal - I would like to get my flu shot/vaccine by the next 1 month.     Start Date: 9/26/2022    This Visit's Progress: 50% Recent Progress: 10%    Note:     Measure of Success:   Barriers: language  Strengths: Work with [facility] Scheduling patient registration team regarding to schedule appt.   Patient expressed understanding of goal: yes    Action steps to achieve this goal:  1. I will wait to hear from [facility] Patient Scheduling/Registration team to connect with me to schedule an appt with [facility] nurse. (Hearing nothing per pt on 11/16/2022)  2. My  Tiffany and I will attend our flu shot appt on 11/18/2022 at 10:10AM and 10:20AM with [facility].   3. I will follow instructions and will connect [facility] with any questions.       Note/comment:   -9/26/2022: Message route to [facility] Scheduling/pt registration pool to connect with patient per pt request. (CHW Sandra).                     Problem: HP GENERAL PROBLEM     Goal: General Goal - I would like to review SNAP/food stamp eligiblity with [facility] FRW and need help submitted the application to the [facility] by the next 1-2 months.     Start Date: 1/4/2023    This Visit's Progress: 20% Recent Progress: 10%    Note:     General Goal- I would like to review SNAP/food stamp eligiblity with [facility] FRW and need help submitted the  application to the [facility] by the next 1-2 months.  Measure of Success:   Barriers: language   Strengths: Work with [facility] FRW/[facility] and the [facility] for the next step.   Patient expressed understanding of goal: yes  Action steps to achieve this goal:  1. I will wait to hear from [facility] FRW for the next step. Still need help with apply SNAP. (Updated: 2/15/2023)  2. I will try to check voicemail that left for me. (Updated: 2/15/2023)  3. I will review food stamp eligibility with FRW. (Updated: 2/15/2023)  4. I will start the SNAP application and submit to the [facility] if eligible. (Updated: 2/15/2023)    Note/comment:   -CHW relayed FRW notes encounter dated 1/03/2023 with patient per pt chart reviewed. (Dated: 1/04/2023)  -Patient is aware [facility] FRW attempted to connect with her on 1/09/2023 per pt chart reviewed notes (FRW note encounter dated 1/09/2023). (Dated: 2/15/2023).                    Problem: HP GENERAL PROBLEM             Care Plan: General     Problem: HP GENERAL PROBLEM     Goal: General Goal - I need help with resolved medical bill statement by the next 3-5 months.     Start Date: 5/30/2023    This Visit's Progress: 10%    Note:     Goal: I need help with resolved medical bill statement by the next 3-5 months.  Barriers: Language barrier  Strengths: Seeking support  Patient expressed understanding of goal: Yes    Action steps to achieve this goal:  1. I will wait to hear from [facility] for the next step regards to billing statement dropped it off to CCC team on week of 5/30/2023. (updated: 5/30/2023)  2. I will connect with [facility] team monthly to assess and address goal. If other concerns arise, my mom will discuss them with [facility] team.  (Updated: 5/30/2023)                      Patient Outreach Discussion:  CHW was able to connect with patient today regards to reason above. Reviewed medical bill above with pt. Pt shared that pt and son Fabián are both doing well, no other questions or concerns at this time.  Pt request CHW help with coordinate care to the billing office. Pt is currently in the middle of something and not available to conference call to the billing office with CHW at this time. Pt added a new goal. Unable to updates current goals today due to pt time; defer to next month outreach follow up.     CHW Plan:   -Coordinate Care with [facility] on 6/01/2023 regards to patient bill statement below (verifying pt bill statement below- see if it is a co-pay, or missing patient medical insurance info. Provides pt medical insurance info if need) per pt request

## 2023-06-01 ENCOUNTER — PATIENT OUTREACH (OUTPATIENT)
Dept: CARE COORDINATION | Facility: CLINIC | Age: 38
End: 2023-06-01
Payer: COMMERCIAL

## 2023-06-01 NOTE — PROGRESS NOTES
Clinic Care Coordination Contact    Today's date: 6/1/2023    Reason: Coordinate Care with [facility] regards to patient bill statement below (verifying pt bill statement below- see if it is a co-pay, or missing patient medical insurance info. Provides pt medical insurance info if need) per pt request     Coordinate Care:   CHW attempted to connect with [facility] Billing office today regards to reason above at the request of pt request and no answer. Left a general voice message on the [facility] Billing office voicemail request billing dept to connect with patient for clarification. Invoice number and pt phone number is provided due to coordinate care reason at the request of patient.    CHW Next Follow-up: Goals follow up     Outreach frequency: monthly     CHW Plan:   -Patient wait to hear from [facility] Billing Office for further clarification regards to invoice below.  -CHW next outreach: 1 month.

## 2023-06-01 NOTE — PROGRESS NOTES
Clinic Care Coordination Contact  Care Coordination Clinician Chart Review    Situation: Patient chart reviewed by Care Coordinator.       Background: Care Coordination Program started: 7/13/2022. Initial assessment completed 12/14/18 and patient-centered care plan(s) were developed with participation from patient. Lead CC handed patient off to CHW for continued outreaches.       Assessment: Per chart review, patient outreach completed by CC CHW on 5/30/23. Patient is actively working to accomplish goal(s). Patient's goal(s) appropriate and relevant at this time.     Patient is not due for updated Plan of Care.      Assessments will be completed annually or as needed/with change of patient status. Due 7/13/23.        Care Plan: General     Problem: HP GENERAL PROBLEM     Goal: General Goal - I would like to get my flu shot/vaccine by the next 1 month.     Start Date: 9/26/2022    This Visit's Progress: 50% Recent Progress: 10%    Note:     Measure of Success:   Barriers: language  Strengths: Work with [facility] Scheduling patient registration team regarding to schedule appt.   Patient expressed understanding of goal: yes    Action steps to achieve this goal:  1. I will wait to hear from [facility] Patient Scheduling/Registration team to connect with me to schedule an appt with [facility] nurse. (Hearing nothing per pt on 11/16/2022)  2. My  Tiffany and I will attend our flu shot appt on 11/18/2022 at 10:10AM and 10:20AM with [facility].   3. I will follow instructions and will connect [facility] with any questions.       Note/comment:   -9/26/2022: Message route to [facility] Scheduling/pt registration pool to connect with patient per pt request. (CHW Sandra).                     Problem: HP GENERAL PROBLEM     Goal: General Goal - I would like to review SNAP/food stamp eligiblity with [facility] FRW and need help submitted the application to the [facility] by the next 1-2 months.     Start Date: 1/4/2023    This Visit's Progress:  20% Recent Progress: 10%    Note:     General Goal- I would like to review SNAP/food stamp eligiblity with [facility] FRW and need help submitted the application to the [facility] by the next 1-2 months.  Measure of Success:   Barriers: language   Strengths: Work with [facility] FRW/[facility] and the [facility] for the next step.   Patient expressed understanding of goal: yes  Action steps to achieve this goal:  1. I will wait to hear from [facility] FRW for the next step. Still need help with apply SNAP. (Updated: 2/15/2023)  2. I will try to check voicemail that left for me. (Updated: 2/15/2023)  3. I will review food stamp eligibility with FRW. (Updated: 2/15/2023)  4. I will start the SNAP application and submit to the [facility] if eligible. (Updated: 2/15/2023)    Note/comment:   -CHW relayed FRW notes encounter dated 1/03/2023 with patient per pt chart reviewed. (Dated: 1/04/2023)  -Patient is aware [facility] FRW attempted to connect with her on 1/09/2023 per pt chart reviewed notes (FRW note encounter dated 1/09/2023). (Dated: 2/15/2023).                    Problem: HP GENERAL PROBLEM             Care Plan: General     Problem: HP GENERAL PROBLEM     Goal: General Goal - I need help with resolved medical bill statement by the next 3-5 months.     Start Date: 5/30/2023    This Visit's Progress: 10%    Note:     Goal: I need help with resolved medical bill statement by the next 3-5 months.  Barriers: Language barrier  Strengths: Seeking support  Patient expressed understanding of goal: Yes    Action steps to achieve this goal:  1. I will wait to hear from [facility] for the next step regards to billing statement dropped it off to [facility] team on week of 5/30/2023. (updated: 5/30/2023)  2. I will connect with [facility] team monthly to assess and address goal. If other concerns arise, my mom will discuss them with [facility] team.  (Updated: 5/30/2023)                           Plan/Recommendations: The patient will continue working with Care Coordination to  achieve goal(s) as above.     CHW will continue outreaches at minimum every 30 days and will involve Lead CC as needed or if patient is ready to move to Maintenance.     Lead CC will continue to monitor CHW outreaches and patient's progress to goal(s) every 6 weeks.     Plan of Care updated and sent to patient: Shima Carmona SABI   Social Work Primary Care Clinic Care Coordinator   [phone]  [email or web address]

## 2023-06-29 ENCOUNTER — PATIENT OUTREACH (OUTPATIENT)
Dept: CARE COORDINATION | Facility: CLINIC | Age: 38
End: 2023-06-29
Payer: COMMERCIAL

## 2023-06-29 NOTE — PROGRESS NOTES
"Clinic Care Coordination Contact:    Reason: Coordinate Care    Patient chart reviewed;   -Per [facility] CHW encounter dated 5/30/2023 notes reviewed;   \"Per medical bill statement reviewed;   -From: [facility]; [address] [address]. [place] [address].   -To: Shaila Savage. Address: [address] [place] [address]. [place] [address].  -Statement date: 4/27/2023.   -Statement Description: Invoice [id number]; Date: 9/27/2023. Description: Maternal/Child- infant visit.  -Note: Fabián Savage. For any questions or concerns regarding your bill or to make a payment, please call the [facility] Billing Center at [phone].   -Pay this amount: $90.00  -Account No: [id number].\"    Coordinate Care:   2:14PM: CHW call [facility] at [phone] (option #3-billing office) and reach voicemail. Left a voice message including pt \"Account No: [id number],\" pt  full name, call back phone number, and request for Hmong speaking . End called.     Plan:   [facility] return call/connect with patient with Hmong  service.   "

## 2023-06-29 NOTE — PROGRESS NOTES
Clinic Care Coordination Contact  Community Health Worker Follow Up    Reason: CCC CHW Follow Up Called (follow up current goal(s))    Care Gaps:   Health Maintenance Due   Topic Date Due     YEARLY PREVENTIVE VISIT  Never done     HEPATITIS B IMMUNIZATION (2 of 3 - 19+ 3-dose series) 10/10/2017     PHQ-2 (once per calendar year)  01/01/2023     Unable to informed pt of due care gaps due to to patient time. Pt is aware to connect with PCP office with any questions.    Care Plan:   Care Plan: General     Problem: HP GENERAL PROBLEM     Goal: General Goal - I would like to get my flu shot/vaccine by the next 1 month.     Start Date: 9/26/2022    This Visit's Progress: 50% Recent Progress: 50%    Note:     Measure of Success:   Barriers: language  Strengths: Work with [facility] Scheduling patient registration team regarding to schedule appt.   Patient expressed understanding of goal: yes    Action steps to achieve this goal:  1. I will wait to hear from [facility] Patient Scheduling/Registration team to connect with me to schedule an appt with [facility] nurse. (Hearing nothing per pt on 11/16/2022)  2. My  Tiffany and I will attend our flu shot appt on 11/18/2022 at 10:10AM and 10:20AM with [facility].   3. I will follow instructions and will connect [facility] with any questions.       Note/comment:   -9/26/2022: Message route to [facility] Scheduling/pt registration pool to connect with patient per pt request. (CHW Sandra).    (Updated: 6/29/2023)- unable to follow up goal do to patient time.                     Problem: HP GENERAL PROBLEM     Goal: General Goal - I would like to review SNAP/food stamp eligiblity with [facility] FRW and need help submitted the application to the [facility] by the next 1-2 months.     Start Date: 1/4/2023    This Visit's Progress: 20% Recent Progress: 20%    Note:     General Goal- I would [facility] and need help submitted the application to the [facility] by  the next 1-2 months.  Measure of Success:   Barriers: language   Strengths: Work with [facility] FRW/[facility] and the [facility] for the next step.   Patient expressed understanding of goal: yes  Action steps to achieve this goal:  1. I will wait to hear from [facility] FRW for the next step. Still need help with apply SNAP. (Updated: 2/15/2023)  2. I will try to check voicemail that left for me. (Updated: 2/15/2023)  3. I will review food stamp eligibility with FRW. (Updated: 2/15/2023)  4. I will start the SNAP application and submit to the [facility] if eligible. (Updated: 2/15/2023)    Note/comment:   -CHW relayed FRW notes encounter dated 1/03/2023 with patient per pt chart reviewed. (Dated: 1/04/2023)  -[facility] 1/09/2023 per pt chart reviewed notes (FRW note encounter dated 1/09/2023). (Dated: 2/15/2023).     (Updated: 6/29/2023)- not able to update goal due to pt time.                     Problem: HP GENERAL PROBLEM             Care Plan: General     Problem: HP GENERAL PROBLEM     Goal: General Goal - I need help with resolved medical bill statement by the next 3-5 months.     Start Date: 5/30/2023    This Visit's Progress: 10%    Note:     Goal: I need help with resolved medical bill statement by the next 3-5 months.  Barriers: Language barrier  Strengths: Seeking support  Patient expressed understanding of goal: Yes    Action steps to achieve this goal:  1. I will wait to hear from [facility] for the next step regards to billing statement dropped it off to [facility] team on week of 5/30/2023. (updated: 5/30/2023)  2. I will connect with [facility] team monthly to assess and address goal. If other concerns arise, my mom will discuss them with [facility] team.  (Updated: 5/30/2023)                      Patient Outreach Discussion:   [facility] CHW was able to connect with patient today. Check in how patient is doing. Follow up CHW outreach to [facility] Billing office on June 1, 2023 (FYI: CHW  encounter dated 5/30/2023). Patient shared info below.    Patient shared:  -Hasn't hear from [facility] Billing office via phone. Received same bill statement for month this month (fyi: June). Request help with this.   -Doing well.   -No other questions or concerns.     CHW suggested patient for the following:  -Pt connect CCC/CHW with any additional resources need and PCP office for scheduling appt.    CHW Next Follow-up: Goal follow up. Informed patient of due care gaps (if any).     Outreach frequency: monthly      CHW Plan:   -CHW follow up CHW voice message left for [facility] Billing office on 6/01/2023 (FYI: CHW encounter 5/30/2023).

## 2023-07-24 NOTE — PROGRESS NOTES
Clinic Care Coordination Contact    Community Health Worker Follow Up    Care Gaps:     Health Maintenance Due   Topic Date Due     Pneumococcal Vaccine: Pediatrics (0 to 5 Years) and At-Risk Patients (6 to 64 Years) (1 - PCV) Never done     HEPATITIS B IMMUNIZATION (2 of 3 - 19+ 3-dose series) 10/10/2017     PHQ-2 (once per calendar year)  01/01/2022     YEARLY PREVENTIVE VISIT  12/08/2022       Care Gaps Last addressed on 12/21/2022.    Care Plan:   Care Plan: General     Problem: HP GENERAL PROBLEM     Goal: General Goal - I would like to get my flu shot/vaccine by the next 1 month.     Start Date: 9/26/2022    This Visit's Progress: 50% Recent Progress: 10%    Note:     Measure of Success:   Barriers: language  Strengths: Work with [facility] Scheduling patient registration team regarding to schedule appt.   Patient expressed understanding of goal: yes    Action steps to achieve this goal:  1. I will wait to hear from [facility] Patient Scheduling/Registration team to connect with me to schedule an appt with [facility] nurse. (Hearing nothing per pt on 11/16/2022)  2. My  Tiffany and I will attend our flu shot appt on 11/18/2022 at 10:10AM and 10:20AM with [facility].   3. I will follow instructions and will connect [facility] with any questions.       Note/comment:   -9/26/2022: Message route to [facility] Scheduling/pt registration pool to connect with patient per pt request. (CHW Sandra).                           ** CHW spoke to patient who stated she completed getting her flu shot. CHW let patient know of remaining care gaps, patient stated she would get those scheduled on her own time. Patient brought up the need for food assistance and stated that  had given her an application in the past, but she did not complete it and would like a new one. CHW will route message to .    Intervention and Education during outreach: CHW encouraged patient to reach out to [facility] if any needs arise.    CHW Plan: next CHW outreach  Discharge Facility:[facility]   Discharge Diagnosis:E87.6 Hypokalemia, I95.9 Hypotension, R18.8Abdominal ascites, R06.02 Shortness of breath at rest  Admission Date:7/16/23  Discharge Date: 7/23/23    PCP Appointment Date:8/2/23  Specialist Appointment Date:   Hospital Encounter and Summary: Linked   See discharge assessment below for further details    Engagement  Call Start Time: 1540 (7/24/2023  3:39 PM)    Medications  Medications reviewed with patient/caregiver?: Yes (7/24/2023  3:39 PM)  Is the patient having any side effects they believe may be caused by any medication additions or changes?: No (7/24/2023  3:39 PM)  Does the patient have all medications ordered at discharge?: Yes (7/24/2023  3:39 PM)  Care Management Interventions: Provided patient education (7/24/2023  3:39 PM)  Is the patient taking all medications as directed (includes completed medication regime)?: Yes (7/24/2023  3:39 PM)  Care Management Interventions: Provided patient education (7/24/2023  3:39 PM)  Medication Comments: Fludrocortisone, Mag Oxide, Potassium Chloride (7/24/2023  3:39 PM)    Appointments  Does the patient have a primary care provider?: Yes (7/24/2023  3:39 PM)  Care Management Interventions: Verified appointment date/time/provider (7/24/2023  3:39 PM)  Has the patient kept scheduled appointments due by today?: Yes (7/24/2023  3:39 PM)  Care Management Interventions: Advised patient to keep appointment; Educated on importance of keeping appointment (7/24/2023  3:39 PM)    Self Management  What is the home health agency?: No home care (7/11/2023 10:47 AM)  Has home health visited the patient within 72 hours of discharge?: Not applicable (7/24/2023  3:39 PM)  What Durable Medical Equipment (DME) was ordered?: None (7/11/2023 10:47 AM)    Patient Teaching  Does the patient have access to their discharge instructions?: Yes (7/24/2023  3:39 PM)  Care Management Interventions: Reviewed instructions with patient (7/24/2023  3:39  PM)  What is the patient's perception of their health status since discharge?: Improving (7/24/2023  3:39 PM)  Is the patient/caregiver able to teach back the hierarchy of who to call/visit for symptoms/problems? PCP, Specialist, Home Health nurse, Urgent Care, ED, 911: Yes (7/24/2023  3:39 PM)    Wrap Up  Wrap Up Additional Comments: Patient is requesting help finding a pain management doctor. (7/24/2023  3:39 PM)  Call End Time: 1045 (7/11/2023 10:47 AM)         in one month on 01/20/2023.    ELINA Atkinson  [facility] Care Coordination  [facility]  [phone]

## 2023-07-25 ENCOUNTER — PATIENT OUTREACH (OUTPATIENT)
Dept: CARE COORDINATION | Facility: CLINIC | Age: 38
End: 2023-07-25
Payer: COMMERCIAL

## 2023-07-25 ASSESSMENT — ACTIVITIES OF DAILY LIVING (ADL): DEPENDENT_IADLS:: INDEPENDENT

## 2023-07-25 NOTE — PROGRESS NOTES
Clinic Care Coordination Contact  Care Coordination Clinician Chart Review    Situation: Patient chart reviewed by Care Coordinator.       Background: Care Coordination Program started: 7/13/2022. Initial assessment completed 12/14/18 and patient-centered care plan(s) were developed with participation from patient. Lead CC handed patient off to CHW for continued outreaches.       Assessment: Per chart review, patient outreach completed by CC CHW on 6/29/23. Patient is actively working to accomplish goal(s). Patient's goal(s) appropriate and relevant at this time.     Patient is not due for updated Plan of Care.      Assessments will be completed annually or as needed/with change of patient status. Due 7/13/24.        Care Plan: General       Problem: HP GENERAL PROBLEM       Goal: General Goal - I would like to get my flu shot/vaccine by the next 1 month.       Start Date: 9/26/2022    This Visit's Progress: 50% Recent Progress: 50%    Note:     Measure of Success:   Barriers: language  Strengths: Work with [facility] Scheduling patient registration team regarding to schedule appt.   Patient expressed understanding of goal: yes    Action steps to achieve this goal:  1. I will wait to hear from [facility] Patient Scheduling/Registration team to connect with me to schedule an appt with [facility] nurse. (Hearing nothing per pt on 11/16/2022)  2. My  Tiffany and I will attend our flu shot appt on 11/18/2022 at 10:10AM and 10:20AM with [facility].   3. I will follow instructions and will connect [facility] with any questions.       Note/comment:   -9/26/2022: Message route to [facility] Scheduling/pt registration pool to connect with patient per pt request. (SABI Flores).    (Updated: 6/29/2023)- unable to follow up goal do to patient time.                           Problem: HP GENERAL PROBLEM       Goal: General Goal - I would like to review SNAP/food stamp eligiblity with [facility] FRW and need help submitted the application to the  [facility] by the next 1-2 months.       Start Date: 1/4/2023    This Visit's Progress: 20% Recent Progress: 20%    Note:     General Goal- I would like to review SNAP/food stamp eligiblity with [facility] FRW and need help submitted the application to the [facility] by the next 1-2 months.  Measure of Success:   Barriers: language   Strengths: Work with [facility] FRW/[facility] and the [facility] for the next step.   Patient expressed understanding of goal: yes  Action steps to achieve this goal:  1. I will wait to hear from [facility] FRW for the next step. Still need help with apply SNAP. (Updated: 2/15/2023)  2. I will try to check voicemail that left for me. (Updated: 2/15/2023)  3. I will review food stamp eligibility with FRW. (Updated: 2/15/2023)  4. I will start the SNAP application and submit to the [facility] if eligible. (Updated: 2/15/2023)    Note/comment:   -CHW relayed FRW notes encounter dated 1/03/2023 with patient per pt chart reviewed. (Dated: 1/04/2023)  -Patient is aware [facility] FRW attempted to connect with her on 1/09/2023 per pt chart reviewed notes (FRW note encounter dated 1/09/2023). (Dated: 2/15/2023).     (Updated: 6/29/2023)- not able to update goal due to pt time.                           Problem: HP GENERAL PROBLEM                   Care Plan: General       Problem: HP GENERAL PROBLEM       Goal: General Goal - I need help with resolved medical bill statement by the next 3-5 months.       Start Date: 5/30/2023    This Visit's Progress: 10%    Note:     Goal: I need help with resolved medical bill statement by the next 3-5 months.  Barriers: Language barrier  Strengths: Seeking support  Patient expressed understanding of goal: Yes    Action steps to achieve this goal:  1. I will wait to hear from [facility] for the next step regards to billing statement dropped it off to [facility] team on week of 5/30/2023. (updated: 5/30/2023)  2. I will connect with [facility] team monthly to assess and address goal. If other concerns arise, my mom  will discuss them with CCC team.  (Updated: 5/30/2023)                                 Plan/Recommendations: The patient will continue working with Care Coordination to achieve goal(s) as above.     CHW will continue outreaches at minimum every 30 days and will involve Lead CC as needed or if patient is ready to move to Maintenance.     Lead CC will continue to monitor CHW outreaches and patient's progress to goal(s) every 6 weeks.     Plan of Care updated and sent to patient: Shima Carmona SABI   Social Work Primary Care Clinic Care Coordinator   [phone]  [email or web address]

## 2023-08-03 NOTE — PROGRESS NOTES
"Clinic Care Coordination Contact:    Communication:   Next pt outreach follow up moved to month of 8/2023 due to CHW time off schedule for month of July 2023.     Patient chart reviewed:   \"Patient chart reviewed by Care Coordinator\"/CCC SARATH Morton on 7/25/2023 per  notes reviewed.    CHW Plan: CHW team plan to connect with pt on week of 8/21/2023 or sooner.  "

## 2023-08-21 ENCOUNTER — PATIENT OUTREACH (OUTPATIENT)
Dept: CARE COORDINATION | Facility: CLINIC | Age: 38
End: 2023-08-21
Payer: COMMERCIAL

## 2023-08-21 NOTE — PROGRESS NOTES
"Clinic Care Coordination Contact:  [facility]/Voicemail    Reason: [facility] CHW Follow Up Called (follow up current goal's)     Clinical Data: Care Coordinator Outreach:  Outreach attempted x 1: CHW attempted to connect with patient today and no answer. No voicemail box set up. Unable to leave a voice message at this time.     Patient chart reviewed;   Per [facility] FRW encounter dated 3/09/2023 notes reviewed;   \"Program: [facility]  FRW Outreach:   3/9/23 FRW called patient and left a final vm with call back information as attempt x3 with no answer or return phone calls. FRW closed the FRW program and remove patient from panel. Patient can be referred back to FRW if needed.\"    Plan: Care Coordinator will try to reach patient again in 2-3 weeks.      "

## 2023-09-12 ENCOUNTER — PATIENT OUTREACH (OUTPATIENT)
Dept: CARE COORDINATION | Facility: CLINIC | Age: 38
End: 2023-09-12
Payer: COMMERCIAL

## 2023-09-12 NOTE — PROGRESS NOTES
Clinic Care Coordination Contact  Care Coordination Clinician Chart Review    Situation: Patient chart reviewed by Care Coordinator.       Background: Care Coordination Program started: 7/13/2022. Initial assessment completed 12/14/18 and patient-centered care plan(s) were developed with participation from patient. Lead CC handed patient off to CHW for continued outreaches.       Assessment: Per chart review, patient outreach completed by CC CHW on 8/21/23. Patient is actively working to accomplish goal(s). Patient's goal(s) appropriate and relevant at this time.     Patient is due for updated Plan of Care.      Assessments will be completed annually or as needed/with change of patient status. Due 7/13/24.        Care Plan: General       Problem: HP GENERAL PROBLEM       Goal: General Goal - I would like to get my flu shot/vaccine by the next 1 month.       Start Date: 9/26/2022    This Visit's Progress: 50% Recent Progress: 50%    Note:     Measure of Success:   Barriers: language  Strengths: Work with [facility] Scheduling patient registration team regarding to schedule appt.   Patient expressed understanding of goal: yes    Action steps to achieve this goal:  1. I will wait to hear from [facility] Patient Scheduling/Registration team to connect with me to schedule an appt with [facility] nurse. (Hearing nothing per pt on 11/16/2022)  2. My  Tiffany and I will attend our flu shot appt on 11/18/2022 at 10:10AM and 10:20AM with [facility].   3. I will follow instructions and will connect [facility] with any questions.       Note/comment:   -9/26/2022: Message route to [facility] Scheduling/pt registration pool to connect with patient per pt request. (W Sandra).    (Updated: 6/29/2023)- unable to follow up goal do to patient time.                           Problem: HP GENERAL PROBLEM       Goal: General Goal - I would like to review SNAP/food stamp eligiblity with [facility] FRW and need help [facility] the [facility] by the next 1-2 months.       Start Date: 1/4/2023    This Visit's Progress: 20% Recent Progress: 20%    Note:     General Goal- I would like to review SNAP/food stamp eligiblity with [facility] FRW and need help submitted the application [facility] the [facility] by the next 1-2 months.  Measure of Success:   Barriers: language   Strengths: Work with [facility] FRW/[facility] and the [facility] for the next step.   Patient expressed understanding of goal: yes  Action steps to achieve this goal:  1. I will wait to hear from [facility] FRW for the next step. Still need help with apply SNAP. (Updated: 2/15/2023)  2. I will try to check voicemail that left for me. (Updated: 2/15/2023)  3. I will review food stamp eligibility with FRW. (Updated: 2/15/2023)  4. I will start the SNAP application and submit to the [facility] if eligible. (Updated: 2/15/2023)    Note/comment:   -CHW relayed FRW notes encounter dated 1/03/2023 with patient per pt chart reviewed. (Dated: 1/04/2023)  -Patient is aware [facility] FRW attempted to connect with her on 1/09/2023 per pt chart reviewed notes (FRW note encounter dated 1/09/2023). (Dated: 2/15/2023).     (Updated: 6/29/2023)- not able to update goal due to pt time.                           Problem: HP GENERAL PROBLEM                   Care Plan: General       Problem: HP GENERAL PROBLEM       Goal: General Goal - I need help with resolved medical bill statement by the next 3-5 months.       Start Date: 5/30/2023    This Visit's Progress: 10%    Note:     Goal: I need help with resolved medical bill statement by the next 3-5 months.  Barriers: Language barrier  Strengths: Seeking support  Patient expressed understanding of goal: Yes    Action steps to achieve this goal:  1. I will wait to hear from [facility] for the next step regards to billing statement dropped it off to [facility] team on week of 5/30/2023. (updated: 5/30/2023)  2. I will connect with [facility] team monthly to assess and address goal. If other concerns arise, my mom  will discuss them with CCC team.  (Updated: 5/30/2023)                                 Plan/Recommendations: The patient will continue working with Care Coordination to achieve goal(s) as above.     CHW will continue outreaches at minimum every 30 days and will involve Lead CC as needed or if patient is ready to move to Maintenance.     Lead CC will continue to monitor CHW outreaches and patient's progress to goal(s) every 6 weeks.     Plan of Care updated and sent to patient: Yes, via [facility]   [facility] Care Coordinator

## 2023-09-12 NOTE — LETTER
[facility]  Patient Centered Plan of Care  About Me:        Patient Name:  Shaila Savage    YOB: 1985  Age:         37 year old   Wiliam MRN:    [id number] Telephone Information:  Home Phone Not on file.   Mobile [phone]       Address:  [address] [place] [address] Email address:  [email or web address]      Emergency Contact(s)    Name Relationship Lgl Grd Work Phone Home Phone Mobile Phone   BJ HARLEY Spouse    [phone]           Primary language:  Hmong     needed? Yes   [employer]:  [phone] op. 1  Other communication barriers:Language barrier  Preferred Method of Communication:     Current living arrangement: I live in a private home with family  Mobility Status/ Medical Equipment: Independent    Health Maintenance  Health Maintenance Reviewed: Due/Overdue   Health Maintenance Due   Topic Date Due    YEARLY PREVENTIVE VISIT  Never done    HEPATITIS B IMMUNIZATION (2 of 3 - 19+ 3-dose series) 10/10/2017    PHQ-2 (once per calendar year)  01/01/2023    INFLUENZA VACCINE (1) 09/01/2023       My Access Plan  Medical Emergency 911   Primary Clinic Line [facility] - [phone]   24 Hour Appointment Line [phone] or  [address] [place] [address] [phone]) (toll-free)   24 Hour Nurse Line [phone] (toll-free)   Preferred Urgent Care [facility], [phone]     Preferred Hospital [facility]  [phone]     Preferred Pharmacy [id number] [address] AT [facility] [address] [place]     Behavioral Health Crisis Line The National Suicide Prevention Lifeline at 1-579.321.7954 or Text/Call 878             My Care Team Members  Patient Care Team         Relationship Specialty Notifications Start End    Selene Rae MD PCP - General Family Medicine  6/30/21     Phone: [phone] Fax: [phone]         [address] [place] [address]     Sandra Fields MA Community Health Worker Primary Care - CC Admissions 2/26/21     Selene Rae MD Assigned PCP   6/16/21     Phone: [phone] Fax: [phone]         [address] [facility] [address]    Selene Carmona LSW Lead Care Coordinator Primary Care - [facility] Admissions 1/6/23 9/12/23    Phone: [phone] [address] [facility] [address]    Agatha Campos LSW Lead Care Coordinator  Admissions 9/12/23               My Care Plans  Self Management and Treatment Plan  Care Plan  Care Plan: General       Problem: HP GENERAL PROBLEM       Goal: General Goal - I would like to get my flu shot/vaccine by the next 1 month.       Start Date: 9/26/2022    This Visit's Progress: 50% Recent Progress: 50%    Note:     Measure of Success:   Barriers: language  Strengths: Work with [facility] Scheduling patient registration team regarding to schedule appt.   Patient expressed understanding of goal: yes    Action steps to achieve this goal:  1. I will wait to hear from [facility] Patient Scheduling/Registration team to connect with me to schedule an appt with [facility] nurse. (Hearing nothing per pt on 11/16/2022)  2. My  Tiffany and I will attend our flu shot appt on 11/18/2022 at 10:10AM and 10:20AM with [facility].   3. I will follow instructions and will connect [facility] with any questions.       Note/comment:   -9/26/2022: Message route to [facility] Scheduling/pt registration pool to connect with patient per pt request. (SABI Flores).    (Updated: 6/29/2023)- unable to follow up goal do to patient time.                           Problem: HP GENERAL PROBLEM       Goal: General Goal - I would like to review SNAP/food stamp eligiblity with [facility] FRW and need help submitted the application to the county by the next 1-2 months.       Start Date: 1/4/2023    This Visit's Progress: 20% Recent Progress: 20%    Note:     General Goal- I would like to review SNAP/food stamp eligiblity with [facility] FRW and need help submitted the  application to the [facility] by the next 1-2 months.  Measure of Success:   Barriers: language   Strengths: Work with [facility] FRW/[facility] and the [facility] for the next step.   Patient expressed understanding of goal: yes  Action steps to achieve this goal:  1. I will wait to hear from [facility] FRW for the next step. Still need help with apply SNAP. (Updated: 2/15/2023)  2. I will try to check voicemail that left for me. (Updated: 2/15/2023)  3. I will review food stamp eligibility with FRW. (Updated: 2/15/2023)  4. I will start the SNAP application and submit to the [facility] if eligible. (Updated: 2/15/2023)    Note/comment:   -CHW relayed FRW notes encounter dated 1/03/2023 with patient per pt chart reviewed. (Dated: 1/04/2023)  -Patient is aware [facility] FRW attempted to connect with her on 1/09/2023 per pt chart reviewed notes (FRW note encounter dated 1/09/2023). (Dated: 2/15/2023).     (Updated: 6/29/2023)- not able to update goal due to pt time.                           Problem: HP GENERAL PROBLEM                   Care Plan: General       Problem: HP GENERAL PROBLEM       Goal: General Goal - I need help with resolved medical bill statement by the next 3-5 months.       Start Date: 5/30/2023    This Visit's Progress: 10%    Note:     Goal: I need help with resolved medical bill statement by the next 3-5 months.  Barriers: Language barrier  Strengths: Seeking support  Patient expressed understanding of goal: Yes    Action steps to achieve this goal:  1. I will wait to hear from [facility] for the next step regards to billing statement dropped it off to [facility] team on week of 5/30/2023. (updated: 5/30/2023)  2. I will connect with [facility] team monthly to assess and address goal. If other concerns arise, my mom will discuss them with [facility] team.  (Updated: 5/30/2023)                               Action Plans on File:                       Advance Care Plans/Directives Type:   No data recorded    My Medical and Care  Information  Problem List   Patient Active Problem List   Diagnosis    Acne vulgaris    Female stress incontinence    Supervision of high risk pregnancy in third trimester    Class 1 obesity due to excess calories without serious comorbidity with body mass index (BMI) of 32.0 to 32.9 in adult    Microcytosis    Alpha thalassemia minor trait    Diet controlled gestational diabetes mellitus (GDM) in third trimester    Normal delivery      Current Medications and Allergies:  See printed Medication Report.    Care Coordination Start Date: 7/13/2022   Frequency of Care Coordination: monthly     Form Last Updated: 09/12/2023

## 2023-09-18 ENCOUNTER — PATIENT OUTREACH (OUTPATIENT)
Dept: CARE COORDINATION | Facility: CLINIC | Age: 38
End: 2023-09-18
Payer: COMMERCIAL

## 2023-09-18 NOTE — PROGRESS NOTES
Clinic Care Coordination Contact  Community Health Worker Follow Up    Reason: CCC CHW Follow Up Called (follow up current goal's)    Care Gaps:   Health Maintenance Due   Topic Date Due    YEARLY PREVENTIVE VISIT  Never done    HEPATITIS B IMMUNIZATION (2 of 3 - 19+ 3-dose series) 10/10/2017    PHQ-2 (once per calendar year)  01/01/2023    INFLUENZA VACCINE (1) 09/01/2023     Pt is aware of due care gaps. Pt declined CHW support with scheduling follow up appt and yearly preventive visit appt with PCP/PCP providers team and to discuss/review due care gaps details including flu shot appt with [facility] MA/nurse at this time. Pt confirmed that she will connect with PCP office to schedule appt at her time.     Care Plan:   Care Plan: General Completed 9/19/2023      Problem: HP GENERAL PROBLEM  Resolved 9/19/2023      Goal: General Goal - I would like to get my flu shot/vaccine by the next 1 month.  Completed 9/18/2023      Start Date: 9/26/2022    This Visit's Progress: 100% Recent Progress: 50%    Note:     General Goal - I would like to get my flu shot/vaccine by the next 1 month.     Measure of Success:   Barriers: language  Strengths: Work with [facility] Scheduling patient registration team regarding to schedule appt.   Patient expressed understanding of goal: yes    Action steps to achieve this goal:  1. I will wait to hear from [facility] Patient Scheduling/Registration team to connect with me to schedule an appt with [facility] nurse. (Completed)  2. My  Tiffany and I will attend our flu shot appt on 11/18/2022 at 10:10AM and 10:20AM with [facility]. (Completed)   3. I will follow instructions and will connect [facility] with any questions. (Completed)    Note/comment:   -9/26/2022: Message route to [facility] Scheduling/pt registration pool to connect with patient per pt request. (CHW Sandra).    Personal action steps:   I completed flu shot on 11/18/2022 with [facility] nurse. Met goal.   I will connect with PCP office in the  future with any questions.                            Problem: HP GENERAL PROBLEM  Resolved 9/19/2023              Problem: HP GENERAL PROBLEM  Resolved 9/19/2023      Goal: General Goal -   I need help with connected to my health care insurance company to request copy of my family medical insurance cards send to me via mail by the next 1 month.  Completed 4/13/2023      Start Date: 3/10/2023    This Visit's Progress: 100% Recent Progress: On track    Note:     Goal: I need help with connected to my health care insurance company to request copy of my family medical insurance cards send to me via mail by the next 1 month.     Measure of Success: n/a  Barriers: Language  Strengths: Working with my CHW and [facility] team.  Patient expressed understanding of goal: yes    Action steps to achieve this Goal:  1. I will wait to hear from my CHW next week, 3/13/2023. (Completed; 3/13/2023-CHW MX)  2. I will wait to receive me and my two child (Jolie and Andrew) medical insurance cards from [employer] via mail by the next 7-10 business days. (Completed; 4/13/2023)  3. I will updates status with my CHW at next outreach follow up. (Completed; 4/13/2023)    Note/comment:   -3/13/2023: CHW with pt conference called to [employer] at [phone] and requested pt and two child medical insurance cards send to pt via mail per pt request. Patient [employer] ID#: [id number].    Personal action steps:   I had received all the medical insurance card from [employer]. Met goal. Thank you CCC.  My  and I will connect with [employer] in the future with any questions.                               Care Plan: General       Problem: HP GENERAL PROBLEM       Goal: General Goal - I need help with resolved medical bill statement by the next 3-5 months.       Start Date: 5/30/2023    This Visit's Progress: 50% Recent Progress: 10%    Note:     Goal: I need help with resolved medical bill statement by the next 3-5  "months.  Barriers: Language barrier  Strengths: Seeking support  Patient expressed understanding of goal: Yes    Action steps to achieve this goal:  1. I will wait to hear from [facility] for the next step regards to billing statement dropped it off to [facility] team on week of 5/30/2023. (Completed)- Spoke with billing office; bill forward to medical insurance. No longer received same medical bill per pt on 9/18/2023.   2. I will connect with [facility] team monthly to assess and address goal. If other concerns arise, my mom will discuss them with [facility] team.  (Updated: 9/18/2023)  3. I will review incoming mails see if the same medical bill statement send again and updates status with CHW at next outreach follow up. (Updated: 9/18/2023)                            Patient chart reviewed;   Per [facility] FRW encounter dated 3/09/2023 notes reviewed;   \"Program: [facility]: [facility] Outreach:   3/9/23 FRW called patient and left a final vm with call back information as attempt x3 with no answer or return phone calls. FRW closed the FRW program and remove patient from panel. Patient can be referred back to W if needed.\"    Goal deleted today per pt request:   General Goal- \"I would like to review SNAP/food stamp eligiblity with [facility] FRW and need help submitted the application to the [facility] by the next 1-2 months.\"  (Date goal deleted (per pt request): 9/18/2023)- Reason: no longer interested apply SNAP due household income may be over the eligibility guideline at this time.     Patient Outreach Discussion:   [facility] CHW was able to connect with patient today regard to reason above.  Pt declined getting flu shot appt with [facility] MA/nurse. Please see Care Gaps above for details if need. Verified pt interest working towards SNAP goal achievement. Pt shared info below. Updated, completed, and deleted goal's above per patient request.    Patient shared:  -No longer interested working toward food stamp goal due to household income may be " over. Spouse employed. Delete goal. Will connect CCC if change mind.   -Doing well. No other questions or concerns at this time.     CHW suggested patient for the following:  -Pt connect CCC/CHW with any additional resources need and PCP office for scheduling appt.    CHW Next Follow-up: Goal follow up. Informed patient of due care gaps (if any).     Outreach frequency: monthly      CHW Plan:   -Pt connect with PCP office to schedule follow up or yearly preventive appt.   -Next CHW outreach plan: 1 month.

## 2023-09-25 ENCOUNTER — ALLIED HEALTH/NURSE VISIT (OUTPATIENT)
Dept: FAMILY MEDICINE | Facility: CLINIC | Age: 38
End: 2023-09-25
Payer: COMMERCIAL

## 2023-09-25 DIAGNOSIS — Z23 ENCOUNTER FOR IMMUNIZATION: Primary | ICD-10-CM

## 2023-09-25 PROCEDURE — 90686 IIV4 VACC NO PRSV 0.5 ML IM: CPT

## 2023-09-25 PROCEDURE — 99207 PR NO CHARGE NURSE ONLY: CPT

## 2023-09-25 PROCEDURE — 90471 IMMUNIZATION ADMIN: CPT

## 2023-10-18 ENCOUNTER — PATIENT OUTREACH (OUTPATIENT)
Dept: CARE COORDINATION | Facility: CLINIC | Age: 38
End: 2023-10-18
Payer: COMMERCIAL

## 2023-10-18 NOTE — PROGRESS NOTES
Clinic Care Coordination Contact  Community Health Worker Follow Up    Reason: [facility] CHW Follow Up Called (follow up current goal's)    Care Gaps:   Health Maintenance Due   Topic Date Due    YEARLY PREVENTIVE VISIT  Never done    HEPATITIS B IMMUNIZATION (2 of 3 - 19+ 3-dose series) 10/10/2017    PHQ-2 (once per calendar year)  01/01/2023    COVID-19 Vaccine (4 - 2023-24 season) 09/01/2023     Patient agreed appt scheduling. Pt agreed to schedule appt with any [facility] providers per CHW verified. CHW support with this. Pt preventive/annual wellness visit appt schedule 11/30/2023 at 9:40AM with Dr. Mancia per pt agreed.     Care Plan:   Care Plan: General Completed 9/19/2023      Problem: HP GENERAL PROBLEM  Resolved 9/19/2023      Goal: General Goal - I would like to get my flu shot/vaccine by the next 1 month.  Completed 9/18/2023      Start Date: 9/26/2022    This Visit's Progress: 100% Recent Progress: 50%    Note:     General Goal - I would like to get my flu shot/vaccine by the next 1 month.     Measure of Success:   Barriers: language  Strengths: Work with [facility] Scheduling patient registration team regarding to schedule appt.   Patient expressed understanding of goal: yes    Action steps to achieve this goal:  1. I will wait to hear from [facility] Patient Scheduling/Registration team to connect with me to schedule an appt with [facility] nurse. (Completed)  2. My  Tiffany and I will attend our flu shot appt on 11/18/2022 at 10:10AM and 10:20AM with [facility]. (Completed)   3. I will follow instructions and will connect [facility] with any questions. (Completed)    Note/comment:   -9/26/2022: Message route to [facility] Scheduling/pt registration pool to connect with patient per pt request. (CHW Sandra).    Personal action steps:   I completed flu shot on 11/18/2022 with [facility] nurse. Met goal.   I will connect with PCP office in the future with any questions.                            Problem: HP GENERAL PROBLEM   Resolved 9/19/2023              Problem: HP GENERAL PROBLEM  Resolved 9/19/2023      Goal: General Goal -   I need help with connected to my health care insurance company to request copy of my family medical insurance cards send to me via mail by the next 1 month.  Completed 4/13/2023      Start Date: 3/10/2023    This Visit's Progress: 100% Recent Progress: On track    Note:     Goal: I need help with connected to my health care insurance company to request copy of my family medical insurance cards send to me via mail by the next 1 month.     Measure of Success: n/a  Barriers: Language  Strengths: Working with my CHW and [facility] team.  Patient expressed understanding of goal: yes    Action steps to achieve this Goal:  1. I will wait to hear from my CHW next week, 3/13/2023. (Completed; 3/13/2023-CHW MX)  2. I will wait to receive me and my two child (Jolie and Andrew) medical insurance cards from [employer] via mail by the next 7-10 business days. (Completed; 4/13/2023)  3. I will updates status with my CHW at next outreach follow up. (Completed; 4/13/2023)    Note/comment:   -3/13/2023: CHW with pt conference called to [employer] at [phone] and requested pt and two child medical insurance cards send to pt via mail per pt request. Patient [employer] ID#: [id number].    Personal action steps:   I had received all the medical insurance card from [employer]. Met goal. Thank you CCC.  My  and I will connect with [employer] in the future with any questions.                               Care Plan: General Completed 10/20/2023      Problem: HP GENERAL PROBLEM  Resolved 10/20/2023      Goal: General Goal - I need help with resolved medical bill statement by the next 3-5 months.  Completed 10/18/2023      Start Date: 5/30/2023    This Visit's Progress: 100% Recent Progress: 50%    Note:     Goal: I need help with resolved medical bill statement by the next 3-5 months.  Barriers: Language  barrier  Strengths: Seeking support  Patient expressed understanding of goal: Yes    Action steps to achieve this goal:  1. I will wait to hear from [facility] for the next step regards to billing statement dropped it off to CCC team on week of 5/30/2023. (Completed)- Spoke with billing office; bill forward to medical insurance. No longer received same medical bill per pt on 9/18/2023.   2. I will connect with CCC team monthly to assess and address goal. If other concerns arise, my mom will discuss them with CCC team.  (Completed per pt confirmed on 10/18/2023)  3. I will review incoming mails see if the same medical bill statement send again and updates status with CHW at next outreach follow up. (Completed per pt confirmed on 10/18/2023)    Personal action steps:   I am no longer received the same medical bill statement for months. Believed it must be paid/cover by my health care insurance company. Met goal.  I will have spouse assist with connect to health care insurance company in the future if any questions. Thank you.                        Problem: HP GENERAL PROBLEM  Resolved 10/20/2023                  Care Plan: General       Problem: HP GENERAL PROBLEM       Goal: General Goal - I would like to discuss and completed preventive/annual wellness visit, Hepatitis B and COVID-19 vaccine #4 before end of December 2023.       Start Date: 10/18/2023    This Visit's Progress: 10%    Note:     Measure of Success: NA  Barriers: Language  Strengths: Spouse/family member assisted to appt as schedule, and connect with PCP office to schedule additional follow up appt as need.   Patient expressed understanding of goal: yes    Action steps to achieve this goal:  1. I will attend the preventive visit appt on 11/30/2023 at 9:40AM with Dr. Mancia in [facility].   2. I will discuss due care gaps details and seeking advised with Dr. Mancia on 11/30/2023.   3. I will updates status with CCC team at next outreach follow up.                             Patient Outreach Discussion:   CHW was able to connect with patient regard to reason above. Check in how patient is doing and any questions or concerns.   Encouraged pt to schedule preventive visit appt with PCP/PCP providers team to discuss due care gaps completion. Pt agreed.  Pt set a new goal to complete preventive/annual wellness visit, Hepatitis B and COVID-19 vaccine #4 before end of December 2023.  Patient appt schedule 11/30/2023 at 9:40AM with Dr. Mancia in [facility].     Patient shared:   -No longer received the same medical bill statement for months now. Met goal.   -Doing well. No other questions or concerns at this time.    CHW suggested patient for the following:  -Pt connect CCC/CHW with any additional resources need and PCP office for scheduling appt.    CHW Next Follow-up: Goal follow up. Informed patient of due care gaps (if any).     Outreach frequency: monthly      CHW Plan:   -Patient attend preventive visit appt: 11/30/2023 at 9:40AM with Dr. Mancia in [facility]. Pt discuss due care gaps detail completion. Pt schedule follow up appt as recommend by Dr. Mancia.  -Next CHW outreach plan: 1 month.

## 2023-10-30 ENCOUNTER — PATIENT OUTREACH (OUTPATIENT)
Dept: CARE COORDINATION | Facility: CLINIC | Age: 38
End: 2023-10-30
Payer: COMMERCIAL

## 2023-10-30 NOTE — PROGRESS NOTES
Care Coordination Clinician Chart Review    Situation: Patient chart reviewed by Care Coordinator.       Background: Care Coordination Program started: 7/13/2022. Initial assessment completed and patient-centered care plan(s) were developed with participation from patient. Lead CC handed patient off to CHW for continued outreaches.       Assessment: Per chart review, patient outreach completed by CC CHW on 10/18/2023.  Patient is actively working to accomplish goal(s). Patient's goal(s) appropriate and relevant at this time. Patient is not due for updated Plan of Care.  Assessments will be completed annually or as needed/with change of patient status.      Care Plan: General       Problem: HP GENERAL PROBLEM       Goal: General Goal - I would like to discuss and completed preventive/annual wellness visit, Hepatitis B and COVID-19 vaccine #4 before end of December 2023.       Start Date: 10/18/2023    This Visit's Progress: 10%    Note:     Measure of Success: NA  Barriers: Language  Strengths: Spouse/family member assisted to appt as schedule, and connect with PCP office to schedule additional follow up appt as need.   Patient expressed understanding of goal: yes    Action steps to achieve this goal:  1. I will attend the preventive visit appt on 11/30/2023 at 9:40AM with Dr. Mancia in [facility].   2. I will discuss due care gaps details and seeking advised with Dr. Mancia on 11/30/2023.   3. I will updates status with CCC team at next outreach follow up.                                 Plan/Recommendations: The patient will continue working with Care Coordination to achieve goal(s) as above. CHW will continue outreaches at minimum every 30 days and will involve Lead CC as needed or if patient is ready to move to Maintenance. Lead CC will continue to monitor CHW outreaches and patient's progress to goal(s) every 6 weeks.     Plan of Care updated and sent to patient: MOSES Velásquez   Social Work Care Coordinator   [facility]  [facility]    [phone]

## 2023-11-07 ENCOUNTER — APPOINTMENT (OUTPATIENT)
Dept: INTERPRETER SERVICES | Facility: CLINIC | Age: 38
End: 2023-11-07
Payer: COMMERCIAL

## 2023-11-29 ENCOUNTER — PATIENT OUTREACH (OUTPATIENT)
Dept: CARE COORDINATION | Facility: CLINIC | Age: 38
End: 2023-11-29
Payer: COMMERCIAL

## 2023-12-11 ENCOUNTER — PATIENT OUTREACH (OUTPATIENT)
Dept: CARE COORDINATION | Facility: CLINIC | Age: 38
End: 2023-12-11
Payer: COMMERCIAL

## 2023-12-11 NOTE — PROGRESS NOTES
Care Coordination Clinician Chart Review    Situation: Patient chart reviewed by Care Coordinator.       Background: Care Coordination Program started: 7/13/2022. Initial assessment completed and patient-centered care plan(s) were developed with participation from patient. Lead CC handed patient off to CHW for continued outreaches.       Assessment: Per chart review, patient outreach completed by CC CHW on 11/29/2023.  Patient is actively working to accomplish goal(s). Patient's goal(s) appropriate and relevant at this time. Patient is not due for updated Plan of Care.  Assessments will be completed annually or as needed/with change of patient status.      Care Plan: General       Problem: HP GENERAL PROBLEM       Goal: General Goal - I would like to discuss and completed preventive/annual wellness visit, Hepatitis B and COVID-19 vaccine #4 before end of December 2023.       Start Date: 10/18/2023    This Visit's Progress: 30% Recent Progress: 10%    Note:     Measure of Success: NA  Barriers: Language  Strengths: Spouse/family member assisted to appt as schedule, and connect with PCP office to schedule additional follow up appt as need.   Patient expressed understanding of goal: yes    Action steps to achieve this goal:  1. I will attend the preventive visit appt on 11/30/2023 at 9:40AM with Dr. Mancia in [facility]. (Completed. Updated- appt rescheduled to 12/14/2023 due to daughter appt conflict per pt on 11/29/2023).   2. I will discuss due care gaps details and seeking advised with Dr. Mancia on 11/30/2023. (Completed. Updated- appt rescheduled to 12/14/2023 due to daughter appt conflict per pt on 11/29/2023).   3. I will updates status with CCC team at next outreach follow up. (Updated: 11/29/2023)  4. I will attend appt rescheduled on 12/14/2023 with Dr. Mancia in [facility]. (Updated: 11/29/2023)                                 Plan/Recommendations: The patient will continue working with Care Coordination to achieve goal(s) as  above. CHW will continue outreaches at minimum every 30 days and will involve Lead CC as needed or if patient is ready to move to Maintenance. Lead CC will continue to monitor CHW outreaches and patient's progress to goal(s) every 6 weeks.     Plan of Care updated and sent to patient: MOSES Velásquez   Social Work Care Coordinator   [facility]    [phone]

## 2023-12-14 ENCOUNTER — TELEPHONE (OUTPATIENT)
Dept: FAMILY MEDICINE | Facility: CLINIC | Age: 38
End: 2023-12-14

## 2023-12-14 ENCOUNTER — OFFICE VISIT (OUTPATIENT)
Dept: FAMILY MEDICINE | Facility: CLINIC | Age: 38
End: 2023-12-14
Payer: COMMERCIAL

## 2023-12-14 VITALS
TEMPERATURE: 99.1 F | OXYGEN SATURATION: 97 % | RESPIRATION RATE: 16 BRPM | SYSTOLIC BLOOD PRESSURE: 106 MMHG | HEIGHT: 58 IN | HEART RATE: 77 BPM | DIASTOLIC BLOOD PRESSURE: 70 MMHG | BODY MASS INDEX: 26.87 KG/M2 | WEIGHT: 128 LBS

## 2023-12-14 DIAGNOSIS — Z00.00 ROUTINE GENERAL MEDICAL EXAMINATION AT A HEALTH CARE FACILITY: Primary | ICD-10-CM

## 2023-12-14 DIAGNOSIS — Z86.32 HISTORY OF GESTATIONAL DIABETES: ICD-10-CM

## 2023-12-14 DIAGNOSIS — D56.3 ALPHA THALASSEMIA MINOR TRAIT: ICD-10-CM

## 2023-12-14 DIAGNOSIS — B37.31 YEAST VAGINITIS: ICD-10-CM

## 2023-12-14 LAB
CHOLEST SERPL-MCNC: 187 MG/DL
CLUE CELLS: ABNORMAL
FASTING STATUS PATIENT QL REPORTED: NO
HDLC SERPL-MCNC: 49 MG/DL
HGB BLD-MCNC: 12.6 G/DL (ref 11.7–15.7)
LDLC SERPL CALC-MCNC: 103 MG/DL
NONHDLC SERPL-MCNC: 138 MG/DL
TRICHOMONAS, WET PREP: ABNORMAL
TRIGL SERPL-MCNC: 174 MG/DL
TSH SERPL DL<=0.005 MIU/L-ACNC: 1.33 UIU/ML (ref 0.3–4.2)
WBC'S/HIGH POWER FIELD, WET PREP: ABNORMAL
YEAST, WET PREP: PRESENT

## 2023-12-14 PROCEDURE — 87210 SMEAR WET MOUNT SALINE/INK: CPT | Performed by: FAMILY MEDICINE

## 2023-12-14 PROCEDURE — 99213 OFFICE O/P EST LOW 20 MIN: CPT | Mod: 25 | Performed by: FAMILY MEDICINE

## 2023-12-14 PROCEDURE — 83036 HEMOGLOBIN GLYCOSYLATED A1C: CPT | Performed by: FAMILY MEDICINE

## 2023-12-14 PROCEDURE — 99395 PREV VISIT EST AGE 18-39: CPT | Mod: 25 | Performed by: FAMILY MEDICINE

## 2023-12-14 PROCEDURE — 36415 COLL VENOUS BLD VENIPUNCTURE: CPT | Performed by: FAMILY MEDICINE

## 2023-12-14 PROCEDURE — 91320 SARSCV2 VAC 30MCG TRS-SUC IM: CPT | Performed by: FAMILY MEDICINE

## 2023-12-14 PROCEDURE — 85018 HEMOGLOBIN: CPT | Performed by: FAMILY MEDICINE

## 2023-12-14 PROCEDURE — 80061 LIPID PANEL: CPT | Performed by: FAMILY MEDICINE

## 2023-12-14 PROCEDURE — 90480 ADMN SARSCOV2 VAC 1/ONLY CMP: CPT | Performed by: FAMILY MEDICINE

## 2023-12-14 PROCEDURE — 84443 ASSAY THYROID STIM HORMONE: CPT | Performed by: FAMILY MEDICINE

## 2023-12-14 RX ORDER — FLUCONAZOLE 150 MG/1
150 TABLET ORAL ONCE
Qty: 2 TABLET | Refills: 0 | Status: SHIPPED | OUTPATIENT
Start: 2023-12-14 | End: 2023-12-14

## 2023-12-14 ASSESSMENT — ENCOUNTER SYMPTOMS
SHORTNESS OF BREATH: 0
HEARTBURN: 0
CHILLS: 0
PALPITATIONS: 0
EYE PAIN: 0
MYALGIAS: 0
DYSURIA: 0
HEMATOCHEZIA: 0
FEVER: 0
FREQUENCY: 0
DIARRHEA: 0
DIZZINESS: 0
SORE THROAT: 0
JOINT SWELLING: 0
BREAST MASS: 0
HEADACHES: 0
CONSTIPATION: 0
ARTHRALGIAS: 0
HEMATURIA: 0
NAUSEA: 0
ABDOMINAL PAIN: 0
COUGH: 0
PARESTHESIAS: 0
WEAKNESS: 0
NERVOUS/ANXIOUS: 0

## 2023-12-14 NOTE — TELEPHONE ENCOUNTER
----- Message from Beau Mancia MD sent at 12/14/2023 11:43 AM CST -----  Please call. She has yeast vaginal infection.  Rx sent. Repeat in three to four days if not better.

## 2023-12-14 NOTE — COMMUNITY RESOURCES LIST (ENGLISH)
12/14/2023   [facility] - Outpatient Clinics  N/A  For additional resource needs, please contact your health insurance member services or your primary care team.  Phone: [phone]   Email: N/A   Address: [facility] [place] [address]   Hours: N/A        Food and Nutrition       Food pantry  1  [facility] Distance: 0.22 miles      Pickup   [address] [place] [address]  Language: English  Hours: Tue 4:00 PM - 6:00 PM  Fees: Free   Phone: (319) 379-1818 Email: office@[employer].pw Website: http://meXBT / [employer]in[employer].pw     2  [facility] - Food [facility] Distance: 1.21 miles      Pickup   [address] [place] [address]  Language: English  Hours: Fri 5:00 PM - 7:00 PM  Fees: Free   Phone: (975) 438-1288 Email: office@[employer].org Website: http://www.[employer].org     SNAP application assistance  3  [employer] [place] Distance: 11.92 miles      Phone/Virtual   [address] [address]  Language: English, Hmong, Malawian, Bangladeshi, Greenlandic  Hours: Mon - Fri 8:30 AM - 4:30 PM  Fees: Free   Phone: (829) 741-5114 Email: helpline@[employer]ers[employer].org Website: https://www.hungersolutions.org/programs/[place]-food-helpline/     4  [facility] -  [employer] [employer] [place] Family Wellness (AIFW) Distance: 1.97 miles      In-Person, Phone/Virtual   [address] [place] [address]  Language: Swedish, Czech, English, Gujarati, Melody, Serbian, Greek, Arabic, Yakut, Tajik  Hours: Mon - Wed 9:00 AM - 5:00 PM , Thu 12:00 PM - 6:00 PM , Fri 9:00 AM - 5:00 PM , Sun 10:30 AM - 2:00 PM Appt. Only  Fees: Free   Phone: (869) 559-6323 Email: info@carly-aifw.org Website: https://www.sewa-aifw.org/     Soup kitchen or free meals  5  [facility] - Family Table Meal Distance: 0.64 miles      [address] [address]  Language: English  Hours: Sat 11:30 AM - 12:30 PM  Fees: Free   Phone:  (349) 639-6025 Email: jcarlos@[facility] Website: http://www.[facility].org/     6  [facility] Community Supper Distance: 1.21 miles      In-Person   [address] [place] [address]  Language: English  Hours: Wed 5:15 PM - 6:00 PM  Fees: Free   Phone: (541) 883-3532 Email: info@[facility].org Website: http://www.[facility].org/          Important Numbers & Websites       [place] [address] 211itedway.org  Poison Control   (805) 409-2923 [place]  Suicide and Crisis Lifeline   [address] [place], [place] [address]line.org  Childhelp [place] Child Abuse Hotline   811.797.7427 Childhelphotline.org  [place] Sexual Assault Hotline   (640) 706-5430 (HOPE) [facility] Runaway Safeline   (703) 329-6388 (RUNAWAY) [facility].org  Pregnancy & Postpartum Support [place]   Call/text 310-733-6085 Ppsupportmn.org  Substance Abuse National Helpline ([facility]   173-170-HELP (5681) Findtreatment.gov  Emergency Services   919

## 2023-12-14 NOTE — PROGRESS NOTES
SUBJECTIVE:   Shaila is a 38 year old, presenting for the following:  Physical  Here for annual physical.   Has some vaginal irritation and discharge, wants to checked for an infection. No abnormal odor. No concern for sexually transmitted infection.   She notes history of gestational diabetes, would like screening for diabetes.         12/14/2023     9:42 AM   Additional Questions   Roomed by Seema       Healthy Habits:     Getting at least 3 servings of Calcium per day:  Yes    Bi-annual eye exam:  Yes    Dental care twice a year:  Yes    Sleep apnea or symptoms of sleep apnea:  None    Diet:  Regular (no restrictions)    Frequency of exercise:  6-7 days/week    Duration of exercise:  15-30 minutes    Taking medications regularly:  Not Applicable    Medication side effects:  Not applicable    Additional concerns today:  No      Today's PHQ-2 Score:       12/14/2023     9:40 AM   PHQ-2 ( 1999 [employer])   Q1: Little interest or pleasure in doing things 0   Q2: Feeling down, depressed or hopeless 0   PHQ-2 Score 0   Q1: Little interest or pleasure in doing things Not at all   Q2: Feeling down, depressed or hopeless Not at all   PHQ-2 Score 0         Social History     Tobacco Use    Smoking status: Never    Smokeless tobacco: Never    Tobacco comments:     no exposure   Substance Use Topics    Alcohol use: No             12/14/2023     9:40 AM   Alcohol Use   Prescreen: >3 drinks/day or >7 drinks/week? Not Applicable     Reviewed orders with patient.  Reviewed health maintenance and updated orders accordingly - Yes  Lab work is in process    Breast Cancer Screening:  Any new diagnosis of family breast, ovarian, or bowel cancer? No    FHS-7:        No data to display                Patient under 40 years of age: Routine Mammogram Screening not recommended.   Pertinent mammograms are reviewed under the imaging tab.    History of abnormal Pap smear: NO - age 30-65 PAP every 5 years with negative HPV co-testing  recommended      Latest Ref Rng & Units 2019    10:01 AM 2018    10:41 AM   PAP / HPV   PAP Negative for squamous intraepithelial lesion or malignancy. Negative for squamous intraepithelial lesion or malignancy  Electronically signed by Nancie Villarreal CT (ASCP) on 2019 at  3:21 PM    Negative for squamous intraepithelial lesion or malignancy  Electronically signed by Alannah Boles CT (ASCP) on 2018 at  2:36 PM      HPV 16 DNA NEG Negative  Negative    HPV 18 DNA NEG Negative  Negative    Other HR HPV NEG Negative  Positive      Reviewed and updated as needed this visit by clinical staff   Tobacco  Allergies  Meds              Reviewed and updated as needed this visit by Provider                 Past Medical History:   Diagnosis Date    Group B streptococcal carriage complicating pregnancy 2017    High risk HPV infection 2018    Normal delivery 2017    Normal delivery 2017    Normal pregnancy 10/12/2018      Past Surgical History:   Procedure Laterality Date    DILATION AND CURETTAGE N/A 10/26/2020    Procedure: DILATION AND CURETTAGE, UTERUS, USING SUCTION;  Surgeon: Ginny Terrazas MD;  [facility] [place] [facility];  Service: Obstetrics     OB History    Para Term  AB Living   6 3 3 0 3 3   SAB IAB Ectopic Multiple Live Births   1 0 0 0 3      # Outcome Date GA Lbr Juan/2nd Weight Sex Delivery Anes PTL Lv   6 Term 21 39w4d 02:50 / 00:06 3.47 kg (7 lb 10.4 oz) M Vag-Spont Nitrous N CAR      Name: EMELIAMALE-MAIVUE      Apgar1: 8  Apgar5: 9   5 AB 10/26/20 11w2d          4 SAB 10/08/19 6w0d    SAB      3 Term 19 39w6d 10:37 / 00:10 3.715 kg (8 lb 3 oz) M Vag-Spont Nitrous N CAR      Complications: Fetal Intolerance      Name: Andrew Barrow      Apgar1: 3  Apgar5: 8   2 Term 17 39w4d 07:53 / 00:39 2.863 kg (6 lb 5 oz) F Vag-Spont Nitrous, Local N CAR      Name: Dania Barrow      Apgar1: 8  Apgar5: 9   1 AB           "    Family History   Problem Relation Age of Onset    No Known Problems Mother     No Known Problems Father     No Known Problems Sister     No Known Problems Brother     No Known Problems Sister     No Known Problems Brother     No Known Problems Brother     Jaundice Daughter             Blindness Daughter         right eye, Microphthalmia, Chronic Retinal detachment        Review of Systems   Constitutional:  Negative for chills and fever.   HENT:  Negative for congestion, ear pain, hearing loss and sore throat.    Eyes:  Positive for visual disturbance. Negative for pain.   Respiratory:  Negative for cough and shortness of breath.    Cardiovascular:  Negative for chest pain, palpitations and peripheral edema.   Gastrointestinal:  Negative for abdominal pain, constipation, diarrhea, heartburn, hematochezia and nausea.   Breasts:  Negative for tenderness, breast mass and discharge.   Genitourinary:  Negative for dysuria, frequency, genital sores, hematuria, pelvic pain, urgency, vaginal bleeding and vaginal discharge.   Musculoskeletal:  Negative for arthralgias, joint swelling and myalgias.   Skin:  Positive for rash.   Neurological:  Negative for dizziness, weakness, headaches and paresthesias.   Psychiatric/Behavioral:  Negative for mood changes. The patient is not nervous/anxious.         OBJECTIVE:   /70 (BP Location: Right arm, Patient Position: Sitting, Cuff Size: Adult Regular)   Pulse 77   Temp 99.1  F (37.3  C) (Oral)   Resp 16   Ht 1.465 m (4' 9.68\")   Wt 58.1 kg (128 lb)   LMP 2023 (Approximate)   SpO2 97%   Breastfeeding No   BMI 27.05 kg/m    Physical Exam  GENERAL: healthy, alert and no distress  EYES: Eyes grossly normal to inspection, PERRL and conjunctivae and sclerae normal  NECK: no adenopathy, no asymmetry, masses, or scars and thyroid normal to palpation  RESP: lungs clear to auscultation - no rales, rhonchi or wheezes  BREAST: normal without masses, tenderness or " nipple discharge and no palpable axillary masses or adenopathy  CV: regular rate and rhythm, normal S1 S2, no S3 or S4, no murmur, click or rub, no peripheral edema and peripheral pulses strong  ABDOMEN: soft, nontender, no hepatosplenomegaly, no masses and bowel sounds normal   (female): normal female external genitalia, normal urethral meatus, vaginal mucosa, normal cervix/adnexa/uterus without masses or discharge  MS: no gross musculoskeletal defects noted, no edema    Diagnostic Test Results:  Labs reviewed in Epic  Results for orders placed or performed in visit on 12/14/23   Lipid Profile (Chol, Trig, HDL, LDL calc)     Status: Abnormal   Result Value Ref Range    Cholesterol 187 <200 mg/dL    Triglycerides 174 (H) <150 mg/dL    Direct Measure HDL 49 (L) >=50 mg/dL    LDL Cholesterol Calculated 103 (H) <=100 mg/dL    Non HDL Cholesterol 138 (H) <130 mg/dL    Patient Fasting > 8hrs? No     Narrative    Cholesterol  Desirable:  <200 mg/dL    Triglycerides  Normal:  Less than 150 mg/dL  Borderline High:  150-199 mg/dL  High:  200-499 mg/dL  Very High:  Greater than or equal to 500 mg/dL    Direct Measure HDL  Female:  Greater than or equal to 50 mg/dL   Male:  Greater than or equal to 40 mg/dL    LDL Cholesterol  Desirable:  <100mg/dL  Above Desirable:  100-129 mg/dL   Borderline High:  130-159 mg/dL   High:  160-189 mg/dL   Very High:  >= 190 mg/dL    Non HDL Cholesterol  Desirable:  130 mg/dL  Above Desirable:  130-159 mg/dL  Borderline High:  160-189 mg/dL  High:  190-219 mg/dL  Very High:  Greater than or equal to 220 mg/dL   Hemoglobin     Status: Normal   Result Value Ref Range    Hemoglobin 12.6 11.7 - 15.7 g/dL   TSH with free T4 reflex     Status: Normal   Result Value Ref Range    TSH 1.33 0.30 - 4.20 uIU/mL   **Hemoglobin A1c FUTURE 3mo     Status: Normal   Result Value Ref Range    Hemoglobin A1C 5.6 0.0 - 5.6 %   Wet prep - Clinic Collect     Status: Abnormal    Specimen: Vagina; Swab   Result  "Value Ref Range    Trichomonas Absent Absent    Yeast Present (A) Absent    Clue Cells Absent Absent    WBCs/high power field 3+ (A) None       ASSESSMENT/PLAN:   (Z00.00) Routine general medical examination at a health care facility  (primary encounter diagnosis)  Plan: Lipid Profile (Chol, Trig, HDL, LDL calc),         **Hemoglobin A1c FUTURE 3mo, Hemoglobin, TSH         with free T4 reflex, Wet prep - Clinic Collect  (D56.3) Alpha thalassemia minor trait  Plan: Hemoglobin  (Z86.32) History of gestational diabetes  (B37.31) Yeast vaginitis  Plan: fluconazole (DIFLUCAN) 150 MG tablet  EHR reviewed.   Past medical history, problem list, past surgical history, family history, social history, medications reviewed, updated, reconciled.   Pap smear up to date.   Requested lipid profile and screening for diabetes, this was collected. Check on hemoglobin with history of thalassemia trait.   We prep collected. Treated for yeast vaginitis.      Patient has been advised of split billing requirements and indicates understanding: Yes      COUNSELING:  Reviewed preventive health counseling, as reflected in patient instructions      BMI:   Estimated body mass index is 27.05 kg/m  as calculated from the following:    Height as of this encounter: 1.465 m (4' 9.68\").    Weight as of this encounter: 58.1 kg (128 lb).   Weight management plan: Discussed healthy diet and exercise guidelines      She reports that she has never smoked. She has never used smokeless tobacco.          Beau Mancia MD  [facility]  "

## 2023-12-14 NOTE — COMMUNITY RESOURCES LIST (ENGLISH)
12/14/2023   [facility] - Outpatient Clinics  N/A  For additional resource needs, please contact your health insurance member services or your primary care team.  Phone: [phone]   Email: N/A   Address: [facility] [place] [address]   Hours: N/A        Food and Nutrition       Food pantry  1  [facility] - [facility] access hospital Distance: 0.22 miles      Pickup   [address] [place] [address]  Language: English  Hours: Tue 4:00 PM - 6:00 PM  Fees: Free   Phone: (536) 922-4827 Email: office@[employer].Compufirst Website: http://[employer].Compufirst     2  [facility] - Food Distribution Distance: 1.21 miles      Pickup   [address] [place] [address]  Language: English  Hours: Fri 5:00 PM - 7:00 PM  Fees: Free   Phone: (847) 383-4360 Email: office@[employer].org Website: http://www.[employer].org     SNAP application assistance  3  [employer] [place] Distance: 11.92 miles      Phone/Virtual   [address] [address]  Language: English, Hmong, Tunisian, Mozambican, Djiboutian  Hours: Mon - Fri 8:30 AM - 4:30 PM  Fees: Free   Phone: (408) 790-1838 Email: helpline@[employer]ers[employer].org Website: https://www.hungersolutions.org/programs/[place]-food-helpline/     4  [facility] -  [place] [facility] (AIFW) Distance: 1.97 miles      In-Person, Phone/Virtual   [address] [place] [address]  Language: Korean, Wolof, English, Gujarati, Melody, Wolof, Malay, Kiswahili, Nepali, Setswana  Hours: Mon - Wed 9:00 AM - 5:00 PM , Thu 12:00 PM - 6:00 PM , Fri 9:00 AM - 5:00 PM , Sun 10:30 AM - 2:00 PM Appt. Only  Fees: Free   Phone: (501) 725-9463 Email: info@carly-aifw.org Website: https://www.sewa-aifw.org/     Soup kitchen or free meals  5  [facility] - Family Table Meal Distance: 0.64 miles      [address] [address]  Language: English  Hours: Sat 11:30 AM - 12:30 PM  Fees: Free   Phone:  (247) 104-7212 Email: cjarlos@[facility] Website: http://www.[facility].org/     6  [facility] Community Supper Distance: 1.21 miles      In-Person   [address] [place] [address]  Language: English  Hours: Wed 5:15 PM - 6:00 PM  Fees: Free   Phone: (912) 270-4073 Email: info@[facility].org Website: http://www.[facility].org/          Important Numbers & Websites       [place] [address] 211itedway.org  Poison Control   (590) 804-9994 [place]po[place].org  Suicide and Crisis Lifeline   [address] [place], [place] [address]line.org  Childhelp [place] Child Abuse Hotline   110.628.5263 Childhelphotline.org  [place] Sexual Assault Hotline   (942) 706-1468 (HOPE) [facility] Runaway Safeline   (677) 392-6785 (RUNAWAY) [facility] Lakerunaway.org  Pregnancy & Postpartum Support [place]   Call/text 779-792-2146 Ppsupportmn.org  Substance Abuse National Helpline ([facility]   437-057-HELP (1657) Findtreatment.gov  Emergency Services   916

## 2023-12-15 LAB — HBA1C MFR BLD: 5.6 % (ref 0–5.6)

## 2024-01-23 ENCOUNTER — PATIENT OUTREACH (OUTPATIENT)
Dept: CARE COORDINATION | Facility: CLINIC | Age: 39
End: 2024-01-23
Payer: COMMERCIAL

## 2024-01-23 NOTE — PROGRESS NOTES
Care Coordination Clinician Chart Review    Situation: Patient chart reviewed by Care Coordinator.       Background: Care Coordination Program started: 7/13/2022. Initial assessment completed and patient-centered care plan(s) were developed with participation from patient. Lead CC handed patient off to CHW for continued outreaches.       Assessment: Per chart review, patient outreach completed by CC CHW on 11/29/2023.  Patient is actively working to accomplish goal(s). Patient's goal(s) appropriate and relevant at this time. Patient is not due for updated Plan of Care.  Assessments will be completed annually or as needed/with change of patient status.      Care Plan: General       Problem: HP GENERAL PROBLEM       Goal: General Goal - I would like to discuss and completed preventive/annual wellness visit, Hepatitis B and COVID-19 vaccine #4 before end of December 2023.       Start Date: 10/18/2023    This Visit's Progress: 30% Recent Progress: 10%    Note:     Measure of Success: NA  Barriers: Language  Strengths: Spouse/family member assisted to appt as schedule, and connect with PCP office to schedule additional follow up appt as need.   Patient expressed understanding of goal: yes    Action steps to achieve this goal:  1. I will attend the preventive visit appt on 11/30/2023 at 9:40AM with Dr. Mancia in [facility]. (Completed. Updated- appt rescheduled to 12/14/2023 due to daughter appt conflict per pt on 11/29/2023).   2. I will discuss due care gaps details and seeking advised with Dr. Mancia on 11/30/2023. (Completed. Updated- appt rescheduled to 12/14/2023 due to daughter appt conflict per pt on 11/29/2023).   3. I will updates status with CCC team at next outreach follow up. (Updated: 11/29/2023)  4. I will attend appt rescheduled on 12/14/2023 with Dr. Mancia in [facility]. (Updated: 11/29/2023)                                 Plan/Recommendations: The patient will continue working with Care Coordination to achieve goal(s) as  above. CHW will continue outreaches at minimum every 30 days and will involve Lead CC as needed or if patient is ready to move to Maintenance. Lead CC will continue to monitor CHW outreaches and patient's progress to goal(s) every 6 weeks.     Plan of Care updated and sent to patient: MOSES Snider   Social Work Care Coordinator   [facility]  [phone]

## 2024-01-23 NOTE — PROGRESS NOTES
Clinic Care Coordination Contact  [facility]/Voicemail    Reason: CCC CHW Follow Up Called (follow up current goal's)    Clinical Data: Care Coordinator Outreach:    Outreach Documentation Number of Outreach Attempt   1/23/2024   2:32 PM 1     Attempted #1: Left message on patient's voicemail with call back information and requested return call.    Plan: Care Coordinator will try to reach patient again in 2-3 weeks.

## 2024-02-07 ENCOUNTER — PATIENT OUTREACH (OUTPATIENT)
Dept: CARE COORDINATION | Facility: CLINIC | Age: 39
End: 2024-02-07
Payer: COMMERCIAL

## 2024-02-07 NOTE — PROGRESS NOTES
Clinic Care Coordination Contact:  Community Health Worker Follow Up    Reason: CCC CHW Follow Up Called (follow up current goal's)     Care Gaps:   Health Maintenance Due   Topic Date Due    GLUCOSE  Never done    PHQ-2 (once per calendar year)  01/01/2024    HPV TEST  08/06/2024    PAP  08/06/2024     Pt is aware of due care gaps. Suggested pt to schedule follow up appt with PCP/PCP provider(s) team in [facility] to discuss due care gaps. Pt confirmed and will connect with [facility] scheduling line per pt.    Care Plan:   Care Plan: General Completed 9/19/2023      Problem: HP GENERAL PROBLEM  Resolved 9/19/2023      Goal: General Goal - I would like to get my flu shot/vaccine by the next 1 month.  Completed 9/18/2023      Start Date: 9/26/2022    This Visit's Progress: 100% Recent Progress: 50%    Note:     General Goal - I would like to get my flu shot/vaccine by the next 1 month.     Measure of Success:   Barriers: language  Strengths: Work with [facility] Scheduling patient registration team regarding to schedule appt.   Patient expressed understanding of goal: yes    Action steps to achieve this goal:  1. I will wait to hear from [facility] Patient Scheduling/Registration team to connect with me to schedule an appt with [facility] nurse. (Completed)  2. My  Tiffany and I will attend our flu shot appt on 11/18/2022 at 10:10AM and 10:20AM with [facility]. (Completed)   3. I will follow instructions and will connect [facility] with any questions. (Completed)    Note/comment:   -9/26/2022: Message route to [facility] Scheduling/pt registration pool to connect with patient per pt request. (CHW Sandra).    Personal action steps:   I completed flu shot on 11/18/2022 with [facility] nurse. Met goal.   I will connect with PCP office in the future with any questions.                            Problem: HP GENERAL PROBLEM  Resolved 9/19/2023              Problem: HP GENERAL PROBLEM  Resolved 9/19/2023      Goal: General Goal -   I need help with  connected to my health care insurance company to request copy of my family medical insurance cards send to me via mail by the next 1 month.  Completed 4/13/2023      Start Date: 3/10/2023    This Visit's Progress: 100% Recent Progress: On track    Note:     Goal: I need help with connected to my health care insurance company to request copy of my family medical insurance cards send to me via mail by the next 1 month.     Measure of Success: n/a  Barriers: Language  Strengths: Working with my CHW and [facility] team.  Patient expressed understanding of goal: yes    Action steps to achieve this Goal:  1. I will wait to hear from my CHW next week, 3/13/2023. (Completed; 3/13/2023-CHW MX)  2. I will wait to receive me and my two child (Jolie and Andrew) medical insurance cards from [employer] via mail by the next 7-10 business days. (Completed; 4/13/2023)  3. I will updates status with my CHW at next outreach follow up. (Completed; 4/13/2023)    Note/comment:   -3/13/2023: CHW with pt conference called to [employer] at [phone] and requested pt and two child medical insurance cards send to pt via mail per pt request. Patient [employer] ID#: [id number].    Personal action steps:   I had received all the medical insurance card from [employer]. Met goal. Thank you CCC.  My  and I will connect with [employer] in the future with any questions.                               Care Plan: General Completed 10/20/2023      Problem: HP GENERAL PROBLEM  Resolved 10/20/2023      Goal: General Goal - I need help with resolved medical bill statement by the next 3-5 months.  Completed 10/18/2023      Start Date: 5/30/2023    This Visit's Progress: 100% Recent Progress: 50%    Note:     Goal: I need help with resolved medical bill statement by the next 3-5 months.  Barriers: Language barrier  Strengths: Seeking support  Patient expressed understanding of goal: Yes    Action steps to achieve this goal:  1. I will  wait to hear from [facility] for the next step regards to billing statement dropped it off to CCC team on week of 5/30/2023. (Completed)- Spoke with billing office; bill forward to medical insurance. No longer received same medical bill per pt on 9/18/2023.   2. I will connect with [facility] team monthly to assess and address goal. If other concerns arise, my mom will discuss them with [facility] team.  (Completed per pt confirmed on 10/18/2023)  3. I will review incoming mails see if the same medical bill statement send again and updates status with CHW at next outreach follow up. (Completed per pt confirmed on 10/18/2023)    Personal action steps:   I am no longer received the same medical bill statement for months. Believed it must be paid/cover by my health care insurance company. Met goal.  I will have spouse assist with connect to health care insurance company in the future if any questions. Thank you.                        Problem: HP GENERAL PROBLEM  Resolved 10/20/2023                  Care Plan: General-preventive/physical visit 12/14/2023 Completed 2/8/2024      Problem: HP GENERAL PROBLEM  Resolved 2/8/2024      Goal: General Goal - I would like to discuss and completed preventive/annual wellness visit, Hepatitis B and COVID-19 vaccine #4 before end of December 2023.  Completed 2/7/2024      Start Date: 10/18/2023    This Visit's Progress: 100% Recent Progress: 30%    Note:     Measure of Success: NA  Barriers: Language  Strengths: Spouse/family member assisted to appt as schedule, and connect with PCP office to schedule additional follow up appt as need.   Patient expressed understanding of goal: yes    Action steps to achieve this goal:  1. I will attend the preventive visit appt on 11/30/2023 at 9:40AM with Dr. Mancia in [facility]. (Completed. Updated- appt rescheduled to 12/14/2023 due to daughter appt conflict per pt on 11/29/2023).   2. I will discuss due care gaps details and seeking advised with Dr. Mancia on  11/30/2023. (Completed. Updated- appt rescheduled to 12/14/2023 due to daughter appt conflict per pt on 11/29/2023).   3. I will updates status with CCC team at next outreach follow up. (Completed)  4. I will attend appt rescheduled on 12/14/2023 with Dr. Mancia in [facility]. (Completed)    Personal actions steps:   I attended preventive/physical appt on 12/14/2023 with Dr. Mancia in [facility]. Believed I got the hepatitis B and COVID-19 vaccine. (NOTE/FYI: hepatitis B vaccine is no longer found in the pt due care gaps list today, 2/07/2024 per reviewed). Met goal.   I will connect with PCP and Dr. Mancia's office at [phone] in the future with any questions. Thank you.                             Patient Outreach Discussion:   CCC CHW was able to connect with patient today regard to reason(s) above. Check in how patient is doing and any questions or concerns at this time. Patient shared info below. Completed current goal above. Pt isn't available to discuss move to CCC maintenance at this time. Defer to next outreach follow up.     Patient shared:  -Completed physical appt on 12/14/2023 with Dr. Mancia in [facility]. Met goal.   -Doing good.   -Spouse support need.  -No other questions or concerns at this time.     CHW suggested patient for the following:  -Pt connect CCC/CHW with any additional resources need and PCP office for scheduling appt.     Appt reminder:   Pt is aware no future appt schedule with [facility]/PCP office at this time per appt desk reviewed.     CHW Next Follow-up: Verify new goal, and urgent need. If none, discuss move pt toward CCC maintenance. Explain CCC maintenance. Informed patient of due care gaps (if any).     Outreach frequency: monthly      CHW Plan:   -Next CHW outreach plan: 1 month by covering CHW/CCC team.

## 2024-02-22 ENCOUNTER — PATIENT OUTREACH (OUTPATIENT)
Dept: CARE COORDINATION | Facility: CLINIC | Age: 39
End: 2024-02-22
Payer: COMMERCIAL

## 2024-02-22 NOTE — LETTER
[facility]  Patient Centered Plan of Care  About Me:        Patient Name:  Shaila Savage    YOB: 1985  Age:         38 year old   Wiliam MRN:    [id number] Telephone Information:  Home Phone [phone]   Mobile [phone]       Address:  [address] [place] [address] Email address:  [email or web address]      Emergency Contact(s)    Name Relationship Lgl Grd Work Phone Home Phone Mobile Phone   BJ HARLEY Spouse    [phone]           Primary language:  Hmong     needed? Yes   [facility] [place] [facility]:  [phone] op. 1  Other communication barriers:Language barrier    Preferred Method of Communication:     Current living arrangement: I live in a private home with family    Mobility Status/ Medical Equipment: Independent        Health Maintenance  Health Maintenance Reviewed: Due/Overdue     Overdue          JAN 1 2024 PHQ-2 (once per calendar year) (Yearly, January to December)  Last completed: Dec 14, 2023         Due Soon          AUG 6  2024 HPV TEST (Once)  Last completed: Aug 6, 2019     AUG 6  2024 PAP (Every 5 Years)  Last completed: Aug 6, 2019         My Access Plan  Medical Emergency 911   Primary Clinic Line [facility] - [phone]   24 Hour Appointment Line [phone] or  [phone]) (toll-free)   24 Hour Nurse Line [phone] (toll-free)   Preferred Urgent Care [facility], [phone]     Preferred Hospital [facility]  [phone]     Preferred Pharmacy [id number] [address] AT [facility] & [address]     Behavioral Health Crisis Line The National Suicide Prevention Lifeline at 1-698.517.5233 or Text/Call 418           My Care Team Members  Patient Care Team         Relationship Specialty Notifications Start End    Selene Rae MD PCP - General Family Medicine  12/14/23     Phone: [phone] Fax:  [phone]         [address] [facility] [address]    Sandra Fields SABI Community Health Worker Primary Care - CC Admissions 2/26/21     Phone: [phone]         Agatha Henson LSW Lead Care Coordinator  Admissions 9/12/23     Phone: [phone]         Beau Mancia MD Assigned PCP   1/6/24     Phone: [phone] Fax: [phone]         [address] [place] [address]    Alexia Moeller SABI Community Health Worker Primary Care - CC Admissions 2/19/24                 My Care Plans  Self Management and Treatment Plan    Care Plan      Action Plans on File:                       Advance Care Plans/Directives:   Advanced Care Plan/Directives on file:   No    Discussed with patient/caregiver(s): No data recorded           My Medical and Care Information  Problem List   Patient Active Problem List   Diagnosis    Acne vulgaris    Female stress incontinence    Supervision of high risk pregnancy in third trimester    Class 1 obesity due to excess calories without serious comorbidity with body mass index (BMI) of 32.0 to 32.9 in adult    Microcytosis    Alpha thalassemia minor trait    Diet controlled gestational diabetes mellitus (GDM) in third trimester    Normal delivery      Current Medications and Allergies:  See printed Medication Report.    Care Coordination Start Date: 7/13/2022   Frequency of Care Coordination: monthly, more frequently as needed     Form Last Updated: 02/22/2024

## 2024-02-22 NOTE — PROGRESS NOTES
Clinic Care Coordination Contact    CC SARATH sent updated care plan to pt via mail/[employer] as pt was due for an updated one to be sent as it reached 90 days or there was a change in pt condition.    Pt currently has not goals per chart review, FRANSISCO CLEMENS transition pt to maintenance.    MOSES Zavaleta   Social Work Care Coordinator   [facility]    [phone]

## 2024-02-23 ENCOUNTER — APPOINTMENT (OUTPATIENT)
Dept: INTERPRETER SERVICES | Facility: CLINIC | Age: 39
End: 2024-02-23
Payer: COMMERCIAL

## 2024-03-06 ENCOUNTER — OFFICE VISIT (OUTPATIENT)
Dept: FAMILY MEDICINE | Facility: CLINIC | Age: 39
End: 2024-03-06
Payer: COMMERCIAL

## 2024-03-06 ENCOUNTER — TELEPHONE (OUTPATIENT)
Dept: FAMILY MEDICINE | Facility: CLINIC | Age: 39
End: 2024-03-06

## 2024-03-06 ENCOUNTER — PATIENT OUTREACH (OUTPATIENT)
Dept: CARE COORDINATION | Facility: CLINIC | Age: 39
End: 2024-03-06

## 2024-03-06 VITALS
HEIGHT: 58 IN | BODY MASS INDEX: 26.87 KG/M2 | OXYGEN SATURATION: 98 % | HEART RATE: 59 BPM | DIASTOLIC BLOOD PRESSURE: 72 MMHG | SYSTOLIC BLOOD PRESSURE: 112 MMHG | TEMPERATURE: 97.7 F | WEIGHT: 128 LBS

## 2024-03-06 DIAGNOSIS — B37.31 VAGINAL YEAST INFECTION: ICD-10-CM

## 2024-03-06 DIAGNOSIS — Z12.4 CERVICAL CANCER SCREENING: Primary | ICD-10-CM

## 2024-03-06 PROBLEM — Z86.32 HISTORY OF DIET CONTROLLED GESTATIONAL DIABETES MELLITUS (GDM): Status: ACTIVE | Noted: 2021-08-27

## 2024-03-06 PROBLEM — R71.8 MICROCYTOSIS: Status: RESOLVED | Noted: 2021-02-16 | Resolved: 2024-03-06

## 2024-03-06 PROBLEM — E66.3 OVERWEIGHT: Status: ACTIVE | Noted: 2021-02-12

## 2024-03-06 PROBLEM — O09.93 SUPERVISION OF HIGH RISK PREGNANCY IN THIRD TRIMESTER: Status: RESOLVED | Noted: 2020-09-30 | Resolved: 2024-03-06

## 2024-03-06 LAB
CLUE CELLS: ABNORMAL
TRICHOMONAS, WET PREP: ABNORMAL
WBC'S/HIGH POWER FIELD, WET PREP: ABNORMAL
YEAST, WET PREP: PRESENT

## 2024-03-06 PROCEDURE — G0145 SCR C/V CYTO,THINLAYER,RESCR: HCPCS | Performed by: FAMILY MEDICINE

## 2024-03-06 PROCEDURE — 87591 N.GONORRHOEAE DNA AMP PROB: CPT | Performed by: FAMILY MEDICINE

## 2024-03-06 PROCEDURE — 87210 SMEAR WET MOUNT SALINE/INK: CPT | Performed by: FAMILY MEDICINE

## 2024-03-06 PROCEDURE — 87491 CHLMYD TRACH DNA AMP PROBE: CPT | Performed by: FAMILY MEDICINE

## 2024-03-06 PROCEDURE — 87624 HPV HI-RISK TYP POOLED RSLT: CPT | Performed by: FAMILY MEDICINE

## 2024-03-06 PROCEDURE — 99213 OFFICE O/P EST LOW 20 MIN: CPT | Performed by: FAMILY MEDICINE

## 2024-03-06 RX ORDER — FLUCONAZOLE 150 MG/1
TABLET ORAL
COMMUNITY
Start: 2023-12-15

## 2024-03-06 RX ORDER — MICONAZOLE NITRATE 2 %
1 CREAM WITH APPLICATOR VAGINAL AT BEDTIME
Qty: 45 G | Refills: 0 | Status: SHIPPED | OUTPATIENT
Start: 2024-03-06 | End: 2024-03-13

## 2024-03-06 NOTE — TELEPHONE ENCOUNTER
First attempt: left voicemail for patient requesting return call. Please relay message below from Dr. Rae if she calls back.       ----- Message from Selene Rae MD sent at 3/6/2024  2:09 PM CST -----  Team - please call patient with results and send result letter with this information if patient desires for their records.     She does have a yeast infection   I recommend treatment with cream she places in the vagina at night for 7 nights   I sent this to her pharmacy today

## 2024-03-06 NOTE — PROGRESS NOTES
Care Coordination Clinician Chart Review    Situation: Patient chart reviewed by Care Coordinator.       Background: Care Coordination Program started: 7/13/2022. Initial assessment completed and patient-centered care plan(s) were developed with participation from patient. Lead CC handed patient off to CHW for continued outreaches.       Assessment: Per chart review, patient outreach completed by CC CHW on .  Patient is actively working to accomplish goal(s). Patient's goal(s) appropriate and relevant at this time. Patient is not due for updated Plan of Care.  Assessments will be completed annually or as needed/with change of patient status.       No goals as of 3/6/2024. CHW has next outreach 3/7 to assess for any new goals. Pt either move to maintenance or new goal establish and will continue CCC support.      Plan/Recommendations: The patient will continue working with Care Coordination to achieve goal(s) as above. CHW will continue outreaches at minimum every 30 days and will involve Lead CC as needed or if patient is ready to move to Maintenance. Lead CC will continue to monitor CHW outreaches and patient's progress to goal(s) every 6 weeks.     Plan of Care updated and sent to patient: MOSES Snider   Social Work Care Coordinator   [facility]  [phone]

## 2024-03-06 NOTE — RESULT ENCOUNTER NOTE
Team - please call patient with results and send result letter with this information if patient desires for their records.     She does have a yeast infection   I recommend treatment with cream she places in the vagina at night for 7 nights   I sent this to her pharmacy today

## 2024-03-06 NOTE — PROGRESS NOTES
"  Assessment & Plan     Cervical cancer screening  - Pap Screen with HPV - recommended age 30 - 65 years    Vaginal yeast infection  - Wet prep - Clinic Collect  - Chlamydia & Gonorrhea by PCR, GICH/Range - Clinic Collect  - miconazole (MICONAZOLE 7) 2 % cream  Dispense: 45 g; Refill: 0              Subjective   Shaila is a 38 year old, presenting for the following health issues:  vaginal concern         3/6/2024     9:54 AM   Additional Questions   Roomed by M   Accompanied by self     History of Present Illness       Reason for visit:  Vaginal concern    She eats 2-3 servings of fruits and vegetables daily.She consumes 0 sweetened beverage(s) daily.She exercises with enough effort to increase her heart rate 10 to 19 minutes per day.  She exercises with enough effort to increase her heart rate 7 days per week.   She is taking medications regularly.     Pt noting some vaginal itching before her period- wants to check to see that everything is okay    Recent CPE 12/2023 reviewed.  Dx with yeast infection treated with diflucan po x1 pill and repeated again a few days later. Didn't even know she had a yeast infection at that time- didn't really have any symptoms.        Since treatment she has had problems- vaginal discharge and itching/irritation, having to wash her vaginal area sometimes 2x/day for comfort.  Worse just before her period.       likely has other partners?    Mild pelvic discomfort/sharp pain at times but not bad  No urinary sx         Objective    /72 (BP Location: Right arm, Patient Position: Sitting, Cuff Size: Adult Regular)   Pulse 59   Temp 97.7  F (36.5  C) (Temporal)   Ht 1.47 m (4' 9.87\")   Wt 58.1 kg (128 lb)   LMP 02/20/2024   SpO2 98%   BMI 26.87 kg/m    Body mass index is 26.87 kg/m .  Physical Exam   Complete 10 point ROS completed today as part of the exam and patient denies any symptoms as reviewed in HPI     Wt Readings from Last 3 Encounters:   03/06/24 58.1 kg (128 " lb)   12/14/23 58.1 kg (128 lb)   12/08/21 55.3 kg (122 lb)       Patient's last menstrual period was 02/20/2024.    All normal as below except abnormalities include: grossly normal exam   General is a  38 year old sitting comfortably in no apparent distress   Abd:  +BS, soft NT/ND,  No masses or organomegally,   Extremities: Warm, No Edema, 2+ Pedal and radial pulses bilaterally  Skin: No lesions or rashes noted  Neuro: Able to ambulate around the exam room with equal movement, strength and normal coordination of the upper and lower extremeties symmetrically  Pelvic: Normal external genitalia.  Healthy normal vaginal mucosa.  Health appearing cervix.  Testing obtained without pain or difficulty.        History summarized from1-2:CPE 12/2023 reviewed   Old Records-1: Outside allergies, meds, problems and immunizations were reconciled as needed from CareEverywhere  Lab tests reviewed-1: 2023        Selene Rae MD            Signed Electronically by: Selene Rae MD      Prior to immunization administration, verified patients identity using patient s name and date of birth. Please see Immunization Activity for additional information.     Screening Questionnaire for Adult Immunization    Are you sick today?   No   Do you have allergies to medications, food, a vaccine component or latex?   No   Have you ever had a serious reaction after receiving a vaccination?   No   Do you have a long-term health problem with heart, lung, kidney, or metabolic disease (e.g., diabetes), asthma, a blood disorder, no spleen, complement component deficiency, a cochlear implant, or a spinal fluid leak?  Are you on long-term aspirin therapy?   No   Do you have cancer, leukemia, HIV/AIDS, or any other immune system problem?   No   Do you have a parent, brother, or sister with an immune system problem?   No   In the past 3 months, have you taken medications that affect  your immune system, such as prednisone, other steroids, or anticancer  drugs; drugs for the treatment of rheumatoid arthritis, Crohn s disease, or psoriasis; or have you had radiation treatments?   No   Have you had a seizure, or a brain or other nervous system problem?   No   During the past year, have you received a transfusion of blood or blood    products, or been given immune (gamma) globulin or antiviral drug?   No   For women: Are you pregnant or is there a chance you could become       pregnant during the next month?   No   Have you received any vaccinations in the past 4 weeks?   No     Immunization questionnaire answers were all negative.      Patient instructed to remain in clinic for 15 minutes afterwards, and to report any adverse reactions.     Screening performed by EMILEE Russell MA on 3/6/2024 at 10:00 AM.

## 2024-03-07 ENCOUNTER — APPOINTMENT (OUTPATIENT)
Dept: INTERPRETER SERVICES | Facility: CLINIC | Age: 39
End: 2024-03-07
Payer: COMMERCIAL

## 2024-03-07 ENCOUNTER — PATIENT OUTREACH (OUTPATIENT)
Dept: CARE COORDINATION | Facility: CLINIC | Age: 39
End: 2024-03-07
Payer: COMMERCIAL

## 2024-03-07 LAB
C TRACH DNA SPEC QL PROBE+SIG AMP: NEGATIVE
N GONORRHOEA DNA SPEC QL NAA+PROBE: NEGATIVE

## 2024-03-07 NOTE — TELEPHONE ENCOUNTER
Contacted patient and relayed message below from Dr Rae  Patient will [facility] back if not better.    Elen Root RN  [facility]    ----- Message from Selene Rae MD sent at 3/6/2024  2:09 PM CST -----  Team - please call patient with results and send result letter with this information if patient desires for their records.     She does have a yeast infection   I recommend treatment with cream she places in the vagina at night for 7 nights   I sent this to her pharmacy today

## 2024-03-07 NOTE — PROGRESS NOTES
Clinic Care Coordination Contact  Community Health Worker Follow Up    Care Gaps:     Health Maintenance Due   Topic Date Due    HPV TEST  08/06/2024    PAP  08/06/2024         Care Plan:   Care Plan: General Completed 9/19/2023      Problem: HP GENERAL PROBLEM  Resolved 9/19/2023      Goal: General Goal - I would like to get my flu shot/vaccine by the next 1 month.  Completed 9/18/2023      Start Date: 9/26/2022    This Visit's Progress: 100% Recent Progress: 50%    Note:     General Goal - I would like to get my flu shot/vaccine by the next 1 month.     Measure of Success:   Barriers: language  Strengths: Work with [facility] Scheduling patient registration team regarding to schedule appt.   Patient expressed understanding of goal: yes    Action steps to achieve this goal:  1. I will wait to hear from [facility] Patient Scheduling/Registration team to connect with me to schedule an appt with [facility] nurse. (Completed)  2. My  Tiffany and I will attend our flu shot appt on 11/18/2022 at 10:10AM and 10:20AM with [facility]. (Completed)   3. I will follow instructions and will connect [facility] with any questions. (Completed)    Note/comment:   -9/26/2022: Message route to [facility] Scheduling/pt registration pool to connect with patient per pt request. (CHW Sandra).    Personal action steps:   I completed flu shot on 11/18/2022 with [facility] nurse. Met goal.   I will connect with PCP office in the future with any questions.                            Problem: HP GENERAL PROBLEM  Resolved 9/19/2023              Problem: HP GENERAL PROBLEM  Resolved 9/19/2023      Goal: General Goal -   I need help with connected to my health care insurance company to request copy of my family medical insurance cards send to me via mail by the next 1 month.  Completed 4/13/2023      Start Date: 3/10/2023    This Visit's Progress: 100% Recent Progress: On track    Note:     Goal: I need help with connected to my health care insurance company  to request copy of my family medical insurance cards send to me via mail by the next 1 month.     Measure of Success: n/a  Barriers: Language  Strengths: Working with my CHW and CCC team.  Patient expressed understanding of goal: yes    Action steps to achieve this Goal:  1. I will wait to hear from my CHW next week, 3/13/2023. (Completed; 3/13/2023-CHW MX)  2. I will wait to receive me and my two child (Jolie and Andrew) medical insurance cards from [employer] via mail by the next 7-10 business days. (Completed; 4/13/2023)  3. I will updates status with my CHW at next outreach follow up. (Completed; 4/13/2023)    Note/comment:   -3/13/2023: CHW with pt conference called to [employer] at [phone] and requested pt and two child medical insurance cards send to pt via mail per pt request. Patient [employer] ID#: [id number].    Personal action steps:   I had received all the medical insurance card from [employer]. Met goal. Thank you CCC.  My  and I will connect with [employer] in the future with any questions.                               Care Plan: General Completed 10/20/2023      Problem: HP GENERAL PROBLEM  Resolved 10/20/2023      Goal: General Goal - I need help with resolved medical bill statement by the next 3-5 months.  Completed 10/18/2023      Start Date: 5/30/2023    This Visit's Progress: 100% Recent Progress: 50%    Note:     Goal: I need help with resolved medical bill statement by the next 3-5 months.  Barriers: Language barrier  Strengths: Seeking support  Patient expressed understanding of goal: Yes    Action steps to achieve this goal:  1. I will wait to hear from [facility] for the next step regards to billing statement dropped it off to [facility] team on week of 5/30/2023. (Completed)- Spoke with billing office; bill forward to medical insurance. No longer received same medical bill per pt on 9/18/2023.   2. I will connect with [facility] team monthly to assess and  address goal. If other concerns arise, my mom will discuss them with CCC team.  (Completed per pt confirmed on 10/18/2023)  3. I will review incoming mails see if the same medical bill statement send again and updates status with CHW at next outreach follow up. (Completed per pt confirmed on 10/18/2023)    Personal action steps:   I am no longer received the same medical bill statement for months. Believed it must be paid/cover by my health care insurance company. Met goal.  I will have spouse assist with connect to health care insurance company in the future if any questions. Thank you.                        Problem: HP GENERAL PROBLEM  Resolved 10/20/2023                  Care Plan: General-preventive/physical visit 12/14/2023 Completed 2/8/2024      Problem: HP GENERAL PROBLEM  Resolved 2/8/2024      Goal: General Goal - I would like to discuss and completed preventive/annual wellness visit, Hepatitis B and COVID-19 vaccine #4 before end of December 2023.  Completed 2/7/2024      Start Date: 10/18/2023    This Visit's Progress: 100% Recent Progress: 30%    Note:     Measure of Success: NA  Barriers: Language  Strengths: Spouse/family member assisted to appt as schedule, and connect with PCP office to schedule additional follow up appt as need.   Patient expressed understanding of goal: yes    Action steps to achieve this goal:  1. I will attend the preventive visit appt on 11/30/2023 at 9:40AM with Dr. Mancia in [facility]. (Completed. Updated- appt rescheduled to 12/14/2023 due to daughter appt conflict per pt on 11/29/2023).   2. I will discuss due care gaps details and seeking advised with Dr. Mancia on 11/30/2023. (Completed. Updated- appt rescheduled to 12/14/2023 due to daughter appt conflict per pt on 11/29/2023).   3. I will updates status with CCC team at next outreach follow up. (Completed)  4. I will attend appt rescheduled on 12/14/2023 with Dr. Mancia in [facility]. (Completed)    Personal actions steps:   I attended  preventive/physical appt on 12/14/2023 with Dr. Mancia in [place]. Believed I got the hepatitis B and COVID-19 vaccine. (NOTE/FYI: hepatitis B vaccine is no longer found in the pt due care gaps list today, 2/07/2024 per reviewed). Met goal.   I will connect with PCP and Dr. Mancia's office at [phone] in the future with any questions. Thank you.                               Intervention and Education during outreach:   CHW spoke to patient, via fv . Patient states that she is doing good and has no other questions or concerns for CC at this time. Patient has no upcoming appointments.    CHW Plan: CHW will route patient to  CC to review for maintenance.    ELINA aFbian, B.A. [facility] Care Coordination  [facility]:   [facility]  [phone]

## 2024-03-08 LAB
BKR LAB AP GYN ADEQUACY: NORMAL
BKR LAB AP GYN INTERPRETATION: NORMAL
BKR LAB AP HPV REFLEX: NORMAL
BKR LAB AP PREVIOUS ABNORMAL: NORMAL
PATH REPORT.COMMENTS IMP SPEC: NORMAL
PATH REPORT.COMMENTS IMP SPEC: NORMAL
PATH REPORT.RELEVANT HX SPEC: NORMAL

## 2024-03-11 LAB
HUMAN PAPILLOMA VIRUS 16 DNA: NEGATIVE
HUMAN PAPILLOMA VIRUS 18 DNA: NEGATIVE
HUMAN PAPILLOMA VIRUS FINAL DIAGNOSIS: NORMAL
HUMAN PAPILLOMA VIRUS OTHER HR: NEGATIVE

## 2024-03-12 PROBLEM — R87.810 CERVICAL HIGH RISK HPV (HUMAN PAPILLOMAVIRUS) TEST POSITIVE: Status: ACTIVE | Noted: 2024-03-12

## 2024-03-20 ENCOUNTER — PATIENT OUTREACH (OUTPATIENT)
Dept: CARE COORDINATION | Facility: CLINIC | Age: 39
End: 2024-03-20
Payer: COMMERCIAL

## 2024-03-20 NOTE — PROGRESS NOTES
Clinic Care Coordination Contact    Situation: Patient chart reviewed by care coordinator.     Background: FRANSISCO CLEMENS to determine if maintenance is approved for pt after pt completed goals     Assessment: FRANSISCO CLEMENS reviewed chart and determined appropriate to move to maintenance as pt's goals are completed     Plan/Recommendations: Outreaches moved to every 2 month    MOSES Zavaleta   Social Work Care Coordinator   [facility]  [phone]

## 2024-04-04 NOTE — PROGRESS NOTES
SUBJECTIVE:  Shaila Savage,  is here for a postpartum visit.  She had a  on 21 delivering a healthy baby at term.      HPI:  She has no concerns. She feels well. Feels like her energy is back to normal. No abnormal bleeding or discharge. Is bottle feeding.     Delivery complications:  No  Breast feeding:  No  Bladder problems:  No  Bowel problems/hemorrhoids:  No  Episiotomy/laceration/incision healed? No  Vaginal flow:  None  [place]:  No  Contraception: none  Emotional adjustment:  doing well and happy  Back to work:  no    12 point review of systems negative other than symptoms noted below or in the HPI.    OBJECTIVE:  Vitals: /73 (BP Location: Left arm, Patient Position: Sitting, Cuff Size: Adult Regular)   Pulse 69   Resp 16   Wt 55.3 kg (122 lb)   LMP 2020 (Exact Date)   BMI 25.50 kg/m    BMI= Body mass index is 25.5 kg/m .  General - pleasant female in no acute distress.  Breast -  deferred  Abdomen - No incision  Pelvic - EG: normal adult female, BUS: within normal limits, Vagina: well rugated, no discharge, Cervix: no lesions or CMT, Uterus: firm, normal sized and nontender, midplane in position. Adnexae: no masses or tenderness.  Rectovaginal - deferred.    ASSESSMENT:    ICD-10-CM    1. Diet controlled gestational diabetes mellitus (GDM) in third trimester  O24.410 **A1C FUTURE 3mo     Hemoglobin   2. Routine postpartum follow-up  Z39.2        PLAN:  EHR reviewed.   May resume normal activities without restrictions.  Pap smear was not done today it is up to date.   Blood work collected due to GDM.   She refuses birth control. Will call if she changes her mind. Will continue PNV.     Full counseling was provided, and all questions were answered.   Return to clinic in one year for an annual visit.     Beau Mancia MD      
97.9

## 2024-05-06 ENCOUNTER — PATIENT OUTREACH (OUTPATIENT)
Dept: CARE COORDINATION | Facility: CLINIC | Age: 39
End: 2024-05-06
Payer: COMMERCIAL

## 2024-05-06 NOTE — PROGRESS NOTES
Clinic Care Coordination Contact    Assessment: Care Coordinator contacted patient for 2 month follow up.  Patient has continued to follow the plan of care and assessment is negative for any new needs or concerns.    Enrollment status: Graduated.      Plan: No further outreaches at this time.  Patient will continue to follow the plan of care.  If new needs arise a new Care Coordination referral may be placed.  FYI to PCP    MOSES Zavaleta   Social Work Care Coordinator   [facility]  [phone]

## 2024-05-06 NOTE — Clinical Note
FYI- pt graduated from [facility] as goals were completed and no ongoing needs were identified by pt.

## 2024-05-06 NOTE — PROGRESS NOTES
Clinic Care Coordination Contact:  Community Health Worker Follow Up    Reason: [facility] CHW Maintenance Outreach Called    Care Gaps:   There are no preventive care reminders to display for this patient.  Currently there are no Care Gaps.    Care Plan:   Care Plan: General Completed 9/19/2023      Problem: HP GENERAL PROBLEM  Resolved 9/19/2023      Goal: General Goal - I would like to get my flu shot/vaccine by the next 1 month.  Completed 9/18/2023      Start Date: 9/26/2022    This Visit's Progress: 100% Recent Progress: 50%    Note:     General Goal - I would like to get my flu shot/vaccine by the next 1 month.     Measure of Success:   Barriers: language  Strengths: Work with [facility] Scheduling patient registration team regarding to schedule appt.   Patient expressed understanding of goal: yes    Action steps to achieve this goal:  1. I will wait to hear from [facility] Patient Scheduling/Registration team to connect with me to schedule an appt with [facility] nurse. (Completed)  2. My  Tiffany and I will attend our flu shot appt on 11/18/2022 at 10:10AM and 10:20AM with [facility]. (Completed)   3. I will follow instructions and will connect [facility] with any questions. (Completed)    Note/comment:   -9/26/2022: Message route to [facility] Scheduling/pt registration pool to connect with patient per pt request. (CHW Sandra).    Personal action steps:   I completed flu shot on 11/18/2022 with [facility] nurse. Met goal.   I will connect with PCP office in the future with any questions.                            Problem: HP GENERAL PROBLEM  Resolved 9/19/2023              Problem: HP GENERAL PROBLEM  Resolved 9/19/2023      Goal: General Goal -   I need help with connected to my health care insurance company to request copy of my family medical insurance cards send to me via mail by the next 1 month.  Completed 4/13/2023      Start Date: 3/10/2023    This Visit's Progress: 100% Recent Progress: On track    Note:     Goal: I  need help with connected to my health care insurance company to request copy of my family medical insurance cards send to me via mail by the next 1 month.     Measure of Success: n/a  Barriers: Language  Strengths: Working with my CHW and CCC team.  Patient expressed understanding of goal: yes    Action steps to achieve this Goal:  1. I will wait to hear from my CHW next week, 3/13/2023. (Completed; 3/13/2023-CHW MX)  2. I will wait to receive me and my two child (Jolie and Andrew) medical insurance cards from [employer] via mail by the next 7-10 business days. (Completed; 4/13/2023)  3. I will updates status with my CHW at next outreach follow up. (Completed; 4/13/2023)    Note/comment:   -3/13/2023: CHW with pt conference called to [employer] at [phone] and requested pt and two child medical insurance cards send to pt via mail per pt request. Patient [employer] ID#: [id number].    Personal action steps:   I had received all the medical insurance card from [employer]. Met goal. Thank you CCC.  My  and I will connect with [employer] in the future with any questions.                               Care Plan: General Completed 10/20/2023      Problem: HP GENERAL PROBLEM  Resolved 10/20/2023      Goal: General Goal - I need help with resolved medical bill statement by the next 3-5 months.  Completed 10/18/2023      Start Date: 5/30/2023    This Visit's Progress: 100% Recent Progress: 50%    Note:     Goal: I need help with resolved medical bill statement by the next 3-5 months.  Barriers: Language barrier  Strengths: Seeking support  Patient expressed understanding of goal: Yes    Action steps to achieve this goal:  1. I will wait to hear from [facility] for the next step regards to billing statement dropped it off to CCC team on week of 5/30/2023. (Completed)- Spoke with billing office; bill forward to medical insurance. No longer received same medical bill per pt on  9/18/2023.   2. I will connect with CCC team monthly to assess and address goal. If other concerns arise, my mom will discuss them with CCC team.  (Completed per pt confirmed on 10/18/2023)  3. I will review incoming mails see if the same medical bill statement send again and updates status with CHW at next outreach follow up. (Completed per pt confirmed on 10/18/2023)    Personal action steps:   I am no longer received the same medical bill statement for months. Believed it must be paid/cover by my health care insurance company. Met goal.  I will have spouse assist with connect to health care insurance company in the future if any questions. Thank you.                        Problem: HP GENERAL PROBLEM  Resolved 10/20/2023                  Care Plan: General-preventive/physical visit 12/14/2023 Completed 2/8/2024      Problem: HP GENERAL PROBLEM  Resolved 2/8/2024      Goal: General Goal - I would like to discuss and completed preventive/annual wellness visit, Hepatitis B and COVID-19 vaccine #4 before end of December 2023.  Completed 2/7/2024      Start Date: 10/18/2023    This Visit's Progress: 100% Recent Progress: 30%    Note:     Measure of Success: NA  Barriers: Language  Strengths: Spouse/family member assisted to appt as schedule, and connect with PCP office to schedule additional follow up appt as need.   Patient expressed understanding of goal: yes    Action steps to achieve this goal:  1. I will attend the preventive visit appt on 11/30/2023 at 9:40AM with Dr. Mancia in [facility]. (Completed. Updated- appt rescheduled to 12/14/2023 due to daughter appt conflict per pt on 11/29/2023).   2. I will discuss due care gaps details and seeking advised with Dr. Mancia on 11/30/2023. (Completed. Updated- appt rescheduled to 12/14/2023 due to daughter appt conflict per pt on 11/29/2023).   3. I will updates status with CCC team at next outreach follow up. (Completed)  4. I will attend appt rescheduled on 12/14/2023 with   "Gavino in [facility]. (Completed)    Personal actions steps:   I attended preventive/physical appt on 2023 with Dr. Mancia in [facility]. Believed I got the hepatitis B and COVID-19 vaccine. (NOTE/FYI: hepatitis B vaccine is no longer found in the pt due care gaps list today, 2024 per reviewed). Met goal.   I will connect with PCP and Dr. Mancia's office at [phone] in the future with any questions. Thank you.                             Note/comment:   -3/13/2023: CHW with pt conference called to [employer] at [phone] and requested pt and two child medical insurance cards send to pt via mail per pt request. Patient [employer] ID#: [id number].    Patient chart reviewed:   -Cover CHW outreached to patient on 3/07/2024; pt completed all goals per notes reviewed.  -Per CCC care coordinator/SW encounter dated 3/20/2024 notes reviewed;   \"Assessment: CC SW reviewed chart and determined appropriate to move to maintenance as pt's goals are completed.  Plan/Recommendations: Outreaches moved to every 2 month.\"       Patient Outreach Discussion:   CHW was able to connect with patient today regard to reason above through  service. Verified any of new goal, urgent need, medical insurance, health wise concerns, and other questions. Patient confirmed none and doing well. Pt request help request pt and two child medical insurance card.      Coordinate Care:   12:07PM: CHW with patient conference call to [employer] at [phone] and placed request copy of pt and two child (Jolie Pushpa, and Andrew Pushpa) medical insurance card send it to pt via mail per patient request. Spoke with representative Justine. Pt provides both of her child full names, address, and  info. Justine confirmed that pt will received these via mail between 7-10 business days. Pt confirmed. End called with Justine.    CHW explained [facility] graduation and that message route to [facility] lead care coordinator for chart review. Patient is aware if " appropriate, she will no longer receiving follow up call from CCC team. Patient agreed.     Patient is informed to connect with PCP/[facility] office in the future to re-refer to Clinic Care Coordination if need, and Care Teams for additional resources support if needed. CHW thank patient for the pt time. Pt confirmed and thanks [facility].    Appt reminder:   Pt is reminded to future appt date below. Pt is aware of reminder system from [employer]. Encouraged pt to follow up with pcp and care teams as recommendation. Patient confirmed.  Name: Shaila Savage MRN: [id number]     Date: 12/16/2024 Status: Scheduled     Time: 10:00 AM Length: 40     Visit Type: PREVENTATIVE ADULT [2429] Copay: $0.00     Provider: Selene Rae MD Department: [facility]/OB       Notes: physical     CHW Next Follow-up:  Pending. Message route to [facility] lead care coordinator request chart review for this pt.     Outreach frequency: remain CCC maintenance; outreach once in every 2 months per CCC standard work.     CHW Plan:   -Graduation/graduate from Clinic Care Coordination service: message route to [facility] lead care coordinator request chart review and further advised.  -Next CHW outreach plan: 2 months if appropriate by [facility] lead care coordinator.

## 2024-05-06 NOTE — LETTER
[facility] CARE COORDINATION  [address] [facility] [address]    May 6, 2024    Shaila Savage  [address] [place] [address]    Dear Shaila,  Your Care Team congratulates you on your journey to maintain wellness. This document will help guide you on your journey to maintain a healthy lifestyle.  You can use this to help you overcome any barriers you may encounter.  If you should have any questions or concerns, you can contact the members of your Care Team or contact your Primary Care Clinic for assistance.     Health Maintenance  Health Maintenance Reviewed:      My Access Plan  Medical Emergency 911   Primary Clinic Line [facility] - [phone]   24 Hour Appointment Line [phone] or  [phone] ([phone]) (toll-free)   24 Hour Nurse Line [phone] (toll-free)   Preferred Urgent Care     Preferred Hospital     Preferred Pharmacy [id number] [place], [place] - [address] AT [facility] & [address]     Behavioral Health Crisis Line The National Suicide Prevention Lifeline at 1-656.457.4390 or 911     My Care Team Members  Patient Care Team         Relationship Specialty Notifications Start End    Selene Rae MD PCP - General Family Medicine  12/14/23     Phone: [phone] Fax: [phone]         [address] [place] [address]    Sandra Fields, CHW Community Health Worker Primary Care - CC Admissions 2/26/21     Phone: [phone]         Agatha Henson LSW Lead Care Coordinator  Admissions 9/12/23     Phone: [phone]         Beau Mancia MD Assigned PCP   1/6/24     Phone: [phone] Fax: [phone]         [address] [place] [address]                It has been your Clinic Care Team's pleasure to work with you on accomplishing your goals.    Regards,  Your Clinic Care Team

## 2024-08-28 ENCOUNTER — PATIENT OUTREACH (OUTPATIENT)
Dept: CARE COORDINATION | Facility: CLINIC | Age: 39
End: 2024-08-28
Payer: COMMERCIAL

## 2024-08-28 NOTE — PROGRESS NOTES
Clinic Care Coordination Contact:  [facility]/Voicemail    Reason(s):   -[facility] CHW Initial Outreach Called- (offer re-enrollment into Clinic Care Coordination Service)  -Return called    Clinical Data: Care Coordinator Outreach:    Outreach Documentation Number of Outreach Attempt   8/28/2024   2:12 PM 1     Attempted #1:  CHW attempted to connect with patient today, 8/28/2024 regarding to reason's above and no answer. Left a general voice message on patient's voicemail with call back information and requested return call.    Plan:   Attempt #2: Care Coordinator will try to reach patient again in 1-2 business days.

## 2024-08-28 NOTE — PROGRESS NOTES
Clinic Care Coordination Contact:    Reason(s):   -[facility] CHW Initial Outreach Called- (offer re-enrollment into Clinic Care Coordination Service)  Referral provider/name: Sandra ANTOINE  -Return called    Voicemail check:   Patient left a voice message on Sandra ANTOINE voicemail box for a return call. Reason of call: unknown.     Coordinate Care:   CHW/writer opened Care Coordination Program for patient.     Plan:   Writer/CHW returning called, verify reason of call and needs, and offer re-enrollment into Care Coordination Program for patient.

## 2024-08-29 ENCOUNTER — PATIENT OUTREACH (OUTPATIENT)
Dept: CARE COORDINATION | Facility: CLINIC | Age: 39
End: 2024-08-29
Payer: COMMERCIAL

## 2024-08-29 NOTE — PROGRESS NOTES
"Clinic Care Coordination Contact:  Community Health Worker Initial Outreach    Reason(s):   -[facility] CHW Initial Outreach Called- (offer re-enrollment into Clinic Care Coordination Service)  -Return called    Chart reviewed:   -CHW initial outreach attempted #1 completed yesterday, 8/28/2024.     Potential Patient Outreach Discussion:   Attempted #2: CHW call and spoke with patient today, 8/29/2024 to discuss possible clinic care coordination re-enrollment and reason above. Have introduced myself to patient. Clinic care coordination service was described to the patient and immediate needs were discussed. Patient is aware CCC team includes CHW, SW, RN, and FRW. Patient is aware that pt's son, Fabián is currently active with Care Coordination Program.     Patient shared info below. The patient declined enrollment into CCC service at this time. CHW suggested patient to connect with PCP or referral provider office in the future to be re-refer to [facility] service if change mind. Patient confirmed and thank today's call.    Patient shared:   -Needs isn't for me. It's for my son, Fabián Barrow eyes (fyi: both eye per pt) concerns for almost 1 month. Son saw the specialist and med/solution treatment prescribed. It's about 1 month now and hasn't getting any better. Would like the doctor to refer my son to my daughter, Dania eye clinic. Dania have a similar eyes concerns 1 year ago and treatment prescribed and the \"redness\" in the eyes went away. Would like to get the same med treatment.   -Spouse assisted needs.    -Doing good. No other questions or concerns.     Suggestions for patient today:  -Patient connect with the eye clinic for and schedule follow up appt for her son for further advised.   -Patient attend the son [facility] appt on 9/23/2024 with his PCP in [facility] to seek further advised.   -Patient bring her son/child to the [facility] Walk-in care/Urgent care clinics in [facility] for further assessment if symptom " getting worsen or needs.   -Patient connect with 911 emergency line and or emergency dept for any emergency situation.   -Pt connect with son's PCP office with any additional health questions or concerns.     Coordinate Care:   CHW coordinated with [facility] Agatha CLEMENS and CCC RN, Laura regards to pt shared above. Patient's son, Fabián is currently enrolled with Care Coordination Program per son chart reviewed. Notes documented through the pt's sonFabián chart.     Patient accepts CC: No, has no needs for self at this time per patient.     Plan:   -Referral for this patient is closed. No further action need from [facility] service at this time.   -Patient connect with PCP office in the future to be re-refer to Clinic Care Coordination Service if change mind.

## 2024-09-23 ENCOUNTER — ALLIED HEALTH/NURSE VISIT (OUTPATIENT)
Dept: FAMILY MEDICINE | Facility: CLINIC | Age: 39
End: 2024-09-23
Payer: COMMERCIAL

## 2024-09-23 DIAGNOSIS — Z23 ENCOUNTER FOR IMMUNIZATION: Primary | ICD-10-CM

## 2024-09-23 PROCEDURE — 90480 ADMN SARSCOV2 VAC 1/ONLY CMP: CPT

## 2024-09-23 PROCEDURE — 90471 IMMUNIZATION ADMIN: CPT

## 2024-09-23 PROCEDURE — 99207 PR NO CHARGE NURSE ONLY: CPT

## 2024-09-23 PROCEDURE — 91320 SARSCV2 VAC 30MCG TRS-SUC IM: CPT

## 2024-09-23 PROCEDURE — 90656 IIV3 VACC NO PRSV 0.5 ML IM: CPT

## 2024-09-23 NOTE — PROGRESS NOTES
Prior to immunization administration, verified patients identity using patient s name and date of birth. Please see Immunization Activity for additional information.     Is the patient's temperature normal (100.5 or less)? Yes     Patient MEETS CRITERIA. PROCEED with vaccine administration.          9/23/2024   COVID   Have you had myocarditis or pericarditis (inflammation of or around the heart muscle) after getting a COVID-19 vaccine? No   Have you had a serious reaction to a COVID vaccine or something in a COVID vaccine, like polyethylene glycol (PEG) or polysorbate? No   Have you had multisystem inflammatory syndrome from COVID-19 in the past 90 days? No            Patient MEETS CRITERIA. PROCEED with vaccine administration.        9/23/2024   INFLUENZA   Would you like to receive the flu shot or the nasal flu vaccine today? Flu Shot   Have you had a serious reaction to a flu vaccine or something in a flu vaccine? No   Have you had a serious reaction (anaphylaxis) after a previous dose of nasal influenza vaccine or something in the nasal influenza vaccine? No   Have you had Guillain-Marana syndrome within 6 weeks of getting a vaccine? No   Have you received a bone marrow transplant within the previous 6 months? No            Patient MEETS CRITERIA. PROCEED with vaccine administration.        Patient instructed to remain in clinic for 15 minutes afterwards, and to report any adverse reactions.      Link to Ancillary Visit Immunization Standing Orders SmartSet     Screening performed by EMILEE Russell MA on 9/23/2024 at 10:30 AM.

## 2025-01-19 ENCOUNTER — HEALTH MAINTENANCE LETTER (OUTPATIENT)
Age: 40
End: 2025-01-19